# Patient Record
Sex: FEMALE | Race: WHITE | NOT HISPANIC OR LATINO | Employment: FULL TIME | URBAN - METROPOLITAN AREA
[De-identification: names, ages, dates, MRNs, and addresses within clinical notes are randomized per-mention and may not be internally consistent; named-entity substitution may affect disease eponyms.]

---

## 2019-01-31 ENCOUNTER — HOSPITAL ENCOUNTER (OUTPATIENT)
Dept: LAB | Facility: HOSPITAL | Age: 49
Discharge: HOME | End: 2019-01-31

## 2019-01-31 LAB
ALBUMIN SERPL-MCNC: 3.9 G/DL (ref 3.5–5)
ALBUMIN/GLOB SERPL: 1.1 {RATIO} (ref 1.4–2.6)
ALP SERPL-CCNC: 96 U/L (ref 42–98)
ALT SERPL-CCNC: 12 U/L (ref 10–40)
ANION GAP SERPL CALC-SCNC: 16 MMOL/L (ref 8–19)
AST SERPL-CCNC: 12 U/L (ref 15–50)
BASOPHILS # BLD AUTO: 0.06 10*3/UL (ref 0–0.2)
BASOPHILS NFR BLD AUTO: 0.67 % (ref 0–3)
BILIRUB SERPL-MCNC: 0.43 MG/DL (ref 0.2–1.3)
BUN SERPL-MCNC: 13 MG/DL (ref 5–25)
BUN/CREAT SERPL: 16 {RATIO} (ref 6–20)
CALCIUM SERPL-MCNC: 9.4 MG/DL (ref 8.7–10.4)
CHLORIDE SERPL-SCNC: 100 MMOL/L (ref 99–111)
CHOLEST SERPL-MCNC: 192 MG/DL (ref 107–200)
CHOLEST/HDLC SERPL: 3.8 {RATIO} (ref 3–6)
CONV CO2: 26 MMOL/L (ref 22–32)
CONV TOTAL PROTEIN: 7.6 G/DL (ref 6.3–8.2)
CREAT UR-MCNC: 0.79 MG/DL (ref 0.5–0.9)
EOSINOPHIL # BLD AUTO: 0.29 10*3/UL (ref 0–0.7)
EOSINOPHIL # BLD AUTO: 3.43 % (ref 0–7)
ERYTHROCYTE [DISTWIDTH] IN BLOOD BY AUTOMATED COUNT: 16.2 % (ref 11.5–14.5)
GFR SERPLBLD BASED ON 1.73 SQ M-ARVRAT: >60 ML/MIN/{1.73_M2}
GLOBULIN UR ELPH-MCNC: 3.7 G/DL (ref 2–3.5)
GLUCOSE SERPL-MCNC: 355 MG/DL (ref 65–99)
HBA1C MFR BLD: 11.5 G/DL (ref 12–16)
HCT VFR BLD AUTO: 36 % (ref 37–47)
HDLC SERPL-MCNC: 50 MG/DL (ref 40–60)
LDLC SERPL CALC-MCNC: 118 MG/DL (ref 70–100)
LYMPHOCYTES # BLD AUTO: 2.73 10*3/UL (ref 1–5)
MCH RBC QN AUTO: 23.7 PG (ref 27–31)
MCHC RBC AUTO-ENTMCNC: 31.9 G/DL (ref 33–37)
MCV RBC AUTO: 74.3 FL (ref 81–99)
MONOCYTES # BLD AUTO: 0.43 10*3/UL (ref 0.2–1.2)
MONOCYTES NFR BLD AUTO: 5.16 % (ref 3–10)
NEUTROPHILS # BLD AUTO: 4.84 10*3/UL (ref 2–8)
NEUTROPHILS NFR BLD AUTO: 58 % (ref 30–85)
NRBC BLD AUTO-RTO: 0 % (ref 0–0.01)
OSMOLALITY SERPL CALC.SUM OF ELEC: 300 MOSM/KG (ref 273–304)
PLATELET # BLD AUTO: 387 10*3/UL (ref 130–400)
PMV BLD AUTO: 7.5 FL (ref 7.4–10.4)
POTASSIUM SERPL-SCNC: 4.1 MMOL/L (ref 3.5–5.3)
RBC # BLD AUTO: 4.85 10*6/UL (ref 4.2–5.4)
SODIUM SERPL-SCNC: 138 MMOL/L (ref 135–147)
TRIGL SERPL-MCNC: 118 MG/DL (ref 40–150)
TSH SERPL-ACNC: 1.57 M[IU]/L (ref 0.27–4.2)
VARIANT LYMPHS NFR BLD MANUAL: 32.7 % (ref 20–45)
VLDLC SERPL-MCNC: 24 MG/DL (ref 5–37)
WBC # BLD AUTO: 8.35 10*3/UL (ref 4.8–10.8)

## 2019-12-11 ENCOUNTER — HOSPITAL ENCOUNTER (OUTPATIENT)
Dept: OTHER | Facility: HOSPITAL | Age: 49
Discharge: HOME | End: 2019-12-11
Attending: INTERNAL MEDICINE

## 2019-12-11 LAB
AT III ACT/NOR PPP CHRO: 136.4 %{ACTIVITY}
BASOPHILS # BLD AUTO: 0.04 10*3/UL (ref 0–0.2)
BASOPHILS NFR BLD AUTO: 0.5 % (ref 0–3)
CONV ABS IMM GRAN: 0.04 10*3/UL (ref 0–0.2)
CONV IMMATURE GRAN: 0.5 % (ref 0–1.8)
DEPRECATED RDW RBC AUTO: 47 FL (ref 36.4–46.3)
EOSINOPHIL # BLD AUTO: 0.11 10*3/UL (ref 0–0.7)
EOSINOPHIL # BLD AUTO: 1.3 % (ref 0–7)
ERYTHROCYTE [DISTWIDTH] IN BLOOD BY AUTOMATED COUNT: 16.7 % (ref 11.7–14.4)
FERRITIN SERPL-MCNC: 12 NG/ML (ref 10–200)
HCT VFR BLD AUTO: 30.6 % (ref 37–47)
HGB BLD-MCNC: 8.8 G/DL (ref 12–16)
IRON SATN MFR SERPL: 6 % (ref 20–55)
IRON SERPL-MCNC: 29 UG/DL (ref 60–170)
LYMPHOCYTES # BLD AUTO: 1.63 10*3/UL (ref 1–5)
LYMPHOCYTES NFR BLD AUTO: 19.9 % (ref 20–45)
MCH RBC QN AUTO: 22.2 PG (ref 27–31)
MCHC RBC AUTO-ENTMCNC: 28.8 G/DL (ref 33–37)
MCV RBC AUTO: 77.3 FL (ref 81–99)
MONOCYTES # BLD AUTO: 0.45 10*3/UL (ref 0.2–1.2)
MONOCYTES NFR BLD AUTO: 5.5 % (ref 3–10)
NEUTROPHILS # BLD AUTO: 5.91 10*3/UL (ref 2–8)
NEUTROPHILS NFR BLD AUTO: 72.3 % (ref 30–85)
NRBC CBCN: 0 % (ref 0–0.7)
PLATELET # BLD AUTO: 325 10*3/UL (ref 130–400)
PMV BLD AUTO: 9.1 FL (ref 9.4–12.3)
RBC # BLD AUTO: 3.96 10*6/UL (ref 4.2–5.4)
TIBC SERPL-MCNC: 490 UG/DL (ref 245–450)
TRANSFERRIN SERPL-MCNC: 343 MG/DL (ref 250–380)
WBC # BLD AUTO: 8.18 10*3/UL (ref 4.8–10.8)

## 2019-12-13 LAB
PROTEIN C-FUNCTIONAL: 110 % (ref 73–180)
PROTEIN S-FUNCTIONAL: 67 % (ref 63–140)

## 2019-12-14 LAB
C3 SERPL-MCNC: 117 MG/DL (ref 88–201)
C4 SERPL-MCNC: 51 MG/DL (ref 10–40)
CONV ANA IGG BY ELISA: ABNORMAL
RHEUMATOID FACT SERPL-ACNC: <10 IU/ML (ref 0–14)

## 2019-12-17 LAB — F5 GENE MUT ANL BLD/T: NORMAL

## 2019-12-27 ENCOUNTER — HOSPITAL ENCOUNTER (OUTPATIENT)
Dept: OTHER | Facility: HOSPITAL | Age: 49
Discharge: HOME | End: 2019-12-27
Attending: FAMILY MEDICINE

## 2019-12-27 LAB
BASOPHILS # BLD AUTO: 0.06 10*3/UL (ref 0–0.2)
BASOPHILS NFR BLD AUTO: 0.7 % (ref 0–3)
CONV ABS IMM GRAN: 0.01 10*3/UL (ref 0–0.2)
CONV IMMATURE GRAN: 0.1 % (ref 0–1.8)
DEPRECATED RDW RBC AUTO: 54.4 FL (ref 36.4–46.3)
EOSINOPHIL # BLD AUTO: 0.2 10*3/UL (ref 0–0.7)
EOSINOPHIL # BLD AUTO: 2.4 % (ref 0–7)
ERYTHROCYTE [DISTWIDTH] IN BLOOD BY AUTOMATED COUNT: 18.7 % (ref 11.7–14.4)
HCT VFR BLD AUTO: 27.1 % (ref 37–47)
HGB BLD-MCNC: 7.6 G/DL (ref 12–16)
LYMPHOCYTES # BLD AUTO: 2.27 10*3/UL (ref 1–5)
LYMPHOCYTES NFR BLD AUTO: 27.5 % (ref 20–45)
MCH RBC QN AUTO: 22.8 PG (ref 27–31)
MCHC RBC AUTO-ENTMCNC: 28 G/DL (ref 33–37)
MCV RBC AUTO: 81.1 FL (ref 81–99)
MONOCYTES # BLD AUTO: 0.53 10*3/UL (ref 0.2–1.2)
MONOCYTES NFR BLD AUTO: 6.4 % (ref 3–10)
NEUTROPHILS # BLD AUTO: 5.18 10*3/UL (ref 2–8)
NEUTROPHILS NFR BLD AUTO: 62.9 % (ref 30–85)
NRBC CBCN: 0 % (ref 0–0.7)
PLATELET # BLD AUTO: 383 10*3/UL (ref 130–400)
PMV BLD AUTO: 9.3 FL (ref 9.4–12.3)
RBC # BLD AUTO: 3.34 10*6/UL (ref 4.2–5.4)
WBC # BLD AUTO: 8.25 10*3/UL (ref 4.8–10.8)

## 2020-01-09 ENCOUNTER — HOSPITAL ENCOUNTER (OUTPATIENT)
Dept: OTHER | Facility: HOSPITAL | Age: 50
Discharge: HOME | End: 2020-01-09
Attending: OBSTETRICS & GYNECOLOGY

## 2020-01-15 LAB
CONV LAST MENSTURAL PERIOD: NORMAL
SPECIMEN SOURCE: NORMAL
SPECIMEN SOURCE: NORMAL
THIN PREP CVX: NORMAL

## 2020-01-23 ENCOUNTER — HOSPITAL ENCOUNTER (OUTPATIENT)
Dept: LAB | Facility: HOSPITAL | Age: 50
Discharge: HOME | End: 2020-01-23

## 2020-01-23 LAB
ALBUMIN SERPL-MCNC: 4.1 G/DL (ref 3.5–5)
ALBUMIN/GLOB SERPL: 1.3 {RATIO} (ref 1.4–2.6)
ALP SERPL-CCNC: 81 U/L (ref 53–141)
ALT SERPL-CCNC: 11 U/L (ref 10–40)
ANION GAP SERPL CALC-SCNC: 20 MMOL/L (ref 8–19)
AST SERPL-CCNC: 13 U/L (ref 15–50)
BASOPHILS # BLD AUTO: 0.05 10*3/UL (ref 0–0.2)
BASOPHILS NFR BLD AUTO: 0.7 % (ref 0–3)
BILIRUB SERPL-MCNC: 0.31 MG/DL (ref 0.2–1.3)
BUN SERPL-MCNC: 14 MG/DL (ref 5–25)
BUN/CREAT SERPL: 18 {RATIO} (ref 6–20)
BURR CELLS BLD QL SMEAR: SLIGHT
C3 FRG RBC-MCNC: NORMAL
CALCIUM SERPL-MCNC: 9.7 MG/DL (ref 8.7–10.4)
CHLORIDE SERPL-SCNC: 98 MMOL/L (ref 99–111)
CHOLEST SERPL-MCNC: 99 MG/DL (ref 107–200)
CHOLEST/HDLC SERPL: 3.1 {RATIO} (ref 3–6)
CONV ABS IMM GRAN: 0.03 10*3/UL (ref 0–0.2)
CONV ANISOCYTES: SLIGHT
CONV CO2: 22 MMOL/L (ref 22–32)
CONV HYPOCHROMIA IN BLOOD BY LIGHT MICROSCOPY: SLIGHT
CONV IMMATURE GRAN: 0.4 % (ref 0–1.8)
CONV TOTAL PROTEIN: 7.3 G/DL (ref 6.3–8.2)
CREAT UR-MCNC: 0.79 MG/DL (ref 0.5–0.9)
DACRYOCYTES BLD QL SMEAR: SLIGHT
DEPRECATED RDW RBC AUTO: 73 FL (ref 36.4–46.3)
EOSINOPHIL # BLD AUTO: 0.09 10*3/UL (ref 0–0.7)
EOSINOPHIL # BLD AUTO: 1.3 % (ref 0–7)
ERYTHROCYTE [DISTWIDTH] IN BLOOD BY AUTOMATED COUNT: 23.8 % (ref 11.7–14.4)
GFR SERPLBLD BASED ON 1.73 SQ M-ARVRAT: >60 ML/MIN/{1.73_M2}
GLOBULIN UR ELPH-MCNC: 3.2 G/DL (ref 2–3.5)
GLUCOSE SERPL-MCNC: 230 MG/DL (ref 65–99)
HCT VFR BLD AUTO: 35.5 % (ref 37–47)
HDLC SERPL-MCNC: 32 MG/DL (ref 40–60)
HGB BLD-MCNC: 10.8 G/DL (ref 12–16)
LDLC SERPL CALC-MCNC: 47 MG/DL (ref 70–100)
LYMPHOCYTES # BLD AUTO: 1.46 10*3/UL (ref 1–5)
LYMPHOCYTES NFR BLD AUTO: 21.1 % (ref 20–45)
MACROCYTES BLD QL SMEAR: SLIGHT
MCH RBC QN AUTO: 26.3 PG (ref 27–31)
MCHC RBC AUTO-ENTMCNC: 30.4 G/DL (ref 33–37)
MCV RBC AUTO: 86.6 FL (ref 81–99)
MICROCYTES BLD QL: SLIGHT
MONOCYTES # BLD AUTO: 0.46 10*3/UL (ref 0.2–1.2)
MONOCYTES NFR BLD AUTO: 6.6 % (ref 3–10)
NEUTROPHILS # BLD AUTO: 4.83 10*3/UL (ref 2–8)
NEUTROPHILS NFR BLD AUTO: 69.9 % (ref 30–85)
NRBC CBCN: 0 % (ref 0–0.7)
OSMOLALITY SERPL CALC.SUM OF ELEC: 290 MOSM/KG (ref 273–304)
OVALOCYTES BLD QL SMEAR: SLIGHT
PLATELET # BLD AUTO: 367 10*3/UL (ref 130–400)
PMV BLD AUTO: 10.2 FL (ref 9.4–12.3)
POIKILOCYTOSIS BLD QL SMEAR: SLIGHT
POLYCHROMASIA BLD QL SMEAR: NORMAL
POTASSIUM SERPL-SCNC: 4.3 MMOL/L (ref 3.5–5.3)
RBC # BLD AUTO: 4.1 10*6/UL (ref 4.2–5.4)
SODIUM SERPL-SCNC: 136 MMOL/L (ref 135–147)
SPHEROCYTES BLD QL SMEAR: NORMAL
TARGETS BLD QL SMEAR: NORMAL
TRIGL SERPL-MCNC: 99 MG/DL (ref 40–150)
TSH SERPL-ACNC: 0.02 M[IU]/L (ref 0.27–4.2)
VLDLC SERPL-MCNC: 20 MG/DL (ref 5–37)
WBC # BLD AUTO: 6.92 10*3/UL (ref 4.8–10.8)

## 2020-01-24 LAB
EST. AVERAGE GLUCOSE BLD GHB EST-MCNC: 143 MG/DL
HBA1C MFR BLD: 6.6 % (ref 3.5–5.7)

## 2020-02-14 ENCOUNTER — HOSPITAL ENCOUNTER (OUTPATIENT)
Dept: LAB | Facility: HOSPITAL | Age: 50
Discharge: HOME | End: 2020-02-14
Attending: PHYSICIAN ASSISTANT

## 2020-02-14 LAB
CALCIUM SERPL-MCNC: 9.7 MG/DL (ref 8.7–10.4)
PTH-INTACT SERPL-MCNC: 34.1 PG/ML (ref 11.1–79.5)
T4 FREE SERPL-MCNC: 1.5 NG/DL (ref 0.9–1.8)
TSH SERPL-ACNC: 0.09 M[IU]/L (ref 0.27–4.2)

## 2020-02-15 LAB
CONV ANTI MICROSOMAL AB: 10 IU/ML (ref 0–34)
T3FREE SERPL-MCNC: 2.6 PG/ML (ref 2–4.4)

## 2020-02-17 LAB — CONV THYROID STIMULATING IMMUNOGLOBULINS: <0.1 IU/L (ref 0–0.55)

## 2020-03-02 ENCOUNTER — HOSPITAL ENCOUNTER (OUTPATIENT)
Dept: ULTRASOUND IMAGING | Facility: HOSPITAL | Age: 50
Discharge: HOME | End: 2020-03-02
Attending: PHYSICIAN ASSISTANT

## 2020-03-18 ENCOUNTER — HOSPITAL ENCOUNTER (OUTPATIENT)
Dept: NUCLEAR MEDICINE | Facility: HOSPITAL | Age: 50
Discharge: HOME | End: 2020-03-18
Attending: PHYSICIAN ASSISTANT

## 2020-04-20 ENCOUNTER — HOSPITAL ENCOUNTER (OUTPATIENT)
Dept: LAB | Facility: HOSPITAL | Age: 50
Discharge: HOME | End: 2020-04-20
Attending: PHYSICIAN ASSISTANT

## 2020-04-20 LAB
CALCIUM SERPL-MCNC: 9.6 MG/DL (ref 8.7–10.4)
PTH-INTACT SERPL-MCNC: 8.6 PG/ML (ref 11.1–79.5)

## 2020-05-20 ENCOUNTER — HOSPITAL ENCOUNTER (OUTPATIENT)
Dept: LAB | Facility: HOSPITAL | Age: 50
Discharge: HOME | End: 2020-05-20
Attending: OTOLARYNGOLOGY

## 2020-05-20 LAB
CALCIUM SERPL-MCNC: 9.4 MG/DL (ref 8.7–10.4)
PTH-INTACT SERPL-MCNC: 38.3 PG/ML (ref 11.1–79.5)
T4 FREE SERPL-MCNC: 1.3 NG/DL (ref 0.9–1.8)
TSH SERPL-ACNC: 22.45 M[IU]/L (ref 0.27–4.2)

## 2020-06-29 ENCOUNTER — HOSPITAL ENCOUNTER (OUTPATIENT)
Dept: LAB | Facility: HOSPITAL | Age: 50
Discharge: HOME | End: 2020-06-29
Attending: PHYSICIAN ASSISTANT

## 2020-06-29 LAB
T4 FREE SERPL-MCNC: 2 NG/DL (ref 0.9–1.8)
TSH SERPL-ACNC: 0.27 M[IU]/L (ref 0.27–4.2)

## 2020-08-20 ENCOUNTER — HOSPITAL ENCOUNTER (OUTPATIENT)
Dept: LAB | Facility: HOSPITAL | Age: 50
Discharge: HOME | End: 2020-08-20
Attending: PHYSICIAN ASSISTANT

## 2020-08-20 LAB
T4 FREE SERPL-MCNC: 2 NG/DL (ref 0.9–1.8)
TSH SERPL-ACNC: 2.63 M[IU]/L (ref 0.27–4.2)

## 2020-09-24 ENCOUNTER — HOSPITAL ENCOUNTER (OUTPATIENT)
Dept: URGENT CARE | Facility: CLINIC | Age: 50
Discharge: HOME | End: 2020-09-24
Attending: PHYSICIAN ASSISTANT

## 2020-10-30 ENCOUNTER — HOSPITAL ENCOUNTER (OUTPATIENT)
Dept: LAB | Facility: HOSPITAL | Age: 50
Discharge: HOME | End: 2020-10-30
Attending: INTERNAL MEDICINE

## 2020-10-30 LAB
BASOPHILS # BLD AUTO: 0.07 10*3/UL (ref 0–0.2)
BASOPHILS NFR BLD AUTO: 0.9 % (ref 0–3)
CONV ABS IMM GRAN: 0.03 10*3/UL (ref 0–0.2)
CONV IMMATURE GRAN: 0.4 % (ref 0–1.8)
DEPRECATED RDW RBC AUTO: 54.6 FL (ref 36.4–46.3)
EOSINOPHIL # BLD AUTO: 0.27 10*3/UL (ref 0–0.7)
EOSINOPHIL # BLD AUTO: 3.6 % (ref 0–7)
ERYTHROCYTE [DISTWIDTH] IN BLOOD BY AUTOMATED COUNT: 15.7 % (ref 11.7–14.4)
FERRITIN SERPL-MCNC: 40 NG/ML (ref 10–200)
HCT VFR BLD AUTO: 33.6 % (ref 37–47)
HGB BLD-MCNC: 10.6 G/DL (ref 12–16)
IRON SATN MFR SERPL: 15 % (ref 20–55)
IRON SERPL-MCNC: 61 UG/DL (ref 60–170)
LYMPHOCYTES # BLD AUTO: 1.68 10*3/UL (ref 1–5)
LYMPHOCYTES NFR BLD AUTO: 22.5 % (ref 20–45)
MCH RBC QN AUTO: 30 PG (ref 27–31)
MCHC RBC AUTO-ENTMCNC: 31.5 G/DL (ref 33–37)
MCV RBC AUTO: 95.2 FL (ref 81–99)
MONOCYTES # BLD AUTO: 0.41 10*3/UL (ref 0.2–1.2)
MONOCYTES NFR BLD AUTO: 5.5 % (ref 3–10)
NEUTROPHILS # BLD AUTO: 5.01 10*3/UL (ref 2–8)
NEUTROPHILS NFR BLD AUTO: 67.1 % (ref 30–85)
NRBC CBCN: 0 % (ref 0–0.7)
PLATELET # BLD AUTO: 297 10*3/UL (ref 130–400)
PMV BLD AUTO: 10.7 FL (ref 9.4–12.3)
RBC # BLD AUTO: 3.53 10*6/UL (ref 4.2–5.4)
TIBC SERPL-MCNC: 405 UG/DL (ref 245–450)
TRANSFERRIN SERPL-MCNC: 283 MG/DL (ref 250–380)
WBC # BLD AUTO: 7.47 10*3/UL (ref 4.8–10.8)

## 2020-11-30 ENCOUNTER — HOSPITAL ENCOUNTER (OUTPATIENT)
Dept: LAB | Facility: HOSPITAL | Age: 50
Discharge: HOME | End: 2020-11-30
Attending: PHYSICIAN ASSISTANT

## 2020-11-30 LAB
HCT VFR BLD AUTO: 34.9 % (ref 37–47)
HGB BLD-MCNC: 10.9 G/DL (ref 12–16)
T4 FREE SERPL-MCNC: 1.5 NG/DL (ref 0.9–1.8)
TSH SERPL-ACNC: 3.91 M[IU]/L (ref 0.27–4.2)

## 2020-12-01 LAB — T3FREE SERPL-MCNC: 2.2 PG/ML (ref 2–4.4)

## 2020-12-08 ENCOUNTER — HOSPITAL ENCOUNTER (OUTPATIENT)
Dept: LAB | Facility: HOSPITAL | Age: 50
Discharge: HOME | End: 2020-12-08
Attending: PHYSICIAN ASSISTANT

## 2020-12-08 LAB
HCT VFR BLD AUTO: 36.4 % (ref 37–47)
HGB BLD-MCNC: 11.5 G/DL (ref 12–16)

## 2020-12-14 ENCOUNTER — HOSPITAL ENCOUNTER (OUTPATIENT)
Dept: LAB | Facility: HOSPITAL | Age: 50
Discharge: HOME | End: 2020-12-14
Attending: PHYSICIAN ASSISTANT

## 2020-12-14 LAB
HCT VFR BLD AUTO: 39.7 % (ref 37–47)
HGB BLD-MCNC: 12.3 G/DL (ref 12–16)

## 2020-12-17 ENCOUNTER — HOSPITAL ENCOUNTER (OUTPATIENT)
Dept: PREADMISSION TESTING | Facility: HOSPITAL | Age: 50
Discharge: HOME | End: 2020-12-17
Attending: OBSTETRICS & GYNECOLOGY

## 2020-12-18 ENCOUNTER — HOSPITAL ENCOUNTER (OUTPATIENT)
Dept: PREADMISSION TESTING | Facility: HOSPITAL | Age: 50
Discharge: HOME | End: 2020-12-18
Attending: OBSTETRICS & GYNECOLOGY

## 2020-12-18 LAB
HCG UR QL: NEGATIVE
SARS-COV-2 RNA SPEC QL NAA+PROBE: NOT DETECTED

## 2020-12-22 ENCOUNTER — HOSPITAL ENCOUNTER (OUTPATIENT)
Dept: PERIOP | Facility: HOSPITAL | Age: 50
Setting detail: HOSPITAL OUTPATIENT SURGERY
Discharge: HOME | End: 2020-12-23
Attending: OBSTETRICS & GYNECOLOGY

## 2020-12-22 LAB
GLUCOSE BLD-MCNC: 191 MG/DL (ref 65–99)
GLUCOSE BLD-MCNC: 244 MG/DL (ref 65–99)
GLUCOSE BLD-MCNC: 266 MG/DL (ref 65–99)
GLUCOSE BLD-MCNC: 266 MG/DL (ref 65–99)
GLUCOSE BLD-MCNC: 275 MG/DL (ref 65–99)

## 2020-12-23 LAB — GLUCOSE BLD-MCNC: 172 MG/DL (ref 65–99)

## 2021-02-04 ENCOUNTER — HOSPITAL ENCOUNTER (OUTPATIENT)
Dept: VACCINE CLINIC | Facility: HOSPITAL | Age: 51
Discharge: HOME | End: 2021-02-04
Attending: INTERNAL MEDICINE

## 2021-02-24 ENCOUNTER — HOSPITAL ENCOUNTER (OUTPATIENT)
Dept: LAB | Facility: HOSPITAL | Age: 51
Discharge: HOME | End: 2021-02-24

## 2021-02-24 LAB
ALBUMIN SERPL-MCNC: 4.1 G/DL (ref 3.5–5)
ALBUMIN/GLOB SERPL: 1.4 {RATIO} (ref 1.4–2.6)
ALP SERPL-CCNC: 106 U/L (ref 53–141)
ALT SERPL-CCNC: 19 U/L (ref 10–40)
ANION GAP SERPL CALC-SCNC: 12 MMOL/L (ref 8–19)
AST SERPL-CCNC: 21 U/L (ref 15–50)
BASOPHILS # BLD AUTO: 0.05 10*3/UL (ref 0–0.2)
BASOPHILS NFR BLD AUTO: 0.7 % (ref 0–3)
BILIRUB SERPL-MCNC: 0.44 MG/DL (ref 0.2–1.3)
BUN SERPL-MCNC: 18 MG/DL (ref 5–25)
BUN/CREAT SERPL: 19 {RATIO} (ref 6–20)
CALCIUM SERPL-MCNC: 8.8 MG/DL (ref 8.7–10.4)
CHLORIDE SERPL-SCNC: 100 MMOL/L (ref 99–111)
CHOLEST SERPL-MCNC: 213 MG/DL (ref 107–200)
CHOLEST/HDLC SERPL: 2.9 {RATIO} (ref 3–6)
CONV ABS IMM GRAN: 0.03 10*3/UL (ref 0–0.2)
CONV CO2: 26 MMOL/L (ref 22–32)
CONV IMMATURE GRAN: 0.4 % (ref 0–1.8)
CONV TOTAL PROTEIN: 7.1 G/DL (ref 6.3–8.2)
CREAT UR-MCNC: 0.95 MG/DL (ref 0.5–0.9)
DEPRECATED RDW RBC AUTO: 50.8 FL (ref 36.4–46.3)
EOSINOPHIL # BLD AUTO: 0.12 10*3/UL (ref 0–0.7)
EOSINOPHIL # BLD AUTO: 1.7 % (ref 0–7)
ERYTHROCYTE [DISTWIDTH] IN BLOOD BY AUTOMATED COUNT: 14.3 % (ref 11.7–14.4)
EST. AVERAGE GLUCOSE BLD GHB EST-MCNC: 232 MG/DL
GFR SERPLBLD BASED ON 1.73 SQ M-ARVRAT: >60 ML/MIN/{1.73_M2}
GLOBULIN UR ELPH-MCNC: 3 G/DL (ref 2–3.5)
GLUCOSE SERPL-MCNC: 246 MG/DL (ref 65–99)
HBA1C MFR BLD: 9.7 % (ref 3.5–5.7)
HCT VFR BLD AUTO: 43.5 % (ref 37–47)
HDLC SERPL-MCNC: 73 MG/DL (ref 40–60)
HGB BLD-MCNC: 14.2 G/DL (ref 12–16)
LDLC SERPL CALC-MCNC: 116 MG/DL (ref 70–100)
LYMPHOCYTES # BLD AUTO: 1.96 10*3/UL (ref 1–5)
LYMPHOCYTES NFR BLD AUTO: 27.1 % (ref 20–45)
MCH RBC QN AUTO: 31.6 PG (ref 27–31)
MCHC RBC AUTO-ENTMCNC: 32.6 G/DL (ref 33–37)
MCV RBC AUTO: 96.7 FL (ref 81–99)
MONOCYTES # BLD AUTO: 0.58 10*3/UL (ref 0.2–1.2)
MONOCYTES NFR BLD AUTO: 8 % (ref 3–10)
NEUTROPHILS # BLD AUTO: 4.5 10*3/UL (ref 2–8)
NEUTROPHILS NFR BLD AUTO: 62.1 % (ref 30–85)
NRBC CBCN: 0 % (ref 0–0.7)
OSMOLALITY SERPL CALC.SUM OF ELEC: 288 MOSM/KG (ref 273–304)
PLATELET # BLD AUTO: 232 10*3/UL (ref 130–400)
PMV BLD AUTO: 9.7 FL (ref 9.4–12.3)
POTASSIUM SERPL-SCNC: 4.3 MMOL/L (ref 3.5–5.3)
RBC # BLD AUTO: 4.5 10*6/UL (ref 4.2–5.4)
SODIUM SERPL-SCNC: 134 MMOL/L (ref 135–147)
TRIGL SERPL-MCNC: 119 MG/DL (ref 40–150)
TSH SERPL-ACNC: 33.69 M[IU]/L (ref 0.27–4.2)
VLDLC SERPL-MCNC: 24 MG/DL (ref 5–37)
WBC # BLD AUTO: 7.24 10*3/UL (ref 4.8–10.8)

## 2021-03-05 ENCOUNTER — HOSPITAL ENCOUNTER (OUTPATIENT)
Dept: VACCINE CLINIC | Facility: HOSPITAL | Age: 51
Discharge: HOME | End: 2021-03-05
Attending: INTERNAL MEDICINE

## 2021-05-07 ENCOUNTER — HOSPITAL ENCOUNTER (OUTPATIENT)
Dept: DIABETES SERVICES | Facility: HOSPITAL | Age: 51
Discharge: HOME | End: 2021-05-07
Attending: NURSE PRACTITIONER

## 2021-05-07 ENCOUNTER — OFFICE VISIT CONVERTED (OUTPATIENT)
Dept: DIABETES SERVICES | Facility: HOSPITAL | Age: 51
End: 2021-05-07
Attending: NURSE PRACTITIONER

## 2021-05-07 LAB — GLUCOSE BLD-MCNC: 244 MG/DL (ref 65–99)

## 2021-05-28 VITALS
WEIGHT: 237.22 LBS | DIASTOLIC BLOOD PRESSURE: 82 MMHG | SYSTOLIC BLOOD PRESSURE: 154 MMHG | HEART RATE: 98 BPM | OXYGEN SATURATION: 99 % | RESPIRATION RATE: 18 BRPM | TEMPERATURE: 97.7 F | BODY MASS INDEX: 42.03 KG/M2 | HEIGHT: 63 IN

## 2021-05-28 NOTE — PROGRESS NOTES
Patient: WANG HOUSER     Acct: [id number]     Report: #[id number]  UNIT #: [id number]     : 1970    Encounter Date:2021  PRIMARY CARE: Guy Baetty  ***Indu***  --------------------------------------------------------------------------------------------------------------------  Date of Encounter      May 7, 2021            Chief Complaint      DIABETES MELLITUS TYPE II            Allergies      Coded Allergies:             NO KNOWN ALLERGIES (Unverified , 20)            Medications      Last Reconciled on 21 6:58 pm by ROBIN SNYDER      Gabapentin (Gabapentin) 600 Mg Tablet      600 MG PO BID, #60 TAB 0 Refills         Reported         21       rOPINIRole HCl (rOPINIRole HCl) 0.25 Mg Tablet      0.25 MG PO HS for 30 Days, #30 TAB         Reported         20       Apixaban (Eliquis) 5 Mg Tablet      5 MG PO BID         Reported         20       Atorvastatin (Atorvastatin) 10 Mg Tablet      10 MG PO HS         Reported         20       Levothyroxine (Levothyroxine) 0.15 Mg Tablet      0.15 MG PO HS         Reported         20       Insulin Glargine (Lantus SOLOSTAR) 100 Unit/1 Ml Insuln.pen      18 UNITS SUBQ HS, #1 BOX 0 Refills         Reported         19       Insulin Lispro (HumaLOG KWIKPEN 100 UNITS/ML) 100 Units/Ml Insuln.pen      5 UNITS SUBQ TIDPC, #1 BOX 0 Refills         Reported         19            Vital Signs      Height 5 ft 3 in / 160.02 cm      Weight 237 lbs 3.439 oz / 107.6 kg      BSA 2.08 m2      BMI 42.0 kg/m2      Temperature 97.7 F / 36.5 C - Temporal      Pulse 98      Respirations 18      Blood Pressure 154/82 Sitting, Left Arm      Pulse Oximetry 99%, room air      Blood Glucose Value:  244 (Finger Stick)            Eye Exam      When was your last eye exam?:              Where was it done?:        [facility]            Pain Score      Experiencing any pain?:  Yes      Pain Scale Used:  Numerical       Pain Intensity:  7      Pain Comment:        DVT in 2019            Preventative      Hx Influenza Vaccination:  Yes      Date Influenza Vaccine Given:  Oct 1, 2020      Influenza Vaccine Declined:  No      Have You Had 2 or More Falls i:  No      Have You Had a Fall with an In:  No      Hx Pneumococcal Vaccination:  No      Encouraged to follow-up with:  PCP regarding preventative exams.      Chart initiated by:      Yumiko Pugh MA            HPI - Diabetes      This patient is seen in the office today for evaluation for diabetes medication     management and possible insulin pump.  She is a 51-year-old female patient with     a 15-year history of type 2 diabetes.  Her diabetes is complicated by some mild     symptoms of diabetic neuropathy and questionable diabetic retinopathy.  Patient     indicates she was initially treated with metformin alone but had a lot of     gastrointestinal issues so it was stopped approximately 3 months ago.  She was     started on insulin in 2019 and is currently taking 18 units of Lantus each day     as well as Humalog 5 units after meals.  She states the Lantus was increased     from 15 units to 18 units approximately 3 months ago.  She denies ever being     treated with any other medications in the past.            She states she has been trying to watch her diet and struggles to lose weight as    well as control her glucose levels despite her efforts.  She has never had     nutrition counseling specifically for her diabetes.  Unfortunately, the patient     has not been monitoring her glucose levels with any routine pattern.            Most Recent Lab Findings      Most Recent HGA1C      A point-of-care A1c was collected in the office today and was 9.4%.  This     indicates uncontrolled type 2 diabetes.  In comparison her A1c in February this     year was 9.7%.      Diabetes Type:  Type 2      Diabetes Complications:  Neuropathy (some pains at night in the feet),     Retinopathy  "(no retinopathy but does have \"blood behind the eyes\"; going for     injections and laser treatment)      Number of Years?:  15      Hospitalizations 2nd to DM?:  No      ER/911 Secondary to DM:  No      Dietary Habits:  3 Meals daily (sometimes only eats meals; does not count carbs     \"don't know where to even begin with that\")      Exercise:  None      Frequency:  Times per day (maybe once a week; just got a new meter)            PAST,FAMILY,   Past Medical History      Past Medical History:  Thyroid Disease (removed due to thyroid nodules)      Other Medical History      Anemia; DVT right popliteal; bilateral PEs - 2019            Past Surgical History      Past Surgical History:  Cholecystectomy, Hysterectomy (partial), Thyroid Surgery            Past Family History      TYPE 2 DM (Mom and Grandparents; father), Stroke (Dad)            Social History      Marrital Status:        Lives independently:  No      Number of Children:  2      Occupation:  [employer]            Tobacco Use      Smoking status:  Never smoker            Vaping      Currently Vaping:  No            Alcohol Use      Occassional            Substance Use      Substance Use:  Denies Use            Review of Sytems      General:  Appetite Changes (no appetite at all over the last six months or so);     No Fatigue, No Weight Loss; Weight Gain (40lbs. over the last year); No Weakness      Eyes:  No Blurred Vision, No Corrective Lenses; Vision Changes; No Vision Loss      Cardiovascular:  No Chest Pain, No Palpitations, No Peripheral edema      Gastrointestinal:  No Constipation, No Diarrhea, No Nausea/Vomiting      Integumentary:  No Lesions, No Rash, No Wounds      Neurologic:  No Extremity Pain, No Numbness; Tingling (Right Leg from prior     DVT); No Headache, No Dizziness      Psychiatric:  No Anxiety, No Depression, No Stress      Endocrine:  No Hypoglycemia, No Hypo Unawareness, No Nocturnal Hypo, No     Polyuria, No Polyphagia, " No Polydipsia      ENT:  No Hearing loss, No Sinusitis, No Difficulty swallowing      Respiratory:  No Shortness of breath, No Wheezing; Cough      Genitourinary:  No UTI, No Vaginal yeast infections, No Difficulty urinating, No    Incontinence      Musculoskeletal:  No Joint pain, No Muscle pain; Back pain            Exam      Constitutional:  Awake and Alert, Appropriately dressed/groomed; Not Acutely     Distressed      Eyes:  No Scleral Icterus; Intact EOM; No Eyelid swelling      Cardiovascular:  Peripheral Edema (right leg due to past DVT), Normal Chest     Appearance      Musculoskeletal:  Steady Gait, Normal Strength, Normal ROM,       Respiratory:  No Respiratory Distress, No Cyanosis, No Accessory Muscle use      Skin:  No Blisters, No Cuts\Scrapes, No Acanthosis Nigricans, No DM Dermopathy,     No Fungus, No NLD, No Rash, No Vitiligo      Psychiatric:  Appropriate Affect, Intact Judgement, Oriented x 3,       ENMT:  Nose/ears normal, Normal hearing      Extremities:  No Cyanosis, No Edema; Normal temperature; No Deformity            Impression      Type 2 diabetes uncontrolled with mild diabetic neuropathy and questionable     diabetic retinopathy, anemia, chronic DVT of the right popliteal vein, history     of bilateral pulmonary emboli, hypothyroid, obesity class III            Diagnosis      Type 2 diabetes mellitus with hyperglycemia - E11.65            Notes      New Office Procedures      * POINT OF CARE BG, Routine         Dx: Type 2 diabetes mellitus with hyperglycemia - E11.65      * POINT OF CARE HGA1C, Routine         Dx: Type 2 diabetes mellitus with hyperglycemia - E11.65      New Referrals      * DM Self Management Nutrition, Routine         KEO PAREDES with Diabetes Self Managment Ed         Dx: Type 2 diabetes mellitus with hyperglycemia - E11.65            Plan      At this time the patient was instructed to begin monitoring her glucose levels     2-3 times each day and record them  for review at her follow-up appointment.  We     will schedule the patient to see the dietitian for nutrition counseling for     carbohydrate counting, portion control and weight loss.  The patient will be     scheduled for follow-up appointment in this office in 1 month where we will     begin to modify her insulin doses to include carbohydrate coverage and glucose     correction.  In regards to the patient's interest in an insulin pump, we will     delay that request as the patient needs to become accustomed to carbohydrate     counting and adjusting insulin accordingly before going on an insulin pump.            Pain Plan      Pain Zero Today            Referrals      Dietician            Electronically signed by ROBIN SNYDER  05/09/2021 18:58       Disclaimer: Converted document may not contain table formatting or lab diagrams. Please see [employer] [facility] for the authenticated document.

## 2021-06-04 ENCOUNTER — OFFICE VISIT CONVERTED (OUTPATIENT)
Dept: DIABETES SERVICES | Facility: HOSPITAL | Age: 51
End: 2021-06-04
Attending: NURSE PRACTITIONER

## 2021-06-04 ENCOUNTER — HOSPITAL ENCOUNTER (OUTPATIENT)
Dept: DIABETES SERVICES | Facility: HOSPITAL | Age: 51
Discharge: HOME | End: 2021-06-04
Attending: NURSE PRACTITIONER

## 2021-06-04 LAB — GLUCOSE BLD-MCNC: 121 MG/DL (ref 65–99)

## 2021-06-06 VITALS
TEMPERATURE: 97.9 F | BODY MASS INDEX: 41.72 KG/M2 | SYSTOLIC BLOOD PRESSURE: 138 MMHG | DIASTOLIC BLOOD PRESSURE: 72 MMHG | HEART RATE: 84 BPM | HEIGHT: 63 IN | WEIGHT: 235.45 LBS | RESPIRATION RATE: 18 BRPM

## 2021-06-06 NOTE — PROGRESS NOTES
Patient: WANG HOUSER     Acct: [id number]     Report: #[id number]  UNIT #: [id number]     : 1970    Encounter Date:2021  PRIMARY CARE: Guy Beatty  ***Indu***  --------------------------------------------------------------------------------------------------------------------  Date of Encounter      2021            Chief Complaint      DIABETES MELLITUS TYPE II            Allergies      Coded Allergies:             NO KNOWN ALLERGIES (Unverified , 20)            Medications      Last Reconciled on 21 11:55 by ROBIN SNYDER      Lisinopril* (Lisinopril*) 10 Mg Tablet      10 MG PO QDAY, #30 TAB 0 Refills         Reported         21       Insulin Degludec (Tresiba Flextouch U-100) 100 Unit/1 Ml Insuln.pen      20 UNITS SUBQ QDAY for 30 Days, #1 BOX 3 Refills         Prov: ROBIN SNYDER         21       Gabapentin (Gabapentin) 600 Mg Tablet      600 MG PO BID for 30 Days, #60 TAB 1 Refill         Prov: ROBIN SNYDER         21       rOPINIRole HCl (rOPINIRole HCl) 0.25 Mg Tablet      0.25 MG PO HS for 30 Days, #30 TAB         Reported         20       Apixaban (Eliquis) 5 Mg Tablet      5 MG PO BID         Reported         20       Atorvastatin (Atorvastatin) 10 Mg Tablet      10 MG PO HS         Reported         20       Levothyroxine (Levothyroxine) 0.15 Mg Tablet      0.15 MG PO HS         Reported         20       Insulin Lispro (HumaLOG KWIKPEN 100 UNITS/ML) 100 Units/Ml Insuln.pen      5 UNITS SUBQ TIDPC, #1 BOX 0 Refills         Reported         19            Vital Signs      Height 5 ft 3.00 in / 160.02 cm      Weight 235 lbs 7.220 oz / 106.8 kg      BSA 2.07 m2      BMI 41.7 kg/m2      Temperature 97.9 F / 36.61 C - Temporal      Pulse 84      Respirations 18      Blood Pressure 138/72 Sitting, Left Arm      Blood Glucose Value:  121 (Finger Stick)            Eye Exam      When was your last eye exam?:             "  Where was it done?:        [facility]            Pain Score      Experiencing any pain?:  No            Preventative      Hx Influenza Vaccination:  Yes      Date Influenza Vaccine Given:  Oct 1, 2020      Influenza Vaccine Declined:  No      Have You Had 2 or More Falls i:  No      Have You Had a Fall with an In:  No      Hx Pneumococcal Vaccination:  No      Encouraged to follow-up with:  PCP regarding preventative exams.      Chart initiated by:      Yumiko Pugh MA            HPI - Diabetes      This patient is seen in the office today for follow-up evaluation for diabetes     medication management.  She is a 51-year-old female patient with a history of     type 2 diabetes with diabetic neuropathy and diabetic retinopathy.  She is     currently managed using Tresiba insulin 20 units every day and Humalog 5 units 3    times a day with meals which was added at her last appointment.  She was     previously using Lantus insulin but insurance will no longer cover and she was     transitioned to the Tresiba insulin.  She brings glucose logs to the appointment    today that show that most glucose levels are staying in in the 150 or higher     range with several glucose levels in the 200s.  She had one episode of glucose     of 99 and states that she felt really strange and a bit shaky at the time.  It     was explained to the patient this is a relative hypoglycemia and we will bring     her glucose levels down slowly so that she tolerates lower levels better over     time.            Most Recent Lab Findings      Most Recent HGA1C      Most recent A1c was collected on 5/7/2021 was 9.4% indicating uncontrolled type     2 diabetes.      Diabetes Type:  Type 2      Diabetes Complications:  Neuropathy (some pains at night in the feet),     Retinopathy (no retinopathy but does have \"blood behind the eyes\"; going for     injections and laser treatment; she states her vision has improved some and she     will be " "undergoing laser treatment next Thursday)      Number of Years?:  15      Hospitalizations 2nd to DM?:  No      ER/911 Secondary to DM:  No      Dietary Habits:  3 Meals daily (sometimes only eats meals; does not count carbs     \"don't know where to even begin with that\")      Exercise:  None      Frequency:  Times per day (2-3 times each day)      Blood Glucose Range:         with most glucose levels in the 160-250 range            PAST,FAMILY,   Past Medical History      Past Medical History:  Thyroid Disease (removed due to thyroid nodules)      Other Medical History      Anemia; DVT right popliteal; bilateral PEs - 2019            Past Surgical History      Past Surgical History:  Cholecystectomy, Hysterectomy (partial), Thyroid Surgery            Past Family History      TYPE 2 DM (Mom and Grandparents; father), Stroke (Dad)            Social History      Marrital Status:        Lives independently:  No      Number of Children:  2      Occupation:  [employer]            Tobacco Use      Smoking status:  Never smoker            Vaping      Currently Vaping:  No            Alcohol Use      Occassional            Substance Use      Substance Use:  Denies Use            Review of Sytems      General:  No Appetite Changes, No Fatigue, No Weight Loss, No Weight Gain, No     Weakness      Eyes:  No Blurred Vision, No Corrective Lenses, No Vision Changes, No Vision     Loss      Cardiovascular:  No Chest Pain, No Palpitations, No Peripheral edema      Gastrointestinal:  No Constipation, No Diarrhea, No Nausea/Vomiting      Integumentary:  No Lesions, No Rash, No Wounds      Neurologic:  No Extremity Pain, No Numbness, No Tingling, No Headache, No     Dizziness      Psychiatric:  No Anxiety, No Depression; Stress      Endocrine:  No Hypoglycemia, No Hypo Unawareness, No Nocturnal Hypo, No Edwin    yuria, No Polyphagia, No Polydipsia      ENT:  No Hearing loss, No Sinusitis, No Difficulty swallowing    "   Respiratory:  No Shortness of breath, No Wheezing; Cough      Genitourinary:  No UTI, No Vaginal yeast infections, No Difficulty urinating, No    Incontinence      Musculoskeletal:  No Joint pain, No Muscle pain, No Back pain            Exam      Constitutional:  Awake and Alert, Appropriately dressed/groomed; Not Acutely Di    stressed      Eyes:  No Scleral Icterus; Intact EOM; No Eyelid swelling      Cardiovascular:  No Peripheral Edema; Normal Chest Appearance      Musculoskeletal:  Steady Gait, Normal Strength, Normal ROM,       Respiratory:  No Respiratory Distress, No Cyanosis, No Accessory Muscle use      Skin:  No Blisters, No Cuts\Scrapes, No Acanthosis Nigricans, No DM Dermopathy,     No Fungus, No NLD, No Rash, No Vitiligo      Psychiatric:  Appropriate Affect, Intact Judgement, Oriented x 3,       ENMT:  Nose/ears normal, Normal hearing      Extremities:  No Cyanosis, No Edema; Normal temperature; No Deformity            Impression      Type 2 diabetes uncontrolled with diabetic neuropathy and diabetic retinopathy,     anemia, hypothyroid, history of right popliteal DVT and bilateral pulmonary     emboli, obesity class III with BMI of 41.7            Diagnosis      Type 2 diabetes mellitus with hyperglycemia - E11.65            Notes      New Office Procedures      * POINT OF CARE BG, Routine         Dx: Type 2 diabetes mellitus with hyperglycemia - E11.65            Plan      The patient was instructed to increase her Tresiba to 25 units every day.  She     will monitor her fasting glucose levels for 1 week and if they were remain above    140 mg/dL she will increase by 3 units and then repeat the cycle every week     until she is able to achieve fasting glucose levels around 130-140 without     hypoglycemia.  At the patient's last appointment she had been scheduled to see     the dietitian for nutrition counseling but she missed the appointment and has     been rescheduled.  She will be scheduled  for follow-up appointment in this     office in 1 month.  At that time we will begin making adjustments to her     mealtime insulin based on carbohydrate intake.  In the meantime she is     encouraged to monitor her glucose levels 2-3 times each day and record them for     additional review at her follow-up appointment.            Pain Plan      Pain Zero Today            Topics Patient Counseled on      Topics Patient Counseled on:  Meds, Monitoring            Electronically signed by ROBIN SNYDER  06/04/2021 11:55       Disclaimer: Converted document may not contain table formatting or lab diagrams. Please see [employer] [facility] for the authenticated document.

## 2021-06-10 RX ORDER — LISINOPRIL 10 MG/1
10 TABLET ORAL DAILY
Qty: 30 TABLET | Refills: 11 | Status: SHIPPED | OUTPATIENT
Start: 2021-06-10 | End: 2021-07-12 | Stop reason: SDUPTHER

## 2021-06-11 ENCOUNTER — EDUCATION (OUTPATIENT)
Dept: DIABETES SERVICES | Facility: HOSPITAL | Age: 51
End: 2021-06-11

## 2021-06-11 VITALS — HEIGHT: 63 IN | BODY MASS INDEX: 42.29 KG/M2 | WEIGHT: 238.7 LBS

## 2021-06-11 DIAGNOSIS — IMO0002 DIABETES MELLITUS TYPE 2, UNCONTROLLED, WITH COMPLICATIONS: Primary | ICD-10-CM

## 2021-06-11 PROCEDURE — 97802 MEDICAL NUTRITION INDIV IN: CPT | Performed by: DIETITIAN, REGISTERED

## 2021-06-11 NOTE — PROGRESS NOTES
"Carmelina Saldana presents for nutrition consult r/t T2DM.     No past medical history on file.    Social History     Tobacco Use   • Smoking status: Not on file   Substance Use Topics   • Alcohol use: Not on file       Current Outpatient Medications on File Prior to Visit   Medication Sig   • lisinopril (PRINIVIL,ZESTRIL) 10 MG tablet Take 1 tablet by mouth Daily.     No current facility-administered medications on file prior to visit.     Per pt, she reports also taking Tresiba (25 units/day) and Humalog (5 units w/ meals).        06/11/21  0801   Weight: 108 kg (238 lb 11.2 oz)      160 cm (63\")    Body mass index is 42.28 kg/m².       Lab Results   Component Value Date    HGBA1C 9.7 (H) 02/24/2021       Lab Results   Component Value Date    CHLPL 213 (H) 02/24/2021    TRIG 119 02/24/2021    HDL 73 (H) 02/24/2021     (H) 02/24/2021       Potassium   Date Value Ref Range Status   02/24/2021 4.3 3.5 - 5.3 mmol/L Final   02/01/2020 4.1 3.5 - 5.1 mmol/L Final     Creatinine   Date Value Ref Range Status   02/24/2021 0.95 (H) 0.50 - 0.90 mg/dL Final   02/01/2020 0.7 0.7 - 1.5 mg/dL Final     ALT (SGPT)   Date Value Ref Range Status   02/24/2021 19 10 - 40 U/L Final   02/01/2020 17 0 - 35 U/L Final     Comment:     If ALT testing ordered prior to 2359 on 11/16/19, please use reference range: Male 0-50, Female 0-35.  [facility] switched to a new ALT assay on 11/16/19 which yields results 10 U/L lower than the old assay.     AST (SGOT)   Date Value Ref Range Status   02/24/2021 21 15 - 50 U/L Final   02/01/2020 20 15 - 46 U/L Final        Diet History/ Habits: Pt reports consuming 2 meals/day.  Breakfast- rare.  Lunch- chicken salad.  Dinner- grilled chicken, peas, Carmichael sprouts.  Snacks include greek yogurt w/ blueberries, cottage cheese w/ peaches.  Beverages include sugar-free root beer or Dr. Pepper, water.    Physical Activity: Pt states activity level is low outside of " work    Nutrition counseling provided on carbohydrate counting, portion control, measuring and reading labels. Discussed eating out and gave suggestions on controlling carbohydrate intake and making healthier food choices.     Meal Plan:   Total Carbohydrates per meal: 2-3 carb servings/meal, at least 3 meals/day  Lean protein with meals.  Limit added fats.  Snacks: 1 carbohydrate serving (</= 22 g) + 1 protein serving.     Daily exercise encouraged (as recommended by healthcare provider). Discussed the befits of exercise in lowering blood glucose, blood pressure, cholesterol, stress and controlling body weight.     Advised to continue daily blood glucose monitoring to assist with understanding of factors affecting blood glucose and assist with management of diabetes.  Pt reports checking BG frequently during the day.  Fasting 110s, before lunch and dinner 150s, 1 1/2 hours after lunch and dinner low 200s.  Discussed and provided with target BG ranges.     Literature provided: Diabetes Nutrition [employer], Choose [employer] Booklet    Phone number provided and encouraged to call with questions or concerns.     Time spent w/ pt: 30 minutes    Yun Jules RD  06/11/2021

## 2021-06-19 RX ORDER — LEVOTHYROXINE SODIUM 0.2 MG/1
200 TABLET ORAL DAILY
Qty: 30 TABLET | Refills: 5 | Status: SHIPPED | OUTPATIENT
Start: 2021-06-19 | End: 2021-07-12 | Stop reason: SDUPTHER

## 2021-06-24 DIAGNOSIS — E11.65 UNCONTROLLED TYPE 2 DIABETES MELLITUS WITH HYPERGLYCEMIA (HCC): Primary | ICD-10-CM

## 2021-07-11 PROBLEM — E66.813 CLASS 3 SEVERE OBESITY WITHOUT SERIOUS COMORBIDITY WITH BODY MASS INDEX (BMI) OF 40.0 TO 44.9 IN ADULT: Status: ACTIVE | Noted: 2021-07-11

## 2021-07-11 PROBLEM — Z79.01 CHRONIC ANTICOAGULATION: Status: ACTIVE | Noted: 2021-07-11

## 2021-07-11 PROBLEM — Z79.4 TYPE 2 DIABETES MELLITUS WITH DIABETIC NEUROPATHY, WITH LONG-TERM CURRENT USE OF INSULIN (HCC): Status: ACTIVE | Noted: 2021-07-11

## 2021-07-11 PROBLEM — E78.49 OTHER HYPERLIPIDEMIA: Status: ACTIVE | Noted: 2021-07-11

## 2021-07-11 PROBLEM — E89.0 POSTOPERATIVE HYPOTHYROIDISM: Status: ACTIVE | Noted: 2021-07-11

## 2021-07-11 PROBLEM — I10 ESSENTIAL HYPERTENSION: Status: ACTIVE | Noted: 2021-07-11

## 2021-07-11 PROBLEM — IMO0002 UNCONTROLLED TYPE 2 DIABETES MELLITUS: Status: ACTIVE | Noted: 2021-07-11

## 2021-07-11 PROBLEM — E66.01 CLASS 3 SEVERE OBESITY WITHOUT SERIOUS COMORBIDITY WITH BODY MASS INDEX (BMI) OF 40.0 TO 44.9 IN ADULT: Status: ACTIVE | Noted: 2021-07-11

## 2021-07-11 PROBLEM — E11.40 TYPE 2 DIABETES MELLITUS WITH DIABETIC NEUROPATHY, WITH LONG-TERM CURRENT USE OF INSULIN (HCC): Status: ACTIVE | Noted: 2021-07-11

## 2021-07-11 PROBLEM — Z86.711 HISTORY OF PULMONARY EMBOLUS (PE): Status: ACTIVE | Noted: 2021-07-11

## 2021-07-12 ENCOUNTER — OFFICE VISIT (OUTPATIENT)
Dept: INTERNAL MEDICINE | Facility: CLINIC | Age: 51
End: 2021-07-12

## 2021-07-12 VITALS
SYSTOLIC BLOOD PRESSURE: 157 MMHG | HEART RATE: 78 BPM | HEIGHT: 64 IN | OXYGEN SATURATION: 98 % | TEMPERATURE: 97.7 F | DIASTOLIC BLOOD PRESSURE: 87 MMHG | BODY MASS INDEX: 40.12 KG/M2 | WEIGHT: 235 LBS

## 2021-07-12 DIAGNOSIS — Z86.711 HISTORY OF PULMONARY EMBOLUS (PE): ICD-10-CM

## 2021-07-12 DIAGNOSIS — I10 ESSENTIAL HYPERTENSION: ICD-10-CM

## 2021-07-12 DIAGNOSIS — Z12.11 COLON CANCER SCREENING: ICD-10-CM

## 2021-07-12 DIAGNOSIS — E89.0 POSTOPERATIVE HYPOTHYROIDISM: ICD-10-CM

## 2021-07-12 DIAGNOSIS — Z12.31 VISIT FOR SCREENING MAMMOGRAM: ICD-10-CM

## 2021-07-12 DIAGNOSIS — E11.40 TYPE 2 DIABETES MELLITUS WITH DIABETIC NEUROPATHY, WITH LONG-TERM CURRENT USE OF INSULIN (HCC): ICD-10-CM

## 2021-07-12 DIAGNOSIS — E66.01 CLASS 3 SEVERE OBESITY DUE TO EXCESS CALORIES WITHOUT SERIOUS COMORBIDITY WITH BODY MASS INDEX (BMI) OF 40.0 TO 44.9 IN ADULT (HCC): ICD-10-CM

## 2021-07-12 DIAGNOSIS — E78.49 OTHER HYPERLIPIDEMIA: ICD-10-CM

## 2021-07-12 DIAGNOSIS — H43.11 VITREOUS HEMORRHAGE OF RIGHT EYE (HCC): ICD-10-CM

## 2021-07-12 DIAGNOSIS — Z79.4 TYPE 2 DIABETES MELLITUS WITH DIABETIC NEUROPATHY, WITH LONG-TERM CURRENT USE OF INSULIN (HCC): ICD-10-CM

## 2021-07-12 DIAGNOSIS — E11.65 UNCONTROLLED TYPE 2 DIABETES MELLITUS WITH HYPERGLYCEMIA (HCC): ICD-10-CM

## 2021-07-12 DIAGNOSIS — R05.3 CHRONIC COUGH: ICD-10-CM

## 2021-07-12 DIAGNOSIS — Z79.01 CHRONIC ANTICOAGULATION: ICD-10-CM

## 2021-07-12 DIAGNOSIS — Z76.89 ESTABLISHING CARE WITH NEW DOCTOR, ENCOUNTER FOR: Primary | ICD-10-CM

## 2021-07-12 LAB
ALBUMIN SERPL-MCNC: 4 G/DL (ref 3.5–5.2)
ALBUMIN UR-MCNC: <1.2 MG/DL
ALBUMIN/GLOB SERPL: 1.4 G/DL
ALP SERPL-CCNC: 88 U/L (ref 39–117)
ALT SERPL W P-5'-P-CCNC: 20 U/L (ref 1–33)
AMPHET+METHAMPHET UR QL: NEGATIVE
AMPHETAMINE INTERNAL CONTROL: NORMAL
AMPHETAMINES UR QL: NEGATIVE
ANION GAP SERPL CALCULATED.3IONS-SCNC: 8.3 MMOL/L (ref 5–15)
AST SERPL-CCNC: 19 U/L (ref 1–32)
BARBITURATE INTERNAL CONTROL: NORMAL
BARBITURATES UR QL SCN: NEGATIVE
BASOPHILS # BLD AUTO: 0.06 10*3/MM3 (ref 0–0.2)
BASOPHILS NFR BLD AUTO: 0.8 % (ref 0–1.5)
BENZODIAZ UR QL SCN: NEGATIVE
BENZODIAZEPINE INTERNAL CONTROL: NORMAL
BILIRUB SERPL-MCNC: 0.4 MG/DL (ref 0–1.2)
BUN SERPL-MCNC: 13 MG/DL (ref 6–20)
BUN/CREAT SERPL: 13.7 (ref 7–25)
BUPRENORPHINE INTERNAL CONTROL: NORMAL
BUPRENORPHINE SERPL-MCNC: NEGATIVE NG/ML
CALCIUM SPEC-SCNC: 8.9 MG/DL (ref 8.6–10.5)
CANNABINOIDS SERPL QL: NEGATIVE
CHLORIDE SERPL-SCNC: 102 MMOL/L (ref 98–107)
CHOLEST SERPL-MCNC: 164 MG/DL (ref 0–200)
CO2 SERPL-SCNC: 26.7 MMOL/L (ref 22–29)
COCAINE INTERNAL CONTROL: NORMAL
COCAINE UR QL: NEGATIVE
CREAT SERPL-MCNC: 0.95 MG/DL (ref 0.57–1)
DEPRECATED RDW RBC AUTO: 45.1 FL (ref 37–54)
EOSINOPHIL # BLD AUTO: 0.07 10*3/MM3 (ref 0–0.4)
EOSINOPHIL NFR BLD AUTO: 1 % (ref 0.3–6.2)
ERYTHROCYTE [DISTWIDTH] IN BLOOD BY AUTOMATED COUNT: 12.6 % (ref 12.3–15.4)
EXPIRATION DATE: NORMAL
GFR SERPL CREATININE-BSD FRML MDRD: 62 ML/MIN/1.73
GLOBULIN UR ELPH-MCNC: 2.9 GM/DL
GLUCOSE SERPL-MCNC: 210 MG/DL (ref 65–99)
HBA1C MFR BLD: 7.53 % (ref 4.8–5.6)
HCT VFR BLD AUTO: 42.7 % (ref 34–46.6)
HDLC SERPL-MCNC: 49 MG/DL (ref 40–60)
HGB BLD-MCNC: 14.2 G/DL (ref 12–15.9)
IMM GRANULOCYTES # BLD AUTO: 0.03 10*3/MM3 (ref 0–0.05)
IMM GRANULOCYTES NFR BLD AUTO: 0.4 % (ref 0–0.5)
LDLC SERPL CALC-MCNC: 98 MG/DL (ref 0–100)
LDLC/HDLC SERPL: 1.98 {RATIO}
LYMPHOCYTES # BLD AUTO: 1.5 10*3/MM3 (ref 0.7–3.1)
LYMPHOCYTES NFR BLD AUTO: 21.2 % (ref 19.6–45.3)
Lab: NORMAL
MCH RBC QN AUTO: 32.1 PG (ref 26.6–33)
MCHC RBC AUTO-ENTMCNC: 33.3 G/DL (ref 31.5–35.7)
MCV RBC AUTO: 96.4 FL (ref 79–97)
MDMA (ECSTASY) INTERNAL CONTROL: NORMAL
MDMA UR QL SCN: NEGATIVE
METHADONE INTERNAL CONTROL: NORMAL
METHADONE UR QL SCN: NEGATIVE
METHAMPHETAMINE INTERNAL CONTROL: NORMAL
MONOCYTES # BLD AUTO: 0.35 10*3/MM3 (ref 0.1–0.9)
MONOCYTES NFR BLD AUTO: 5 % (ref 5–12)
NEUTROPHILS NFR BLD AUTO: 5.05 10*3/MM3 (ref 1.7–7)
NEUTROPHILS NFR BLD AUTO: 71.6 % (ref 42.7–76)
NRBC BLD AUTO-RTO: 0 /100 WBC (ref 0–0.2)
OPIATES INTERNAL CONTROL: NORMAL
OPIATES UR QL: NEGATIVE
OXYCODONE INTERNAL CONTROL: NORMAL
OXYCODONE UR QL SCN: NEGATIVE
PCP UR QL SCN: NEGATIVE
PHENCYCLIDINE INTERNAL CONTROL: NORMAL
PLATELET # BLD AUTO: 280 10*3/MM3 (ref 140–450)
PMV BLD AUTO: 10.6 FL (ref 6–12)
POTASSIUM SERPL-SCNC: 4.6 MMOL/L (ref 3.5–5.2)
PROT SERPL-MCNC: 6.9 G/DL (ref 6–8.5)
RBC # BLD AUTO: 4.43 10*6/MM3 (ref 3.77–5.28)
SODIUM SERPL-SCNC: 137 MMOL/L (ref 136–145)
THC INTERNAL CONTROL: NORMAL
TRIGL SERPL-MCNC: 90 MG/DL (ref 0–150)
TSH SERPL DL<=0.05 MIU/L-ACNC: 0.14 UIU/ML (ref 0.27–4.2)
VLDLC SERPL-MCNC: 17 MG/DL (ref 5–40)
WBC # BLD AUTO: 7.06 10*3/MM3 (ref 3.4–10.8)

## 2021-07-12 PROCEDURE — 99204 OFFICE O/P NEW MOD 45 MIN: CPT | Performed by: STUDENT IN AN ORGANIZED HEALTH CARE EDUCATION/TRAINING PROGRAM

## 2021-07-12 PROCEDURE — 85025 COMPLETE CBC W/AUTO DIFF WBC: CPT | Performed by: STUDENT IN AN ORGANIZED HEALTH CARE EDUCATION/TRAINING PROGRAM

## 2021-07-12 PROCEDURE — 82043 UR ALBUMIN QUANTITATIVE: CPT | Performed by: STUDENT IN AN ORGANIZED HEALTH CARE EDUCATION/TRAINING PROGRAM

## 2021-07-12 PROCEDURE — 80053 COMPREHEN METABOLIC PANEL: CPT | Performed by: STUDENT IN AN ORGANIZED HEALTH CARE EDUCATION/TRAINING PROGRAM

## 2021-07-12 PROCEDURE — 80061 LIPID PANEL: CPT | Performed by: STUDENT IN AN ORGANIZED HEALTH CARE EDUCATION/TRAINING PROGRAM

## 2021-07-12 PROCEDURE — 84443 ASSAY THYROID STIM HORMONE: CPT | Performed by: STUDENT IN AN ORGANIZED HEALTH CARE EDUCATION/TRAINING PROGRAM

## 2021-07-12 PROCEDURE — 80305 DRUG TEST PRSMV DIR OPT OBS: CPT | Performed by: STUDENT IN AN ORGANIZED HEALTH CARE EDUCATION/TRAINING PROGRAM

## 2021-07-12 PROCEDURE — 83036 HEMOGLOBIN GLYCOSYLATED A1C: CPT | Performed by: STUDENT IN AN ORGANIZED HEALTH CARE EDUCATION/TRAINING PROGRAM

## 2021-07-12 RX ORDER — APIXABAN 5 MG/1
5 TABLET, FILM COATED ORAL 2 TIMES DAILY
Qty: 60 TABLET | Refills: 6 | Status: SHIPPED | OUTPATIENT
Start: 2021-07-12 | End: 2022-09-06 | Stop reason: SDUPTHER

## 2021-07-12 RX ORDER — GABAPENTIN 600 MG/1
600 TABLET ORAL DAILY
Qty: 30 TABLET | Refills: 2 | Status: SHIPPED | OUTPATIENT
Start: 2021-07-12 | End: 2022-10-13

## 2021-07-12 RX ORDER — INSULIN DEGLUDEC INJECTION 100 U/ML
INJECTION, SOLUTION SUBCUTANEOUS
COMMUNITY
Start: 2021-06-01 | End: 2021-07-12 | Stop reason: SDUPTHER

## 2021-07-12 RX ORDER — LEVOTHYROXINE SODIUM 0.2 MG/1
200 TABLET ORAL DAILY
Qty: 30 TABLET | Refills: 5 | Status: SHIPPED | OUTPATIENT
Start: 2021-07-12 | End: 2021-07-13

## 2021-07-12 RX ORDER — LISINOPRIL 10 MG/1
10 TABLET ORAL DAILY
Qty: 30 TABLET | Refills: 11 | Status: SHIPPED | OUTPATIENT
Start: 2021-07-12 | End: 2022-09-06 | Stop reason: SDUPTHER

## 2021-07-12 RX ORDER — APIXABAN 5 MG/1
5 TABLET, FILM COATED ORAL 2 TIMES DAILY
COMMUNITY
Start: 2021-04-22 | End: 2021-07-12 | Stop reason: SDUPTHER

## 2021-07-12 RX ORDER — DULOXETIN HYDROCHLORIDE 60 MG/1
60 CAPSULE, DELAYED RELEASE ORAL DAILY
Qty: 30 CAPSULE | Refills: 3 | Status: SHIPPED | OUTPATIENT
Start: 2021-07-12 | End: 2021-12-01 | Stop reason: SDUPTHER

## 2021-07-12 RX ORDER — ATORVASTATIN CALCIUM 10 MG/1
10 TABLET, FILM COATED ORAL DAILY
COMMUNITY
End: 2021-07-12 | Stop reason: SDUPTHER

## 2021-07-12 RX ORDER — ROPINIROLE 0.25 MG/1
0.25 TABLET, FILM COATED ORAL NIGHTLY
Qty: 30 TABLET | Refills: 11 | Status: SHIPPED | OUTPATIENT
Start: 2021-07-12 | End: 2022-09-06 | Stop reason: SDUPTHER

## 2021-07-12 RX ORDER — INSULIN LISPRO 100 [IU]/ML
5 INJECTION, SOLUTION INTRAVENOUS; SUBCUTANEOUS
Qty: 3 ML | Refills: 11 | Status: SHIPPED | OUTPATIENT
Start: 2021-07-12 | End: 2022-06-08

## 2021-07-12 RX ORDER — INSULIN DEGLUDEC INJECTION 100 U/ML
28 INJECTION, SOLUTION SUBCUTANEOUS DAILY
Qty: 10 ML | Refills: 11 | Status: SHIPPED | OUTPATIENT
Start: 2021-07-12 | End: 2022-09-06 | Stop reason: SDUPTHER

## 2021-07-12 RX ORDER — ATORVASTATIN CALCIUM 10 MG/1
10 TABLET, FILM COATED ORAL DAILY
Qty: 90 TABLET | Refills: 6 | Status: SHIPPED | OUTPATIENT
Start: 2021-07-12 | End: 2022-09-06 | Stop reason: SDUPTHER

## 2021-07-12 RX ORDER — GABAPENTIN 600 MG/1
TABLET ORAL
COMMUNITY
Start: 2021-05-28 | End: 2021-07-12 | Stop reason: SDUPTHER

## 2021-07-12 RX ORDER — INSULIN DEGLUDEC INJECTION 100 U/ML
25 INJECTION, SOLUTION SUBCUTANEOUS DAILY
Qty: 10 ML | Refills: 11 | Status: SHIPPED | OUTPATIENT
Start: 2021-07-12 | End: 2021-07-12

## 2021-07-12 RX ORDER — PANTOPRAZOLE SODIUM 40 MG/1
40 TABLET, DELAYED RELEASE ORAL DAILY
Qty: 90 TABLET | Refills: 2 | Status: SHIPPED | OUTPATIENT
Start: 2021-07-12 | End: 2022-02-10 | Stop reason: SDUPTHER

## 2021-07-12 RX ORDER — ROPINIROLE 0.25 MG/1
TABLET, FILM COATED ORAL
COMMUNITY
Start: 2021-05-04 | End: 2021-07-12 | Stop reason: SDUPTHER

## 2021-07-12 NOTE — PROGRESS NOTES
"Chief Complaint  Establish Care (NEW PATIENT ) and Diabetes    Subjective          Carmelina Saldana presents to [facility]  History of Present Illness    Previous PCP: Dr Sadler (RETIRED)    Specialist(s): Scarlet Landeros (Diabetes), Dr. Wilson in [place] (Eyes)    Immunizations: s/p Moderna COVID x 2.  Has 2020 flu shot.    Colon cancer screenin colonoscopy by Dr. Prather showing polyps (not removed due to chronic anticoagulation)    Mammogram: Never    Pap Smear: Last pap 2021 with Dr. Mckeon ([facility]).  Has had hysterectomy    T2DM:    Taking 28U tresiba a day, and 5U humalog TID with meals.  No correction factor or sliding scale.  Had GI problems with metformin, and now off this medicine.  Taking gabapentin 600 mg once nightly for neuropathic foot pain, and still having pain making it difficult to sleep.  AM blood sugar measurements lately around 130.  Eye exam  at [facility].  Getting eye procedure in near future for vitreous hemorrhage.    Restless Leg:  Well controlled on ropinirole    HTN:    Systolic 130s over 70s at home.  Checks 1-2 times a week.    History of unprovoked DVTs and PE:    On chronic eliquis    Menorrhagia:    S/p hysterectomy and b/l salpingectomy    Hypothyroid:    Hx of thyroid nodules, s/p thyroidectomy.  On synthroid.    Chronic cough:    For about a year.  Has been on lisinopril for several years.  Cough worse when she lies down.      Objective   Vital Signs:   /87 (BP Location: Left arm, Patient Position: Sitting)   Pulse 78   Temp 97.7 °F (36.5 °C) (Temporal)   Ht 162.6 cm (64\")   Wt 107 kg (235 lb)   SpO2 98%   BMI 40.34 kg/m²     Physical Exam  Constitutional:       General: She is not in acute distress.     Appearance: Normal appearance. She is obese. She is not toxic-appearing.   HENT:      Head: Normocephalic and atraumatic.      Right Ear: External ear normal.      Left Ear: " External ear normal.      Nose: Nose normal.   Eyes:      Extraocular Movements: Extraocular movements intact.      Conjunctiva/sclera: Conjunctivae normal.      Pupils: Pupils are equal, round, and reactive to light.        Comments: Vitreous hemorrhage noted right eye   Cardiovascular:      Rate and Rhythm: Normal rate and regular rhythm.      Pulses: Normal pulses.      Heart sounds: Normal heart sounds. No murmur heard.   No friction rub. No gallop.    Pulmonary:      Effort: Pulmonary effort is normal.      Breath sounds: Normal breath sounds. No wheezing.   Abdominal:      General: Abdomen is flat.      Palpations: Abdomen is soft.   Musculoskeletal:         General: No swelling.   Skin:     General: Skin is warm and dry.   Neurological:      General: No focal deficit present.      Mental Status: She is alert. Mental status is at baseline.      Deep Tendon Reflexes: Reflexes normal.   Psychiatric:         Mood and Affect: Mood normal.         Behavior: Behavior normal.         Thought Content: Thought content normal.         Judgment: Judgment normal.            Result Review :   The following data was reviewed by: Guy Beatty MD on 07/12/2021:  Common labs    Common Labsle 12/8/20 12/14/20 2/24/21 2/24/21      [id number]   Glucose   246 (A)    BUN   18    Creatinine   0.95 (A)    Sodium   134 (A)    Potassium   4.3    Chloride   100    Calcium   8.8    Albumin   4.1    Total Bilirubin   0.44    Alkaline Phosphatase   106    AST (SGOT)   21    ALT (SGPT)   19    WBC   7.24    Hemoglobin 11.5 (A) 12.3 14.2    Hematocrit 36.4 (A) 39.7 43.5    Platelets   232    Total Cholesterol   213 (A)    Triglycerides   119    HDL Cholesterol   73 (A)    LDL Cholesterol    116 (A)    Hemoglobin A1C    9.7 (A)   (A) Abnormal value       Comments are available for some flowsheets but are not being displayed.              Procedures      Assessment and Plan    Diagnoses and all orders for this visit:    1. Establishing care  with new doctor, encounter for (Primary)  -     CBC & Differential  -     Comprehensive Metabolic Panel  -     POC Urine Drug Screen [facility]    2. Essential hypertension  -     CBC & Differential  -     Comprehensive Metabolic Panel  -     POC Urine Drug Screen [facility]    3. Class 3 severe obesity due to excess calories without serious comorbidity with body mass index (BMI) of 40.0 to 44.9 in adult (CMS/[facility])  -     POC Urine Drug Screen [facility]    4. Postoperative hypothyroidism  -     TSH  -     POC Urine Drug Screen [facility]    5. Type 2 diabetes mellitus with diabetic neuropathy, with long-term current use of insulin (CMS/[facility])  Assessment & Plan:  -will add cymbalta to gabapentin, as neuropathy inadequately controlled  -checking A1c and microalbumin    Orders:  -     MicroAlbumin, Urine, Random - Urine, Clean Catch  -     CBC & Differential  -     Comprehensive Metabolic Panel  -     Hemoglobin A1c  -     Cancel: Urine Drug Screen - Urine, Clean Catch  -     POC Urine Drug Screen [facility]  -     gabapentin (NEURONTIN) 600 MG tablet; Take 1 tablet by mouth Daily.  Dispense: 30 tablet; Refill: 2    6. Chronic anticoagulation  -     POC Urine Drug Screen [facility]    7. History of pulmonary embolus (PE)  Assessment & Plan:  -unprovoked, on eliquis    Orders:  -     POC Urine Drug Screen [facility]    8. Other hyperlipidemia  -     Lipid Panel  -     POC Urine Drug Screen [facility]    9. Uncontrolled type 2 diabetes mellitus with hyperglycemia (CMS/[facility])  Assessment & Plan:  -A1c 9.7%  -follows with Scarlet Landeros  -will check A1c and microalbumin    Orders:  -     POC Urine Drug Screen [facility]    10. Colon cancer screening  -     Ambulatory Referral For Screening Colonoscopy  -     POC Urine Drug Screen [facility]    11. Visit for screening mammogram  -     Mammo Screening Bilateral With CAD; Future  -     POC Urine Drug Screen [facility]    12.  Chronic cough  Assessment & Plan:  -will empirically treat with PPI  -if no improvement, will substitute ARB for lisinopril    Orders:  -     POC Urine Drug Screen [facility]    13. Vitreous hemorrhage of right eye (CMS/HCC)    Other orders  -     pantoprazole (PROTONIX) 40 MG EC tablet; Take 1 tablet by mouth Daily.  Dispense: 90 tablet; Refill: 2  -     DULoxetine (CYMBALTA) 60 MG capsule; Take 1 capsule by mouth Daily.  Dispense: 30 capsule; Refill: 3  -     atorvastatin (LIPITOR) 10 MG tablet; Take 1 tablet by mouth Daily.  Dispense: 90 tablet; Refill: 6  -     Eliquis 5 MG tablet tablet; Take 1 tablet by mouth 2 (Two) Times a Day.  Dispense: 60 tablet; Refill: 6  -     Insulin Lispro, 1 Unit Dial, (HumaLOG KwikPen) 100 UNIT/ML solution pen-injector; Inject 5 Units under the skin into the appropriate area as directed 3 (Three) Times a Day With Meals.  Dispense: 3 mL; Refill: 11  -     levothyroxine (SYNTHROID, LEVOTHROID) 200 MCG tablet; Take 1 tablet by mouth Daily.  Dispense: 30 tablet; Refill: 5  -     lisinopril (PRINIVIL,ZESTRIL) 10 MG tablet; Take 1 tablet by mouth Daily.  Dispense: 30 tablet; Refill: 11  -     rOPINIRole (REQUIP) 0.25 MG tablet; Take 1 tablet by mouth Every Night.  Dispense: 30 tablet; Refill: 11  -     Tresiba FlexTouch 100 UNIT/ML solution pen-injector injection; Inject 25 Units under the skin into the appropriate area as directed Daily. 28 UNITS DAILY  Dispense: 10 mL; Refill: 11      Follow Up   Return in about 3 months (around 10/12/2021) for diabetes.  Patient was given instructions and counseling regarding her condition or for health maintenance advice. Please see specific information pulled into the AVS if appropriate.

## 2021-07-13 ENCOUNTER — TELEPHONE (OUTPATIENT)
Dept: INTERNAL MEDICINE | Facility: CLINIC | Age: 51
End: 2021-07-13

## 2021-07-13 RX ORDER — LEVOTHYROXINE SODIUM 175 UG/1
175 TABLET ORAL DAILY
Qty: 30 TABLET | Refills: 5 | Status: SHIPPED | OUTPATIENT
Start: 2021-07-13 | End: 2022-02-20 | Stop reason: SDUPTHER

## 2021-07-13 NOTE — PROGRESS NOTES
Your TSH suggests that your thyroid medicine dose is a little high.  We will start 175 micrograms of synthroid.  Prescription sent.    Your lipids are unremarkable.    Your A1c is 7.53%.  This is a big improvement over 9.7%.  Keep up the good work!    Your CBC and CMP were okay (elevated blood sugar noted).  Your urine labs did not show any protein in the urine.

## 2021-07-13 NOTE — ASSESSMENT & PLAN NOTE
-will add cymbalta to gabapentin, as neuropathy inadequately controlled  -checking A1c and microalbumin

## 2021-07-14 ENCOUNTER — TELEPHONE (OUTPATIENT)
Dept: INTERNAL MEDICINE | Facility: CLINIC | Age: 51
End: 2021-07-14

## 2021-08-12 ENCOUNTER — TELEPHONE (OUTPATIENT)
Dept: INTERNAL MEDICINE | Facility: CLINIC | Age: 51
End: 2021-08-12

## 2021-08-12 NOTE — TELEPHONE ENCOUNTER
ATTEMPTED TO CONTACT PT PER PROVIDER'S INSTRUCTIONS     NO ANSWER     LEFT VOICEMAIL WITH INSTRUCTIONS TO RETURN CALL TO OFFICE AT [phone]    OK FOR HUB TO ADVISE/READ     PT IS OVERDUE FOR MAMMOGRAM     PLEASE CALL [facility] ([place])    Phone: [phone]    Hours of Operations: 6:30 a.m. - 5:00 p.m. (Mon-Fri)    ADDRESS  [address] [place] [address]

## 2021-09-30 ENCOUNTER — LAB (OUTPATIENT)
Dept: LAB | Facility: HOSPITAL | Age: 51
End: 2021-09-30

## 2021-09-30 ENCOUNTER — TRANSCRIBE ORDERS (OUTPATIENT)
Dept: LAB | Facility: HOSPITAL | Age: 51
End: 2021-09-30

## 2021-09-30 DIAGNOSIS — Z11.52 ENCOUNTER FOR SCREENING FOR COVID-19: ICD-10-CM

## 2021-09-30 DIAGNOSIS — Z11.52 ENCOUNTER FOR SCREENING FOR COVID-19: Primary | ICD-10-CM

## 2021-09-30 PROCEDURE — C9803 HOPD COVID-19 SPEC COLLECT: HCPCS

## 2021-09-30 PROCEDURE — U0004 COV-19 TEST NON-CDC HGH THRU: HCPCS

## 2021-10-01 LAB — SARS-COV-2 RNA NOSE QL NAA+PROBE: NOT DETECTED

## 2021-10-14 ENCOUNTER — TELEPHONE (OUTPATIENT)
Dept: INTERNAL MEDICINE | Facility: CLINIC | Age: 51
End: 2021-10-14

## 2021-10-14 NOTE — TELEPHONE ENCOUNTER
ATTEMPTED TO CONTACT PT PER PROVIDER'S INSTRUCTIONS     NO ANSWER     LEFT VOICEMAIL WITH INSTRUCTIONS TO RETURN CALL TO OFFICE AT [phone]    OK FOR HUB TO READ/ADVISE  PT HAS OVERDUE LAB RESULTS  Mammo Screening Bilateral With CAD (07/12/2021 14:44)    PT CAN GO TO OUTPATIENT LAB TO COMPLETE    [facility]ST[facility] SERVICES [place]  PHONE [phone]    OPT 2 DIAGNOSTICS/IMAGING/[place] (CT/MRI/XRAY ORDERS)    DIAG[facility]STICS/IMAGING/[place] MENU  [address] [address]    HOURS 730AM - 5PM    OPT 1 APPT SCHEDULING

## 2021-10-18 NOTE — TELEPHONE ENCOUNTER
ATTEMPTED TO CONTACT PT PER PROVIDER'S INSTRUCTIONS      NO ANSWER      LEFT VOICEMAIL WITH INSTRUCTIONS TO RETURN CALL TO OFFICE AT [phone]     OK FOR HUB TO READ/ADVISE  PT HAS OVERDUE LAB RESULTS  Mammo Screening Bilateral With CAD (07/12/2021 14:44)     PT CAN GO TO OUTPATIENT LAB TO COMPLETE     [facility]ST[facility] [place]  PHONE [phone]     OPT 2 DIAGNOSTICS/IMAGING/[place] (CT/MRI/XRAY ORDERS)     DI[facility]STICS/IMAGING/[place] MENU  [address] [address]     HOURS 730AM - 5PM     OPT 1 APPT SCHEDULING

## 2021-10-19 NOTE — TELEPHONE ENCOUNTER
ATTEMPTED TO CONTACT PT PER PROVIDER'S INSTRUCTIONS      NO ANSWER      LEFT VOICEMAIL WITH INSTRUCTIONS TO RETURN CALL TO OFFICE AT [phone]     OK FOR HUB TO READ/ADVISE  PT HAS OVERDUE LAB RESULTS  Mammo Screening Bilateral With CAD (07/12/2021 14:44)     PT CAN GO TO OUTPATIENT LAB TO COMPLETE     [facility]AGNOSTICS/LAB [facility] [place]  PHONE [phone]     OPT 2 DIAGNOSTICS/IMAGING/[place] (CT/MRI/XRAY ORDERS)     DI[facility]NOSTICS/IMAGING/[place] MENU  [address] [address]     HOURS 730AM - 5PM     OPT 1 APPT SCHEDULING

## 2021-12-02 RX ORDER — DULOXETIN HYDROCHLORIDE 60 MG/1
60 CAPSULE, DELAYED RELEASE ORAL DAILY
Qty: 90 CAPSULE | Refills: 3 | Status: SHIPPED | OUTPATIENT
Start: 2021-12-02 | End: 2022-12-30 | Stop reason: SDUPTHER

## 2021-12-28 DIAGNOSIS — E11.40 TYPE 2 DIABETES MELLITUS WITH DIABETIC NEUROPATHY, WITH LONG-TERM CURRENT USE OF INSULIN: ICD-10-CM

## 2021-12-28 DIAGNOSIS — Z79.4 TYPE 2 DIABETES MELLITUS WITH DIABETIC NEUROPATHY, WITH LONG-TERM CURRENT USE OF INSULIN: ICD-10-CM

## 2021-12-29 RX ORDER — GABAPENTIN 600 MG/1
600 TABLET ORAL DAILY
Qty: 30 TABLET | Refills: 2 | OUTPATIENT
Start: 2021-12-29

## 2021-12-29 RX ORDER — GABAPENTIN 600 MG/1
TABLET ORAL
Qty: 60 TABLET | Refills: 0 | OUTPATIENT
Start: 2021-12-29

## 2022-01-24 RX ORDER — GABAPENTIN 600 MG/1
TABLET ORAL
Qty: 60 TABLET | Refills: 0 | OUTPATIENT
Start: 2022-01-24

## 2022-02-10 DIAGNOSIS — Z79.4 TYPE 2 DIABETES MELLITUS WITH DIABETIC NEUROPATHY, WITH LONG-TERM CURRENT USE OF INSULIN: Primary | ICD-10-CM

## 2022-02-10 DIAGNOSIS — E11.40 TYPE 2 DIABETES MELLITUS WITH DIABETIC NEUROPATHY, WITH LONG-TERM CURRENT USE OF INSULIN: Primary | ICD-10-CM

## 2022-02-10 RX ORDER — PANTOPRAZOLE SODIUM 40 MG/1
40 TABLET, DELAYED RELEASE ORAL DAILY
Qty: 90 TABLET | Refills: 2 | Status: SHIPPED | OUTPATIENT
Start: 2022-02-10 | End: 2022-12-30 | Stop reason: SDUPTHER

## 2022-02-10 RX ORDER — GABAPENTIN 600 MG/1
TABLET ORAL
Qty: 60 TABLET | Refills: 0 | OUTPATIENT
Start: 2022-02-10

## 2022-02-20 DIAGNOSIS — E03.8 OTHER SPECIFIED HYPOTHYROIDISM: Primary | ICD-10-CM

## 2022-02-21 RX ORDER — LEVOTHYROXINE SODIUM 175 UG/1
175 TABLET ORAL DAILY
Qty: 30 TABLET | Refills: 5 | Status: SHIPPED | OUTPATIENT
Start: 2022-02-21 | End: 2022-10-14 | Stop reason: SDUPTHER

## 2022-02-21 NOTE — TELEPHONE ENCOUNTER
Thyroid Hormones Protocol Failed 02/20/2022 04:57 PM   Protocol Details  Normal TSH in past 12 months    No active pregnancy on record    No positive pregnancy test in past year    Recent or future visit with authorizing provider

## 2022-06-08 ENCOUNTER — TELEPHONE (OUTPATIENT)
Dept: INTERNAL MEDICINE | Facility: CLINIC | Age: 52
End: 2022-06-08

## 2022-06-08 DIAGNOSIS — Z79.4 TYPE 2 DIABETES MELLITUS WITH DIABETIC NEUROPATHY, WITH LONG-TERM CURRENT USE OF INSULIN: Primary | ICD-10-CM

## 2022-06-08 DIAGNOSIS — E11.40 TYPE 2 DIABETES MELLITUS WITH DIABETIC NEUROPATHY, WITH LONG-TERM CURRENT USE OF INSULIN: Primary | ICD-10-CM

## 2022-06-08 RX ORDER — INSULIN ASPART 100 [IU]/ML
5 INJECTION, SOLUTION INTRAVENOUS; SUBCUTANEOUS
Qty: 15 ML | Refills: 3 | Status: SHIPPED | OUTPATIENT
Start: 2022-06-08 | End: 2022-10-13 | Stop reason: SDUPTHER

## 2022-06-08 NOTE — TELEPHONE ENCOUNTER
INDEXED VIA ONBASE    MED NOT FORMULARY    Insulin Lispro, 1 Unit Dial, (HumaLOG KwikPen) 100 UNIT/ML solution pen-injector (07/12/2021)    ALT LISTED NOVOLOG OR FIASP    PROVIDER PLEASE ADVISE

## 2022-07-21 NOTE — TELEPHONE ENCOUNTER
Insulin Protocol Failed 07/20/2022 09:35 PM   Protocol Details  Lipid profile in past 12 months    Recent or future visit with prescriber (6M/30D)    A1c in past 6 months    Creatinine < 1.3 in past 12 months    No active pregnancy on file    No positive pregnancy test in past 12 months

## 2022-07-22 RX ORDER — INSULIN DEGLUDEC INJECTION 100 U/ML
28 INJECTION, SOLUTION SUBCUTANEOUS DAILY
Qty: 10 ML | Refills: 11 | OUTPATIENT
Start: 2022-07-22

## 2022-07-22 NOTE — TELEPHONE ENCOUNTER
ATTEMPTED TO CONTACT PT PER PROVIDER'S INSTRUCTIONS     NO ANSWER     LEFT VOICEMAIL WITH INSTRUCTIONS TO RETURN CALL TO OFFICE AT [phone]    OK FOR HUB TO ADVISE/READ   Guy Beatty MD Just now (12:37 PM)        Miss Saldana will need an appointment before I will continue her refills.  It's been over a year.

## 2022-09-07 RX ORDER — INSULIN DEGLUDEC INJECTION 100 U/ML
28 INJECTION, SOLUTION SUBCUTANEOUS DAILY
Qty: 15 ML | Refills: 0 | Status: SHIPPED | OUTPATIENT
Start: 2022-09-07 | End: 2022-10-13 | Stop reason: SDUPTHER

## 2022-09-07 RX ORDER — ROPINIROLE 0.25 MG/1
0.25 TABLET, FILM COATED ORAL NIGHTLY
Qty: 30 TABLET | Refills: 0 | Status: SHIPPED | OUTPATIENT
Start: 2022-09-07 | End: 2022-10-14 | Stop reason: SDUPTHER

## 2022-09-07 RX ORDER — ATORVASTATIN CALCIUM 10 MG/1
10 TABLET, FILM COATED ORAL DAILY
Qty: 90 TABLET | Refills: 0 | Status: SHIPPED | OUTPATIENT
Start: 2022-09-07 | End: 2022-12-30 | Stop reason: SDUPTHER

## 2022-09-07 RX ORDER — LISINOPRIL 10 MG/1
10 TABLET ORAL DAILY
Qty: 30 TABLET | Refills: 0 | Status: SHIPPED | OUTPATIENT
Start: 2022-09-07 | End: 2022-10-13 | Stop reason: SDUPTHER

## 2022-09-07 RX ORDER — APIXABAN 5 MG/1
5 TABLET, FILM COATED ORAL 2 TIMES DAILY
Qty: 60 TABLET | Refills: 0 | Status: SHIPPED | OUTPATIENT
Start: 2022-09-07 | End: 2022-12-07 | Stop reason: SDUPTHER

## 2022-09-07 NOTE — TELEPHONE ENCOUNTER
It's been over a year since I last saw her.  I'm sending a one month supply of the prescriptions.  Could you contact her to schedule an annual physical exam?

## 2022-09-16 ENCOUNTER — TELEPHONE (OUTPATIENT)
Dept: INTERNAL MEDICINE | Facility: CLINIC | Age: 52
End: 2022-09-16

## 2022-09-16 NOTE — TELEPHONE ENCOUNTER
ATTEMPTED TO CONTACT PATIENT. LEFT MESSAGE TO CALL THE OFFICE BACK.      HUB OK TO READ/ADVISE:    DR. VILLALTA IS SENDING IN 1 MONTH WORTH OF THE PATIENT'S PRESCRIPTIONS. PATIENT NEEDS TO BE SCHEDULED FOR A PHYSICAL SINCE THEY HAVE NOT BEEN SEEN IN OVER A YEAR. PLEASE SCHEDULE PATIENT.

## 2022-10-03 DIAGNOSIS — E03.8 OTHER SPECIFIED HYPOTHYROIDISM: ICD-10-CM

## 2022-10-05 RX ORDER — ROPINIROLE 0.25 MG/1
0.25 TABLET, FILM COATED ORAL NIGHTLY
Qty: 30 TABLET | Refills: 2 | OUTPATIENT
Start: 2022-10-05

## 2022-10-05 RX ORDER — LEVOTHYROXINE SODIUM 175 UG/1
175 TABLET ORAL DAILY
Qty: 30 TABLET | Refills: 5 | OUTPATIENT
Start: 2022-10-05

## 2022-10-05 NOTE — TELEPHONE ENCOUNTER
Attempted to contact patient. Left message to call the office back.    HUB OK TO READ/ADVISE:    PATIENT NEEDS APPOINTMENT SCHEDULED BEFORE THEY CAN BE GIVEN REFILLS.     HUB PLEASE SCHEDULED PATIENT FOR A FOLLOW-UP OFFICE VISIT

## 2022-10-13 ENCOUNTER — OFFICE VISIT (OUTPATIENT)
Dept: INTERNAL MEDICINE | Facility: CLINIC | Age: 52
End: 2022-10-13

## 2022-10-13 VITALS
BODY MASS INDEX: 42.99 KG/M2 | DIASTOLIC BLOOD PRESSURE: 91 MMHG | SYSTOLIC BLOOD PRESSURE: 133 MMHG | OXYGEN SATURATION: 98 % | WEIGHT: 251.8 LBS | HEIGHT: 64 IN | HEART RATE: 93 BPM | TEMPERATURE: 98.4 F

## 2022-10-13 DIAGNOSIS — Z79.4 TYPE 2 DIABETES MELLITUS WITH DIABETIC NEUROPATHY, WITH LONG-TERM CURRENT USE OF INSULIN: ICD-10-CM

## 2022-10-13 DIAGNOSIS — E66.01 CLASS 3 SEVERE OBESITY DUE TO EXCESS CALORIES WITHOUT SERIOUS COMORBIDITY WITH BODY MASS INDEX (BMI) OF 40.0 TO 44.9 IN ADULT: ICD-10-CM

## 2022-10-13 DIAGNOSIS — E89.0 POSTOPERATIVE HYPOTHYROIDISM: ICD-10-CM

## 2022-10-13 DIAGNOSIS — E66.01 MORBID (SEVERE) OBESITY DUE TO EXCESS CALORIES: ICD-10-CM

## 2022-10-13 DIAGNOSIS — E11.40 TYPE 2 DIABETES MELLITUS WITH DIABETIC NEUROPATHY, WITH LONG-TERM CURRENT USE OF INSULIN: ICD-10-CM

## 2022-10-13 DIAGNOSIS — Z00.00 ANNUAL PHYSICAL EXAM: ICD-10-CM

## 2022-10-13 DIAGNOSIS — Z11.59 NEED FOR HEPATITIS C SCREENING TEST: ICD-10-CM

## 2022-10-13 DIAGNOSIS — Z79.899 MEDICATION MANAGEMENT: ICD-10-CM

## 2022-10-13 DIAGNOSIS — I10 ESSENTIAL HYPERTENSION: Primary | ICD-10-CM

## 2022-10-13 DIAGNOSIS — Z12.31 ENCOUNTER FOR SCREENING MAMMOGRAM FOR MALIGNANT NEOPLASM OF BREAST: ICD-10-CM

## 2022-10-13 DIAGNOSIS — Z23 ENCOUNTER FOR IMMUNIZATION: ICD-10-CM

## 2022-10-13 DIAGNOSIS — F32.A ANXIETY AND DEPRESSION: ICD-10-CM

## 2022-10-13 DIAGNOSIS — H43.12 VITREOUS HEMORRHAGE, LEFT EYE: ICD-10-CM

## 2022-10-13 DIAGNOSIS — E78.49 OTHER HYPERLIPIDEMIA: ICD-10-CM

## 2022-10-13 DIAGNOSIS — F41.9 ANXIETY AND DEPRESSION: ICD-10-CM

## 2022-10-13 LAB

## 2022-10-13 PROCEDURE — 80305 DRUG TEST PRSMV DIR OPT OBS: CPT | Performed by: STUDENT IN AN ORGANIZED HEALTH CARE EDUCATION/TRAINING PROGRAM

## 2022-10-13 PROCEDURE — 80061 LIPID PANEL: CPT | Performed by: STUDENT IN AN ORGANIZED HEALTH CARE EDUCATION/TRAINING PROGRAM

## 2022-10-13 PROCEDURE — 83036 HEMOGLOBIN GLYCOSYLATED A1C: CPT | Performed by: STUDENT IN AN ORGANIZED HEALTH CARE EDUCATION/TRAINING PROGRAM

## 2022-10-13 PROCEDURE — 90472 IMMUNIZATION ADMIN EACH ADD: CPT | Performed by: STUDENT IN AN ORGANIZED HEALTH CARE EDUCATION/TRAINING PROGRAM

## 2022-10-13 PROCEDURE — 90677 PCV20 VACCINE IM: CPT | Performed by: STUDENT IN AN ORGANIZED HEALTH CARE EDUCATION/TRAINING PROGRAM

## 2022-10-13 PROCEDURE — 90471 IMMUNIZATION ADMIN: CPT | Performed by: STUDENT IN AN ORGANIZED HEALTH CARE EDUCATION/TRAINING PROGRAM

## 2022-10-13 PROCEDURE — 0124A COVID-19 (PFIZER) BIVALENT BOOSTER 12+YRS: CPT | Performed by: STUDENT IN AN ORGANIZED HEALTH CARE EDUCATION/TRAINING PROGRAM

## 2022-10-13 PROCEDURE — 99214 OFFICE O/P EST MOD 30 MIN: CPT | Performed by: STUDENT IN AN ORGANIZED HEALTH CARE EDUCATION/TRAINING PROGRAM

## 2022-10-13 PROCEDURE — 90715 TDAP VACCINE 7 YRS/> IM: CPT | Performed by: STUDENT IN AN ORGANIZED HEALTH CARE EDUCATION/TRAINING PROGRAM

## 2022-10-13 PROCEDURE — 86803 HEPATITIS C AB TEST: CPT | Performed by: STUDENT IN AN ORGANIZED HEALTH CARE EDUCATION/TRAINING PROGRAM

## 2022-10-13 PROCEDURE — 90686 IIV4 VACC NO PRSV 0.5 ML IM: CPT | Performed by: STUDENT IN AN ORGANIZED HEALTH CARE EDUCATION/TRAINING PROGRAM

## 2022-10-13 PROCEDURE — 82570 ASSAY OF URINE CREATININE: CPT | Performed by: STUDENT IN AN ORGANIZED HEALTH CARE EDUCATION/TRAINING PROGRAM

## 2022-10-13 PROCEDURE — 91312 COVID-19 (PFIZER) BIVALENT BOOSTER 12+YRS: CPT | Performed by: STUDENT IN AN ORGANIZED HEALTH CARE EDUCATION/TRAINING PROGRAM

## 2022-10-13 PROCEDURE — 80050 GENERAL HEALTH PANEL: CPT | Performed by: STUDENT IN AN ORGANIZED HEALTH CARE EDUCATION/TRAINING PROGRAM

## 2022-10-13 PROCEDURE — 82043 UR ALBUMIN QUANTITATIVE: CPT | Performed by: STUDENT IN AN ORGANIZED HEALTH CARE EDUCATION/TRAINING PROGRAM

## 2022-10-13 RX ORDER — INSULIN ASPART 100 [IU]/ML
5 INJECTION, SOLUTION INTRAVENOUS; SUBCUTANEOUS
Qty: 15 ML | Refills: 3 | Status: SHIPPED | OUTPATIENT
Start: 2022-10-13 | End: 2022-10-20 | Stop reason: SDUPTHER

## 2022-10-13 RX ORDER — GABAPENTIN 600 MG/1
600 TABLET ORAL 3 TIMES DAILY PRN
Qty: 90 TABLET | Refills: 0 | Status: CANCELLED | OUTPATIENT
Start: 2022-10-13

## 2022-10-13 RX ORDER — INSULIN DEGLUDEC INJECTION 100 U/ML
34 INJECTION, SOLUTION SUBCUTANEOUS DAILY
Qty: 15 ML | Refills: 3 | Status: SHIPPED | OUTPATIENT
Start: 2022-10-13 | End: 2022-11-16

## 2022-10-13 RX ORDER — GABAPENTIN 300 MG/1
300 CAPSULE ORAL 3 TIMES DAILY
Qty: 90 CAPSULE | Refills: 2 | Status: SHIPPED | OUTPATIENT
Start: 2022-10-13 | End: 2023-03-30 | Stop reason: SDUPTHER

## 2022-10-13 RX ORDER — LISINOPRIL 20 MG/1
20 TABLET ORAL DAILY
Qty: 90 TABLET | Refills: 3 | Status: SHIPPED | OUTPATIENT
Start: 2022-10-13 | End: 2022-11-23

## 2022-10-13 NOTE — PROGRESS NOTES
Chief Complaint  Hypertension, Diabetes (Discuss changing insulin to sliding scale), Med Refill, Sleep Apnea (Wanting sleep study test), and Anxiety    Subjective          Carmelina Saldana presents to [facility]  History of Present Illness    Reports that since last visit, vision has been a big problem.  Had surgery on her left eye two weeks ago, yesterday.    Reports that she has been using a sliding scale that she got from her 's hospitalization.  Sliding scale is as follows, and given before meals in addition to 5 units of short acting insulin:    B.s.150-199, 3U  B.s. 200-249, 5U  B.s 250-299, 8U  B.s 300-349, 10U  B.s 350-400, 12 U  B.s 400 or more, call ER     I have reviewed her glucometer today, and found the following:    Last 7 days, average glucose is 160  1.3 checks per day during last 7 days    During last 30 days, 2.4 checks/day  With High 309, Low 64, Average 178    Due to her visual limitations now, her  reads her glucometer for her, and helps her with her insulin.    She reports she has been compliant with her 34 units tresiba.    She reports that she has quite substantial tingling and cramping of her bilateral legs.  Trial of gabapentin was quite helpful prior to her being lost to follow up, but reports that she sometimes would take her 600 mg daily gabapentin as often as three times a day.    She reports that she and her  (who is not present at clinic today) have concerns about sleep apnea, and she is requesting a sleep referral.  Per her report, her eye doctor was also concerned about sleep apnea due to problems that occurred during her eye surgery.    STOP BANG    1. Do you snore loudly?  yes    2.   Do you often feel tired, fatigued, or sleepy during the daytime?  yes    3.   Has anyone observed you stop breathing in your sleep?  yes    4.   Do you have (or are you being treated for) high blood pressure?  yes    5.   BMI  Final Anesthesia Post-op Assessment    Patient: Tom Leo  Procedure(s) Performed: XI ROBOTIC ASSISTED LAPAROSCOPIC LEFT NEPHROURETERECTOMY  Anesthesia type: General    Visit Vitals  /71 (BP Location: LUE - Left upper extremity, Patient Position: Sitting)   Pulse 96   Temp 37 °C (98.6 °F) (Oral)   Resp 16   Ht 5' 10\" (1.778 m)   Wt 87.7 kg (193 lb 5.5 oz)   SpO2 93%   BMI 27.74 kg/m²          pt resting comfortably no anesthetic complications noted    No complications documented.    >35?  yes    6.   Age >50?  yes    7.   Neck Circumference >40 cm/shirt collar 16neck  inch (if male) or > 40 cm ?  yes    8.   Gender = Male?  no    Score: 7 (high risk is a score of 5 or more)      She also reports she is struggling with depression, anxiety, as well as panic attacks.    PHQ-9 Depression Screening  Little interest or pleasure in doing things? 2-->more than half the days   Feeling down, depressed, or hopeless? 3-->nearly every day   Trouble falling or staying asleep, or sleeping too much? 3-->nearly every day   Feeling tired or having little energy? 3-->nearly every day   Poor appetite or overeating? 0-->not at all   Feeling bad about yourself - or that you are a failure or have let yourself or your family down? 3-->nearly every day   Trouble concentrating on things, such as reading the newspaper or watching television? 3-->nearly every day   Moving or speaking so slowly that other people could have noticed? Or the opposite - being so fidgety or restless that you have been moving around a lot more than usual? 2-->more than half the days   Thoughts that you would be better off dead, or of hurting yourself in some way? 0-->not at all   PHQ-9 Total Score 19   If you checked off any problems, how difficult have these problems made it for you to do your work, take care of things at home, or get along with other people? somewhat difficult           Current Outpatient Medications   Medication Instructions   • atorvastatin (LIPITOR) 10 mg, Oral, Daily   • DULoxetine (CYMBALTA) 60 mg, Oral, Daily   • Eliquis 5 mg, Oral, 2 Times Daily   • gabapentin (NEURONTIN) 300 mg, Oral, 3 Times Daily   • levothyroxine (SYNTHROID, LEVOTHROID) 175 mcg, Oral, Daily   • lisinopril (PRINIVIL,ZESTRIL) 20 mg, Oral, Daily   • NovoLOG FlexPen 5 Units, Subcutaneous, 3 Times Daily Before Meals   • pantoprazole (PROTONIX) 40 mg, Oral, Daily   • rOPINIRole (REQUIP) 0.25 mg, Oral, Nightly   • sertraline (ZOLOFT) 50 mg, Oral, Daily   •  "Tresiba FlexTouch 34 Units, Subcutaneous, Daily       The following portions of the patient's history were reviewed and updated as appropriate: allergies, current medications, past family history, past medical history, past social history, past surgical history, and problem list.    Objective   Vital Signs:   /91 (BP Location: Right arm, Patient Position: Sitting)   Pulse 93   Temp 98.4 °F (36.9 °C) (Temporal)   Ht 162.6 cm (64\")   Wt 114 kg (251 lb 12.8 oz)   SpO2 98%   BMI 43.22 kg/m²     Wt Readings from Last 3 Encounters:   10/13/22 114 kg (251 lb 12.8 oz)   07/12/21 107 kg (235 lb)   06/11/21 108 kg (238 lb 11.2 oz)     BP Readings from Last 3 Encounters:   10/13/22 133/91   07/12/21 157/87   06/04/21 138/72     Physical Exam  Vitals reviewed.   Constitutional:       General: She is not in acute distress.     Appearance: Normal appearance. She is not ill-appearing, toxic-appearing or diaphoretic.   HENT:      Head: Normocephalic and atraumatic.   Eyes:      Comments: Conjunctival injection, left eye.  Right eye conjunctiva normal in appearance   Cardiovascular:      Rate and Rhythm: Normal rate and regular rhythm.      Pulses: Normal pulses.      Heart sounds: Normal heart sounds. No murmur heard.    No friction rub. No gallop.   Pulmonary:      Effort: Pulmonary effort is normal. No respiratory distress.      Breath sounds: Normal breath sounds. No stridor. No wheezing, rhonchi or rales.   Chest:      Chest wall: No tenderness.   Abdominal:      General: Abdomen is flat.      Palpations: Abdomen is soft. There is no mass.      Tenderness: There is no abdominal tenderness.   Musculoskeletal:      Right lower leg: No edema.      Left lower leg: No edema.        Feet:    Skin:     General: Skin is warm and dry.   Neurological:      General: No focal deficit present.      Mental Status: She is alert. Mental status is at baseline.   Psychiatric:         Behavior: Behavior normal.         Thought " Content: Thought content normal.         Judgment: Judgment normal.          Result Review :   The following data was reviewed by: Guy Beatty MD on 10/13/2022:           Lab Results   Component Value Date    COVID19 Not Detected 09/30/2021    INR 0.92 (L) 11/23/2020       Procedures        Assessment and Plan    Diagnoses and all orders for this visit:    1. Essential hypertension (Primary)  -     Comprehensive Metabolic Panel  -     lisinopril (PRINIVIL,ZESTRIL) 20 MG tablet; Take 1 tablet by mouth Daily.  Dispense: 90 tablet; Refill: 3    2. Postoperative hypothyroidism  -     TSH    3. Type 2 diabetes mellitus with diabetic neuropathy, with long-term current use of insulin (HCC)  -     Comprehensive Metabolic Panel  -     Hemoglobin A1c  -     Microalbumin / Creatinine Urine Ratio - Urine, Clean Catch  -     gabapentin (NEURONTIN) 300 MG capsule; Take 1 capsule by mouth 3 (Three) Times a Day.  Dispense: 90 capsule; Refill: 2  -     Tresiba FlexTouch 100 UNIT/ML solution pen-injector injection; Inject 34 Units under the skin into the appropriate area as directed Daily for 30 days.  Dispense: 10.2 mL; Refill: 3  -     insulin aspart (NovoLOG FlexPen) 100 UNIT/ML solution pen-injector sc pen; Inject 5 Units under the skin into the appropriate area as directed 3 (Three) Times a Day Before Meals.  Dispense: 15 mL; Refill: 3    4. Class 3 severe obesity due to excess calories without serious comorbidity with body mass index (BMI) of 40.0 to 44.9 in adult (HCC)  -     Ambulatory Referral to Sleep Medicine    5. Need for hepatitis C screening test  -     Hepatitis C Antibody    6. Other hyperlipidemia  -     Lipid Panel    7. Encounter for screening mammogram for malignant neoplasm of breast  -     Mammo Screening Digital Tomosynthesis Bilateral With CAD; Future    8. Medication management  -     POC Urine Drug Screen [facility] Bio-Cup    9. Annual physical exam  -     CBC & Differential    10. Severe obesity due to  excess calories (HCC)    11. Vitreous hemorrhage, left eye (HCC)    12. Encounter for immunization  -     FluLaval/Fluzone >6 mos (0388-3131)  -     Pneumococcal Conjugate Vaccine 20-Valent (PCV20)  -     COVID-19 Bivalent Booster (Pfizer) 12+yrs  -     Tdap Vaccine Greater Than or Equal To 8yo IM    13. Anxiety and depression  -     sertraline (Zoloft) 50 MG tablet; Take 1 tablet by mouth Daily.  Dispense: 30 tablet; Refill: 1      T2DM:  -I am agreeable to continuing her sliding scale regimen in addition to scheduled 5U short acting with meals and 34U of tresiba  -I counseled her today on the rule of 15 for treatment of hypoglycemic episodes (if glucose < 70, give 15 grams of carbs and re-check blood sugar in 15 minutes - repeat if blood sugar not at goal)    Anxiety and depression:  -counseled today on black box warning of SSRI for suicide risk, discussed the available evidence, and counseled that benefits outweigh the risks  -will trial zoloft in addition to cymbalta  -will RTC in one month for re-evaluation on zoloft    Health Maintenance:  -nutritional counseling on the components of a heart healthy diet  -exercise counseling, recommending at least 2.5 hours of moderate exercise weekly  -Discussed risks/benefits to vaccination, reviewed components of the vaccine, discussed VIS, discussed informed consent, informed consent obtained. Patient/Parent was allowed to accept or refuse vaccine. Questions answered to satisfactory state of patient/parent. We reviewed typical age appropriate and seasonally appropriate vaccinations. Reviewed immunization history and updated state vaccination form as needed. Patient/Parent was counseled on the above vaccines.      Medications Discontinued During This Encounter   Medication Reason   • erythromycin (ROMYCIN) 5 MG/GM ophthalmic ointment *Therapy completed   • gabapentin (NEURONTIN) 600 MG tablet *Re-Entry   • lisinopril (PRINIVIL,ZESTRIL) 10 MG tablet Reorder   • gabapentin  (NEURONTIN) 600 MG tablet    • neomycin-polymyxin-dexamethamethasone (POLYDEX) 3.5-38897-9.1 ointment ophthalmic ointment    • prednisoLONE acetate (PRED FORTE) 1 % ophthalmic suspension    • neomycin-polymyxin-dexamethamethasone (POLYDEX) 3.5-31749-3.1 ointment ophthalmic ointment    • insulin aspart (NovoLOG FlexPen) 100 UNIT/ML solution pen-injector sc pen Reorder   • Tresiba FlexTouch 100 UNIT/ML solution pen-injector injection Reorder          Follow Up   Return in about 1 month (around 11/13/2022) for depression.  Patient was given instructions and counseling regarding her condition or for health maintenance advice. Please see specific information pulled into the AVS if appropriate.       Guy Beatty MD  10/13/22  18:16 EDT

## 2022-10-13 NOTE — PATIENT INSTRUCTIONS
Cooking With Less Salt  Cooking with less salt is one way to reduce the amount of sodium you get from food. Sodium is one of the elements that make up salt. It is found naturally in foods and is also added to certain foods. Depending on your condition and overall health, your health care provider or dietitian may recommend that you reduce your sodium intake. Most people should have less than 2,300 milligrams (mg) of sodium each day. If you have high blood pressure (hypertension), you may need to limit your sodium to 1,500 mg each day. Follow the tips below to help reduce your sodium intake.  What are tips for eating less sodium?  Reading food labels       Check the food label before buying or using packaged ingredients. Always check the label for the serving size and sodium content.  Look for products with no more than 140 mg of sodium in one serving.  Check the % Daily Value column to see what percent of the daily recommended amount of sodium is provided in one serving of the product. Foods with 5% or less in this column are considered low in sodium. Foods with 20% or higher are considered high in sodium.  Do not choose foods with salt as one of the first three ingredients on the ingredients list. If salt is one of the first three ingredients, it usually means the item is high in sodium.     Shopping  Buy sodium-free or low-sodium products. Look for the following words on food labels:  Low-sodium.  Sodium-free.  Reduced-sodium.  No salt added.  Unsalted.  Always check the sodium content even if foods are labeled as low-sodium or no salt added.  Buy fresh foods.  Cooking  Use herbs, seasonings without salt, and spices as substitutes for salt.  Use sodium-free baking soda when baking.  [place], braise, or roast foods to add flavor with less salt.  Avoid adding salt to pasta, rice, or hot cereals.  Drain and rinse canned vegetables, beans, and meat before use.  Avoid adding salt when cooking sweets and desserts.  Cook  "with low-sodium ingredients.  What foods are high in sodium?  Vegetables  Regular canned vegetables (not low-sodium or reduced-sodium). Sauerkraut, pickled vegetables, and relishes. Olives. French fries. Onion rings. Regular canned tomato sauce and paste. Regular tomato and vegetable juice. Frozen vegetables in sauces.  Grains  Instant hot cereals. Bread stuffing, pancake, and biscuit mixes. Croutons. Seasoned rice or pasta mixes. Noodle soup cups. Boxed or frozen macaroni and cheese. Regular salted crackers. Self-rising flour. Rolls. Bagels. Flour tortillas and wraps.  Meats and other proteins  Meat or fish that is salted, canned, smoked, cured, spiced, or pickled. This includes nobles, ham, sausages, hot dogs, corned beef, chipped beef, meat loaves, salt pork, jerky, pickled herring, anchovies, regular canned tuna, and sardines. Salted nuts.  Dairy  Processed cheese and cheese spreads. Cheese curds. Blue cheese. Feta cheese. String cheese. Regular cottage cheese. Buttermilk. Canned milk.  The items listed above may not be a complete list of foods high in sodium. Actual amounts of sodium may be different depending on processing. Contact a dietitian for more information.  What foods are low in sodium?  Fruits  Fresh, frozen, or canned fruit with no sauce added. Fruit juice.  Vegetables  Fresh or frozen vegetables with no sauce added. \"No salt added\" canned vegetables. \"No salt added\" tomato sauce and paste. Low-sodium or reduced-sodium tomato and vegetable juice.  Grains  Noodles, pasta, quinoa, rice. Shredded or puffed wheat or puffed rice. Regular or quick oats (not instant). Low-sodium crackers. Low-sodium bread. Whole-grain bread and whole-grain pasta. Unsalted popcorn.  Meats and other proteins  Fresh or frozen whole meats, poultry (not injected with sodium), and fish with no sauce added. Unsalted nuts. Dried peas, beans, and lentils without added salt. Unsalted canned beans. Eggs. Unsalted nut butters. " Low-sodium canned tuna or chicken.  Dairy  Milk. Soy milk. Yogurt. Low-sodium cheeses, such as Swiss, [place], mozzarella, and ricotta. Sherbet or ice cream (keep to ½ cup per serving). Cream cheese.  Fats and oils  Unsalted butter or margarine.  Other foods  Homemade pudding. Sodium-free baking soda and baking powder. Herbs and spices. Low-sodium seasoning mixes.  Beverages  Coffee and tea. Carbonated beverages.  The items listed above may not be a complete list of foods low in sodium. Actual amounts of sodium may be different depending on processing. Contact a dietitian for more information.  What are some salt alternatives when cooking?  The following are herbs, seasonings, and spices that can be used instead of salt to flavor your food. Herbs should be fresh or dried. Do not choose packaged mixes. Next to the name of the herb, spice, or seasoning are some examples of foods you can pair it with.  Herbs  Bay leaves - Soups, meat and vegetable dishes, and spaghetti sauce.  Basil - Italian dishes, soups, pasta, and fish dishes.  Cilantro - Meat, poultry, and vegetable dishes.  Chili powder - Marinades and Mexican dishes.  Chives - Salad dressings and potato dishes.  Cumin - Mexican dishes, couscous, and meat dishes.  Dill - Fish dishes, sauces, and salads.  Fennel - Meat and vegetable dishes, breads, and cookies.  Garlic (do not use garlic salt) - Italian dishes, meat dishes, salad dressings, and sauces.  Marjoram - Soups, potato dishes, and meat dishes.  Oregano - Pizza and spaghetti sauce.  Parsley - Salads, soups, pasta, and meat dishes.  Glenis - Italian dishes, salad dressings, soups, and red meats.  Saffron - Fish dishes, pasta, and some poultry dishes.  Kb - Stuffings and sauces.  Tarragon - Fish and poultry dishes.  Thyme - Stuffing, meat, and fish dishes.  Seasonings  Lemon juice - Fish dishes, poultry dishes, vegetables, and salads.  Vinegar - Salad dressings, vegetables, and fish  "dishes.  Spices  Cinnamon - Sweet dishes, such as cakes, cookies, and puddings.  Cloves - Gingerbread, puddings, and marinades for meats.  Howell - Vegetable dishes, fish and poultry dishes, and stir-dasilva dishes.  Deisi - Vegetable dishes, fish dishes, and stir-dasilva dishes.  Nutmeg - Pasta, vegetables, poultry, fish dishes, and custard.  Summary  Cooking with less salt is one way to reduce the amount of sodium that you get from food.  Buy sodium-free or low-sodium products.  Check the food label before using or buying packaged ingredients.  Use herbs, seasonings without salt, and spices as substitutes for salt in foods.  This information is not intended to replace advice given to you by your health care provider. Make sure you discuss any questions you have with your health care provider.  Document Revised: 12/09/2020 Document Reviewed: 12/09/2020  [employer] Patient Education © 2021 [employer].      https://www.nhlbi.nih.gov/files/docs/public/heart/dash_brief.pdf\">   DASH Eating Plan  DASH stands for Dietary Approaches to Stop Hypertension. The DASH eating plan is a healthy eating plan that has been shown to:  Reduce high blood pressure (hypertension).  Reduce your risk for type 2 diabetes, heart disease, and stroke.  Help with weight loss.  What are tips for following this plan?  Reading food labels  Check food labels for the amount of salt (sodium) per serving. Choose foods with less than 5 percent of the Daily Value of sodium. Generally, foods with less than 300 milligrams (mg) of sodium per serving fit into this eating plan.  To find whole grains, look for the word \"whole\" as the first word in the ingredient list.  Shopping  Buy products labeled as \"low-sodium\" or \"no salt added.\"  Buy fresh foods. Avoid canned foods and pre-made or frozen meals.  Cooking  Avoid adding salt when cooking. Use salt-free seasonings or herbs instead of table salt or sea salt. Check with your health care provider or pharmacist before " using salt substitutes.  Do not dasilva foods. Cook foods using healthy methods such as baking, boiling, grilling, roasting, and broiling instead.  Cook with heart-healthy oils, such as olive, canola, avocado, soybean, or sunflower oil.  Meal planning       Eat a balanced diet that includes:  4 or more servings of fruits and 4 or more servings of vegetables each day. Try to fill one-half of your plate with fruits and vegetables.  6-8 servings of whole grains each day.  Less than 6 oz (170 g) of lean meat, poultry, or fish each day. A 3-oz (85-g) serving of meat is about the same size as a deck of cards. One egg equals 1 oz (28 g).  2-3 servings of low-fat dairy each day. One serving is 1 cup (237 mL).  1 serving of nuts, seeds, or beans 5 times each week.  2-3 servings of heart-healthy fats. Healthy fats called omega-3 fatty acids are found in foods such as walnuts, flaxseeds, fortified milks, and eggs. These fats are also found in cold-water fish, such as sardines, salmon, and mackerel.  Limit how much you eat of:  Canned or prepackaged foods.  Food that is high in trans fat, such as some fried foods.  Food that is high in saturated fat, such as fatty meat.  Desserts and other sweets, sugary drinks, and other foods with added sugar.  Full-fat dairy products.  Do not salt foods before eating.  Do not eat more than 4 egg yolks a week.  Try to eat at least 2 vegetarian meals a week.  Eat more home-cooked food and less restaurant, buffet, and fast food.     Lifestyle  When eating at a restaurant, ask that your food be prepared with less salt or no salt, if possible.  If you drink alcohol:  Limit how much you use to:  0-1 drink a day for women who are not pregnant.  0-2 drinks a day for men.  Be aware of how much alcohol is in your drink. In the [place] one drink equals one 12 oz bottle of beer (355 mL), one 5 oz glass of wine (148 mL), or one 1½ oz glass of hard liquor (44 mL).  General information  Avoid eating more than  2,300 mg of salt a day. If you have hypertension, you may need to reduce your sodium intake to 1,500 mg a day.  Work with your health care provider to maintain a healthy body weight or to lose weight. Ask what an ideal weight is for you.  Get at least 30 minutes of exercise that causes your heart to beat faster (aerobic exercise) most days of the week. Activities may include walking, swimming, or biking.  Work with your health care provider or dietitian to adjust your eating plan to your individual calorie needs.  What foods should I eat?  Fruits  All fresh, dried, or frozen fruit. Canned fruit in natural juice (without added sugar).  Vegetables  Fresh or frozen vegetables (raw, steamed, roasted, or grilled). Low-sodium or reduced-sodium tomato and vegetable juice. Low-sodium or reduced-sodium tomato sauce and tomato paste. Low-sodium or reduced-sodium canned vegetables.  Grains  Whole-grain or whole-wheat bread. Whole-grain or whole-wheat pasta. Brown rice. Oatmeal. Quinoa. Bulgur. Whole-grain and low-sodium cereals. Radha bread. Low-fat, low-sodium crackers. Whole-wheat flour tortillas.  Meats and other proteins  Skinless chicken or turkey. Ground chicken or turkey. Pork with fat trimmed off. Fish and seafood. Egg whites. Dried beans, peas, or lentils. Unsalted nuts, nut butters, and seeds. Unsalted canned beans. Lean cuts of beef with fat trimmed off. Low-sodium, lean precooked or cured meat, such as sausages or meat loaves.  Dairy  Low-fat (1%) or fat-free (skim) milk. Reduced-fat, low-fat, or fat-free cheeses. Nonfat, low-sodium ricotta or cottage cheese. Low-fat or nonfat yogurt. Low-fat, low-sodium cheese.  Fats and oils  Soft margarine without trans fats. Vegetable oil. Reduced-fat, low-fat, or light mayonnaise and salad dressings (reduced-sodium). Canola, safflower, olive, avocado, soybean, and sunflower oils. Avocado.  Seasonings and condiments  Herbs. Spices. Seasoning mixes without salt.  Other  foods  Unsalted popcorn and pretzels. Fat-free sweets.  The items listed above may not be a complete list of foods and beverages you can eat. Contact a dietitian for more information.  What foods should I avoid?  Fruits  Canned fruit in a light or heavy syrup. Fried fruit. Fruit in cream or butter sauce.  Vegetables  Creamed or fried vegetables. Vegetables in a cheese sauce. Regular canned vegetables (not low-sodium or reduced-sodium). Regular canned tomato sauce and paste (not low-sodium or reduced-sodium). Regular tomato and vegetable juice (not low-sodium or reduced-sodium). Pickles. Olives.  Grains  Baked goods made with fat, such as croissants, muffins, or some breads. Dry pasta or rice meal packs.  Meats and other proteins  Fatty cuts of meat. Ribs. Fried meat. [place]aird. Bologna, salami, and other precooked or cured meats, such as sausages or meat loaves. Fat from the back of a pig (fatback). Bratwurst. Salted nuts and seeds. Canned beans with added salt. Canned or smoked fish. Whole eggs or egg yolks. Chicken or turkey with skin.  Dairy  Whole or 2% milk, cream, and half-and-half. Whole or full-fat cream cheese. Whole-fat or sweetened yogurt. Full-fat cheese. Nondairy creamers. Whipped toppings. Processed cheese and cheese spreads.  Fats and oils  Butter. Stick margarine. Lard. Shortening. Ghee. Baird fat. Tropical oils, such as coconut, palm kernel, or palm oil.  Seasonings and condiments  Onion salt, garlic salt, seasoned salt, table salt, and sea salt. Worcestershire sauce. Tartar sauce. Barbecue sauce. Teriyaki sauce. Soy sauce, including reduced-sodium. Steak sauce. Canned and packaged gravies. Fish sauce. Oyster sauce. Cocktail sauce. Store-bought horseradish. Ketchup. Mustard. Meat flavorings and tenderizers. Bouillon cubes. Hot sauces. Pre-made or packaged marinades. Pre-made or packaged taco seasonings. Relishes. Regular salad dressings.  Other foods  Salted popcorn and pretzels.  The items listed above  may not be a complete list of foods and beverages you should avoid. Contact a dietitian for more information.  Where to find more information  National Heart, Lung, and Blood [place]: www.nhlbi.nih.gov  American Heart Association: www.heart.org  Academy of Nutrition and Dietetics: www.eatright.org  National Kidney Foundation: www.kidney.org  Summary  The DASH eating plan is a healthy eating plan that has been shown to reduce high blood pressure (hypertension). It may also reduce your risk for type 2 diabetes, heart disease, and stroke.  When on the DASH eating plan, aim to eat more fresh fruits and vegetables, whole grains, lean proteins, low-fat dairy, and heart-healthy fats.  With the DASH eating plan, you should limit salt (sodium) intake to 2,300 mg a day. If you have hypertension, you may need to reduce your sodium intake to 1,500 mg a day.  Work with your health care provider or dietitian to adjust your eating plan to your individual calorie needs.  This information is not intended to replace advice given to you by your health care provider. Make sure you discuss any questions you have with your health care provider.  Document Revised: 11/20/2020 Document Reviewed: 11/20/2020  [employer] Patient Education © 2021 [employer].       Heart-Healthy Eating Plan  Heart-healthy meal planning includes:  Eating less unhealthy fats.  Eating more healthy fats.  Making other changes in your diet.  Talk with your doctor or a diet specialist (dietitian) to create an eating plan that is right for you.  What is my plan?  Your doctor may recommend an eating plan that includes:  Total fat: ______% or less of total calories a day.  Saturated fat: ______% or less of total calories a day.  Cholesterol: less than _________mg a day.  What are tips for following this plan?  Cooking  Avoid frying your food. Try to bake, boil, grill, or broil it instead. You can also reduce fat by:  Removing the skin from poultry.  Removing all  visible fats from meats.  Steaming vegetables in water or broth.  Meal planning       At meals, divide your plate into four equal parts:  Fill one-half of your plate with vegetables and green salads.  Fill one-fourth of your plate with whole grains.  Fill one-fourth of your plate with lean protein foods.  Eat 4-5 servings of vegetables per day. A serving of vegetables is:  1 cup of raw or cooked vegetables.  2 cups of raw leafy greens.  Eat 4-5 servings of fruit per day. A serving of fruit is:  1 medium whole fruit.  ¼ cup of dried fruit.  ½ cup of fresh, frozen, or canned fruit.  ½ cup of 100% fruit juice.  Eat more foods that have soluble fiber. These are apples, broccoli, carrots, beans, peas, and barley. Try to get 20-30 g of fiber per day.  Eat 4-5 servings of nuts, legumes, and seeds per week:  1 serving of dried beans or legumes equals ½ cup after being cooked.  1 serving of nuts is ¼ cup.  1 serving of seeds equals 1 tablespoon.     General information  Eat more home-cooked food. Eat less restaurant, buffet, and fast food.  Limit or avoid alcohol.  Limit foods that are high in starch and sugar.  Avoid fried foods.  Lose weight if you are overweight.  Keep track of how much salt (sodium) you eat. This is important if you have high blood pressure. Ask your doctor to tell you more about this.  Try to add vegetarian meals each week.  Fats  Choose healthy fats. These include olive oil and canola oil, flaxseeds, walnuts, almonds, and seeds.  Eat more omega-3 fats. These include salmon, mackerel, sardines, tuna, flaxseed oil, and ground flaxseeds. Try to eat fish at least 2 times each week.  Check food labels. Avoid foods with trans fats or high amounts of saturated fat.  Limit saturated fats.  These are often found in animal products, such as meats, butter, and cream.  These are also found in plant foods, such as palm oil, palm kernel oil, and coconut oil.  Avoid foods with partially hydrogenated oils in them.  These have trans fats. Examples are stick margarine, some tub margarines, cookies, crackers, and other baked goods.  What foods can I eat?  Fruits  All fresh, canned (in natural juice), or frozen fruits.  Vegetables  Fresh or frozen vegetables (raw, steamed, roasted, or grilled). Green salads.  Grains  Most grains. Choose whole wheat and whole grains most of the time. Rice and pasta, including brown rice and pastas made with whole wheat.  Meats and other proteins  Lean, well-trimmed beef, veal, pork, and lamb. Chicken and turkey without skin. All fish and shellfish. Wild duck, rabbit, pheasant, and venison. Egg whites or low-cholesterol egg substitutes. Dried beans, peas, lentils, and tofu. Seeds and most nuts.  Dairy  Low-fat or nonfat cheeses, including ricotta and mozzarella. Skim or 1% milk that is liquid, powdered, or evaporated. Buttermilk that is made with low-fat milk. Nonfat or low-fat yogurt.  Fats and oils  Non-hydrogenated (trans-free) margarines. Vegetable oils, including soybean, sesame, sunflower, olive, peanut, safflower, corn, canola, and cottonseed. Salad dressings or mayonnaise made with a vegetable oil.  Beverages  Mineral water. Coffee and tea. Diet carbonated beverages.  Sweets and desserts  Sherbet, gelatin, and fruit ice. Small amounts of dark chocolate.  Limit all sweets and desserts.  Seasonings and condiments  All seasonings and condiments.  The items listed above may not be a complete list of foods and drinks you can eat. Contact a dietitian for more options.  What foods should I avoid?  Fruits  Canned fruit in heavy syrup. Fruit in cream or butter sauce. Fried fruit. Limit coconut.  Vegetables  Vegetables cooked in cheese, cream, or butter sauce. Fried vegetables.  Grains  Breads that are made with saturated or trans fats, oils, or whole milk. Croissants. Sweet rolls. Donuts. High-fat crackers, such as cheese crackers.  Meats and other proteins  Fatty meats, such as hot dogs, ribs,  sausage, nobles, rib-eye roast or steak. High-fat deli meats, such as salami and bologna. Caviar. Domestic duck and goose. Organ meats, such as liver.  Dairy  Cream, sour cream, cream cheese, and creamed cottage cheese. Whole-milk cheeses. Whole or 2% milk that is liquid, evaporated, or condensed. Whole buttermilk. Cream sauce or high-fat cheese sauce. Yogurt that is made from whole milk.  Fats and oils  Meat fat, or shortening. Cocoa butter, hydrogenated oils, palm oil, coconut oil, palm kernel oil. Solid fats and shortenings, including nobles fat, salt pork, lard, and butter. Nondairy cream substitutes. Salad dressings with cheese or sour cream.  Beverages  Regular sodas and juice drinks with added sugar.  Sweets and desserts  Frosting. Pudding. Cookies. Cakes. Pies. Milk chocolate or white chocolate. Buttered syrups. Full-fat ice cream or ice cream drinks.  The items listed above may not be a complete list of foods and drinks to avoid. Contact a dietitian for more information.  Summary  Heart-healthy meal planning includes eating less unhealthy fats, eating more healthy fats, and making other changes in your diet.  Eat a balanced diet. This includes fruits and vegetables, low-fat or nonfat dairy, lean protein, nuts and legumes, whole grains, and heart-healthy oils and fats.  This information is not intended to replace advice given to you by your health care provider. Make sure you discuss any questions you have with your health care provider.  Document Revised: 02/21/2019 Document Reviewed: 01/25/2019  [employer]vier Patient Education © 2021 [employer] Inc.       Mediterranean Diet  A Mediterranean diet refers to food and lifestyle choices that are based on the traditions of countries located on the [place]. This way of eating has been shown to help prevent certain conditions and improve outcomes for people who have chronic diseases, like kidney disease and heart disease.  What are tips for following this  plan?  Lifestyle  Cook and eat meals together with your family, when possible.  Drink enough fluid to keep your urine clear or pale yellow.  Be physically active every day. This includes:  Aerobic exercise like running or swimming.  Leisure activities like gardening, walking, or housework.  Get 7-8 hours of sleep each night.  If recommended by your health care provider, drink red wine in moderation. This means 1 glass a day for nonpregnant women and 2 glasses a day for men. A glass of wine equals 5 oz (150 mL).  Reading food labels       Check the serving size of packaged foods. For foods such as rice and pasta, the serving size refers to the amount of cooked product, not dry.  Check the total fat in packaged foods. Avoid foods that have saturated fat or trans fats.  Check the ingredients list for added sugars, such as corn syrup.     Shopping  At the grocery store, buy most of your food from the areas near the walls of the store. This includes:  Fresh fruits and vegetables (produce).  Grains, beans, nuts, and seeds. Some of these may be available in unpackaged forms or large amounts (in bulk).  Fresh seafood.  Poultry and eggs.  Low-fat dairy products.  Buy whole ingredients instead of prepackaged foods.  Buy fresh fruits and vegetables in-season from local farmers markets.  Buy frozen fruits and vegetables in resealable bags.  If you do not have access to quality fresh seafood, buy precooked frozen shrimp or canned fish, such as tuna, salmon, or sardines.  Buy small amounts of raw or cooked vegetables, salads, or olives from the deli or salad bar at your store.  Stock your pantry so you always have certain foods on hand, such as olive oil, canned tuna, canned tomatoes, rice, pasta, and beans.  Cooking  Cook foods with extra-virgin olive oil instead of using butter or other vegetable oils.  Have meat as a side dish, and have vegetables or grains as your main dish. This means having meat in small portions or adding  small amounts of meat to foods like pasta or stew.  Use beans or vegetables instead of meat in common dishes like chili or lasagna.  [place] with different cooking methods. Try roasting or broiling vegetables instead of steaming or sautéeing them.  Add frozen vegetables to soups, stews, pasta, or rice.  Add nuts or seeds for added healthy fat at each meal. You can add these to yogurt, salads, or vegetable dishes.  Marinate fish or vegetables using olive oil, lemon juice, garlic, and fresh herbs.  Meal planning       Plan to eat 1 vegetarian meal one day each week. Try to work up to 2 vegetarian meals, if possible.  Eat seafood 2 or more times a week.  Have healthy snacks readily available, such as:  Vegetable sticks with hummus.  Greek yogurt.  Fruit and nut trail mix.  Eat balanced meals throughout the week. This includes:  Fruit: 2-3 servings a day  Vegetables: 4-5 servings a day  Low-fat dairy: 2 servings a day  Fish, poultry, or lean meat: 1 serving a day  Beans and legumes: 2 or more servings a week  Nuts and seeds: 1-2 servings a day  Whole grains: 6-8 servings a day  Extra-virgin olive oil: 3-4 servings a day  Limit red meat and sweets to only a few servings a month     What are my food choices?  Mediterranean diet  Recommended  Grains: Whole-grain pasta. Brown rice. Bulgar wheat. Polenta. Couscous. Whole-wheat bread. Oatmeal. Quinoa.  Vegetables: Artichokes. Beets. Broccoli. Cabbage. Carrots. Eggplant. Green beans. Chard. Kale. Spinach. Onions. Leeks. Peas. Squash. Tomatoes. Peppers. Radishes.  Fruits: Apples. Apricots. Avocado. Berries. Bananas. Cherries. Dates. Figs. Grapes. [place]. Melon. Oranges. Peaches. Plums. Pomegranate.  Meats and other protein foods: Beans. Almonds. Sunflower seeds. Pine nuts. Peanuts. Cod. [place]. Scallops. Shrimp. Tuna. Tilapia. Clams. Oysters. Eggs.  Dairy: Low-fat milk. Cheese. Greek yogurt.  Beverages: Water. Red wine. Herbal tea.  Fats and oils: Extra virgin olive oil.  Avocado oil. Grape seed oil.  Sweets and desserts: Greek yogurt with honey. Baked apples. Poached pears. Trail mix.  Seasoning and other foods: Basil. Cilantro. Coriander. Cumin. Mint. Parsley. Kb. Rosemary. Tarragon. Garlic. Oregano. Thyme. Pepper. Balsalmic vinegar. Tahini. Hummus. Tomato sauce. Olives. Mushrooms.  Limit these  Grains: Prepackaged pasta or rice dishes. Prepackaged cereal with added sugar.  Vegetables: Deep fried potatoes (french fries).  Fruits: Fruit canned in syrup.  Meats and other protein foods: Beef. Pork. Lamb. Poultry with skin. Hot dogs. [place].  Dairy: Ice cream. Sour cream. Whole milk.  Beverages: Juice. Sugar-sweetened soft drinks. Beer. Liquor and spirits.  Fats and oils: Butter. Canola oil. Vegetable oil. Beef fat (tallow). Lard.  Sweets and desserts: Cookies. Cakes. Pies. Candy.  Seasoning and other foods: Mayonnaise. Premade sauces and marinades.  The items listed may not be a complete list. Talk with your dietitian about what dietary choices are right for you.  Summary  The Mediterranean diet includes both food and lifestyle choices.  Eat a variety of fresh fruits and vegetables, beans, nuts, seeds, and whole grains.  Limit the amount of red meat and sweets that you eat.  Talk with your health care provider about whether it is safe for you to drink red wine in moderation. This means 1 glass a day for nonpregnant women and 2 glasses a day for men. A glass of wine equals 5 oz (150 mL).  This information is not intended to replace advice given to you by your health care provider. Make sure you discuss any questions you have with your health care provider.  Document Revised: 08/17/2017 Document Reviewed: 08/10/2017  Elsevier Patient Education © 2020 Elsevier Inc.         Exercising to Stay Healthy  To become healthy and stay healthy, it is recommended that you do moderate-intensity and vigorous-intensity exercise. You can tell that you are exercising at a moderate intensity if your  heart starts beating faster and you start breathing faster but can still hold a conversation. You can tell that you are exercising at a vigorous intensity if you are breathing much harder and faster and cannot hold a conversation while exercising.  Exercising regularly is important. It has many health benefits, such as:  Improving overall fitness, flexibility, and endurance.  Increasing bone density.  Helping with weight control.  Decreasing body fat.  Increasing muscle strength.  Reducing stress and tension.  Improving overall health.  How often should I exercise?  Choose an activity that you enjoy, and set realistic goals. Your health care provider can help you make an activity plan that works for you.  Exercise regularly as told by your health care provider. This may include:  Doing strength training two times a week, such as:  Lifting weights.  Using resistance bands.  Push-ups.  Sit-ups.  Yoga.  Doing a certain intensity of exercise for a given amount of time. Choose from these options:  A total of 150 minutes of moderate-intensity exercise every week.  A total of 75 minutes of vigorous-intensity exercise every week.  A mix of moderate-intensity and vigorous-intensity exercise every week.  Children, pregnant women, people who have not exercised regularly, people who are overweight, and older adults may need to talk with a health care provider about what activities are safe to do. If you have a medical condition, be sure to talk with your health care provider before you start a new exercise program.  What are some exercise ideas?    Moderate-intensity exercise ideas include:  Walking 1 mile (1.6 km) in about 15 minutes.  Biking.  Hiking.  Golfing.  Dancing.  Water aerobics.  Vigorous-intensity exercise ideas include:  Walking 4.5 miles (7.2 km) or more in about 1 hour.  Jogging or running 5 miles (8 km) in about 1 hour.  Biking 10 miles (16.1 km) or more in about 1 hour.  Lap swimming.  Roller-skating or in-line  skating.  Cross-country skiing.  Vigorous competitive sports, such as football, basketball, and soccer.  Jumping rope.  Aerobic dancing.  What are some everyday activities that can help me to get exercise?  Yard work, such as:  Pushing a .  Raking and bagging leaves.  Washing your car.  Pushing a stroller.  Shoveling snow.  Gardening.  Washing windows or floors.  How can I be more active in my day-to-day activities?  Use stairs instead of an elevator.  Take a walk during your lunch break.  If you drive, park your car farther away from your work or school.  If you take public transportation, get off one stop early and walk the rest of the way.  Stand up or walk around during all of your indoor phone calls.  Get up, stretch, and walk around every 30 minutes throughout the day.  Enjoy exercise with a friend. Support to continue exercising will help you keep a regular routine of activity.  What guidelines can I follow while exercising?  Before you start a new exercise program, talk with your health care provider.  Do not exercise so much that you hurt yourself, feel dizzy, or get very short of breath.  Wear comfortable clothes and wear shoes with good support.  Drink plenty of water while you exercise to prevent dehydration or heat stroke.  Work out until your breathing and your heartbeat get faster.  Where to find more information  [place] Department of Health and Human Services: www.hhs.gov  Centers for Disease Control and Prevention (CDC): www.cdc.gov  Summary  Exercising regularly is important. It will improve your overall fitness, flexibility, and endurance.  Regular exercise also will improve your overall health. It can help you control your weight, reduce stress, and improve your bone density.  Do not exercise so much that you hurt yourself, feel dizzy, or get very short of breath.  Before you start a new exercise program, talk with your health care provider.  This information is not intended to replace  advice given to you by your health care provider. Make sure you discuss any questions you have with your health care provider.  Document Revised: 11/30/2018 Document Reviewed: 11/08/2018  Elsevier Patient Education © 2021 [employer]           Mindfulness-Based Stress Reduction  Mindfulness-based stress reduction (MBSR) is a program that helps people learn to practice mindfulness. Mindfulness is the practice of intentionally paying attention to the present moment. It can be learned and practiced through techniques such as education, breathing exercises, meditation, and yoga. MBSR includes several mindfulness techniques in one program.  MBSR works best when you understand the treatment, are willing to try new things, and can commit to spending time practicing what you learn. MBSR training may include learning about:  How your emotions, thoughts, and reactions affect your body.  New ways to respond to things that cause negative thoughts to start (triggers).  How to notice your thoughts and let go of them.  Practicing awareness of everyday things that you normally do without thinking.  The techniques and goals of different types of meditation.  What are the benefits of MBSR?  MBSR can have many benefits, which include helping you to:  Develop self-awareness. This refers to knowing and understanding yourself.  Learn skills and attitudes that help you to participate in your own health care.  Learn new ways to care for yourself.  Be more accepting about how things are, and let things go.  Be less judgmental and approach things with an open mind.  Be patient with yourself and trust yourself more.  MBSR has also been shown to:  Reduce negative emotions, such as depression and anxiety.  Improve memory and focus.  Change how you sense and approach pain.  Boost your body's ability to fight infections.  Help you connect better with other people.  Improve your sense of well-being.  Follow these instructions at home:       Find  a local in-person or online MBSR program.  Set aside some time regularly for mindfulness practice.  Find a mindfulness practice that works best for you. This may include one or more of the following:  Meditation. Meditation involves focusing your mind on a certain thought or activity.  Breathing awareness exercises. These help you to stay present by focusing on your breath.  Body scan. For this practice, you lie down and pay attention to each part of your body from head to toe. You can identify tension and soreness and intentionally relax parts of your body.  Yoga. Yoga involves stretching and breathing, and it can improve your ability to move and be flexible. It can also provide an experience of testing your body's limits, which can help you release stress.  Mindful eating. This way of eating involves focusing on the taste, texture, color, and smell of each bite of food. Because this slows down eating and helps you feel full sooner, it can be an important part of a weight-loss plan.  Find a podcast or recording that provides guidance for breathing awareness, body scan, or meditation exercises. You can listen to these any time when you have a free moment to rest without distractions.  Follow your treatment plan as told by your health care provider. This may include taking regular medicines and making changes to your diet or lifestyle as recommended.  How to practice mindfulness  To do a basic awareness exercise:  Find a comfortable place to sit.  Pay attention to the present moment. Observe your thoughts, feelings, and surroundings just as they are.  Avoid placing judgment on yourself, your feelings, or your surroundings. Make note of any judgment that comes up, and let it go.  Your mind may wander, and that is okay. Make note of when your thoughts drift, and return your attention to the present moment.  To do basic mindfulness meditation:  Find a comfortable place to sit. This may include a stable chair or a firm  floor cushion.  Sit upright with your back straight. Let your arms fall next to your side with your hands resting on your legs.  If sitting in a chair, rest your feet flat on the floor.  If sitting on a cushion, cross your legs in front of you.  Keep your head in a neutral position with your chin dropped slightly. Relax your jaw and rest the tip of your tongue on the roof of your mouth. Drop your gaze to the floor. You can close your eyes if you like.  Breathe normally and pay attention to your breath. Feel the air moving in and out of your nose. Feel your belly expanding and relaxing with each breath.  Your mind may wander, and that is okay. Make note of when your thoughts drift, and return your attention to your breath.  Avoid placing judgment on yourself, your feelings, or your surroundings. Make note of any judgment or feelings that come up, let them go, and bring your attention back to your breath.  When you are ready, lift your gaze or open your eyes. Pay attention to how your body feels after the meditation.  Where to find more information  You can find more information about MBSR from:  Your health care provider.  Community-based meditation centers or programs.  Programs offered near you.  Summary  Mindfulness-based stress reduction (MBSR) is a program that teaches you how to intentionally pay attention to the present moment. It is used with other treatments to help you cope better with daily stress, emotions, and pain.  MBSR focuses on developing self-awareness, which allows you to respond to life stress without judgment or negative emotions.  MBSR programs may involve learning different mindfulness practices, such as breathing exercises, meditation, yoga, body scan, or mindful eating. Find a mindfulness practice that works best for you, and set aside time for it on a regular basis.  This information is not intended to replace advice given to you by your health care provider. Make sure you discuss any  questions you have with your health care provider.  Document Revised: 02/22/2021 Document Reviewed: 04/26/2018  Elsevier Patient Education © 2021 Elsevier Inc.

## 2022-10-14 DIAGNOSIS — E03.8 OTHER SPECIFIED HYPOTHYROIDISM: ICD-10-CM

## 2022-10-14 DIAGNOSIS — E11.40 TYPE 2 DIABETES MELLITUS WITH DIABETIC NEUROPATHY, WITH LONG-TERM CURRENT USE OF INSULIN: Primary | ICD-10-CM

## 2022-10-14 DIAGNOSIS — Z79.4 TYPE 2 DIABETES MELLITUS WITH DIABETIC NEUROPATHY, WITH LONG-TERM CURRENT USE OF INSULIN: Primary | ICD-10-CM

## 2022-10-14 LAB
ALBUMIN SERPL-MCNC: 4 G/DL (ref 3.5–5.2)
ALBUMIN UR-MCNC: 1.6 MG/DL
ALBUMIN/GLOB SERPL: 1.3 G/DL
ALP SERPL-CCNC: 108 U/L (ref 39–117)
ALT SERPL W P-5'-P-CCNC: 21 U/L (ref 1–33)
ANION GAP SERPL CALCULATED.3IONS-SCNC: 9.4 MMOL/L (ref 5–15)
AST SERPL-CCNC: 13 U/L (ref 1–32)
BASOPHILS # BLD AUTO: 0.04 10*3/MM3 (ref 0–0.2)
BASOPHILS NFR BLD AUTO: 0.6 % (ref 0–1.5)
BILIRUB SERPL-MCNC: 0.5 MG/DL (ref 0–1.2)
BUN SERPL-MCNC: 13 MG/DL (ref 6–20)
BUN/CREAT SERPL: 12.6 (ref 7–25)
CALCIUM SPEC-SCNC: 9.5 MG/DL (ref 8.6–10.5)
CHLORIDE SERPL-SCNC: 103 MMOL/L (ref 98–107)
CHOLEST SERPL-MCNC: 136 MG/DL (ref 0–200)
CO2 SERPL-SCNC: 28.6 MMOL/L (ref 22–29)
CREAT SERPL-MCNC: 1.03 MG/DL (ref 0.57–1)
CREAT UR-MCNC: 95.7 MG/DL
DEPRECATED RDW RBC AUTO: 43.8 FL (ref 37–54)
EGFRCR SERPLBLD CKD-EPI 2021: 65.2 ML/MIN/1.73
EOSINOPHIL # BLD AUTO: 0.13 10*3/MM3 (ref 0–0.4)
EOSINOPHIL NFR BLD AUTO: 1.9 % (ref 0.3–6.2)
ERYTHROCYTE [DISTWIDTH] IN BLOOD BY AUTOMATED COUNT: 13 % (ref 12.3–15.4)
GLOBULIN UR ELPH-MCNC: 3 GM/DL
GLUCOSE SERPL-MCNC: 97 MG/DL (ref 65–99)
HCT VFR BLD AUTO: 43.1 % (ref 34–46.6)
HCV AB SER DONR QL: NORMAL
HDLC SERPL-MCNC: 53 MG/DL (ref 40–60)
HGB BLD-MCNC: 14.8 G/DL (ref 12–15.9)
IMM GRANULOCYTES # BLD AUTO: 0.02 10*3/MM3 (ref 0–0.05)
IMM GRANULOCYTES NFR BLD AUTO: 0.3 % (ref 0–0.5)
LDLC SERPL CALC-MCNC: 68 MG/DL (ref 0–100)
LDLC/HDLC SERPL: 1.27 {RATIO}
LYMPHOCYTES # BLD AUTO: 2.07 10*3/MM3 (ref 0.7–3.1)
LYMPHOCYTES NFR BLD AUTO: 29.6 % (ref 19.6–45.3)
MCH RBC QN AUTO: 32.1 PG (ref 26.6–33)
MCHC RBC AUTO-ENTMCNC: 34.3 G/DL (ref 31.5–35.7)
MCV RBC AUTO: 93.5 FL (ref 79–97)
MICROALBUMIN/CREAT UR: 16.7 MG/G
MONOCYTES # BLD AUTO: 0.39 10*3/MM3 (ref 0.1–0.9)
MONOCYTES NFR BLD AUTO: 5.6 % (ref 5–12)
NEUTROPHILS NFR BLD AUTO: 4.34 10*3/MM3 (ref 1.7–7)
NEUTROPHILS NFR BLD AUTO: 62 % (ref 42.7–76)
NRBC BLD AUTO-RTO: 0 /100 WBC (ref 0–0.2)
PLATELET # BLD AUTO: 266 10*3/MM3 (ref 140–450)
PMV BLD AUTO: 9.8 FL (ref 6–12)
POTASSIUM SERPL-SCNC: 4 MMOL/L (ref 3.5–5.2)
PROT SERPL-MCNC: 7 G/DL (ref 6–8.5)
RBC # BLD AUTO: 4.61 10*6/MM3 (ref 3.77–5.28)
SODIUM SERPL-SCNC: 141 MMOL/L (ref 136–145)
TRIGL SERPL-MCNC: 78 MG/DL (ref 0–150)
TSH SERPL DL<=0.05 MIU/L-ACNC: 0.78 UIU/ML (ref 0.27–4.2)
VLDLC SERPL-MCNC: 15 MG/DL (ref 5–40)
WBC NRBC COR # BLD: 6.99 10*3/MM3 (ref 3.4–10.8)

## 2022-10-14 RX ORDER — ROPINIROLE 0.25 MG/1
0.25 TABLET, FILM COATED ORAL NIGHTLY
Qty: 30 TABLET | Refills: 0 | Status: SHIPPED | OUTPATIENT
Start: 2022-10-14 | End: 2022-11-11 | Stop reason: SDUPTHER

## 2022-10-14 RX ORDER — METFORMIN HYDROCHLORIDE 500 MG/1
500 TABLET, EXTENDED RELEASE ORAL
Qty: 90 TABLET | Refills: 3 | Status: SHIPPED | OUTPATIENT
Start: 2022-10-14

## 2022-10-14 RX ORDER — LEVOTHYROXINE SODIUM 175 UG/1
175 TABLET ORAL DAILY
Qty: 30 TABLET | Refills: 5 | Status: SHIPPED | OUTPATIENT
Start: 2022-10-14 | End: 2023-04-06

## 2022-10-14 NOTE — TELEPHONE ENCOUNTER
Caller: Carmelina Saldana    Relationship: Self    Best call back number: [phone]  Requested Prescriptions:   Requested Prescriptions     Pending Prescriptions Disp Refills   • levothyroxine (SYNTHROID, LEVOTHROID) 175 MCG tablet 30 tablet 5     Sig: Take 1 tablet by mouth Daily.   • rOPINIRole (REQUIP) 0.25 MG tablet 30 tablet 0     Sig: Take 1 tablet by mouth Every Night.        Pharmacy where request should be sent: [facility]     Additional details provided by patient: PATIENT IS CURRENTLY OUT OF BOTH MEDICATIONS.     Does the patient have less than a 3 day supply:  [x] Yes  [] No    Juan A Martini Rep   10/14/22 16:40 EDT

## 2022-10-18 ENCOUNTER — APPOINTMENT (OUTPATIENT)
Dept: MAMMOGRAPHY | Facility: HOSPITAL | Age: 52
End: 2022-10-18

## 2022-10-19 ENCOUNTER — TELEPHONE (OUTPATIENT)
Dept: INTERNAL MEDICINE | Facility: CLINIC | Age: 52
End: 2022-10-19

## 2022-10-19 NOTE — TELEPHONE ENCOUNTER
Caller: Carmelina Saldana    Relationship: Self    Best call back number: [phone]    What is the best time to reach you:ANYTIME    Who are you requesting to speak with (clinical staff, provider,  specific staff member): CLINICAL    What was the call regarding: PATIENT CALLED AND STATED THAT THEY WERE SEEN IN THE OFFICE AND THEIR NOVOLOG WAS SUPPOSED TO BE CHANGED TO A SLIDING SCALE. PATIENT WOULD LIKE TO BE NOTIFIED WHEN THE CHANGES ARE MADE SO SHE CAN REFILL THE PRESCRIPTION.    Do you require a callback:YES

## 2022-10-20 DIAGNOSIS — Z79.4 TYPE 2 DIABETES MELLITUS WITH DIABETIC NEUROPATHY, WITH LONG-TERM CURRENT USE OF INSULIN: ICD-10-CM

## 2022-10-20 DIAGNOSIS — E11.40 TYPE 2 DIABETES MELLITUS WITH DIABETIC NEUROPATHY, WITH LONG-TERM CURRENT USE OF INSULIN: ICD-10-CM

## 2022-10-20 RX ORDER — INSULIN ASPART 100 [IU]/ML
INJECTION, SOLUTION INTRAVENOUS; SUBCUTANEOUS
Qty: 15 ML | Refills: 3 | Status: SHIPPED | OUTPATIENT
Start: 2022-10-20

## 2022-11-09 PROBLEM — H43.12 VITREOUS HEMORRHAGE, LEFT EYE (HCC): Status: ACTIVE | Noted: 2022-11-09

## 2022-11-11 RX ORDER — ROPINIROLE 0.25 MG/1
0.25 TABLET, FILM COATED ORAL NIGHTLY
Qty: 90 TABLET | Refills: 1 | Status: SHIPPED | OUTPATIENT
Start: 2022-11-11

## 2022-11-11 NOTE — TELEPHONE ENCOUNTER
LAST OV: 10/13/2022  NEXT OV: 11/16/2022  LAST UDS; 10/13/2022  CONTRACT: 10/13/2022  MEDICATION: REQUIP

## 2022-11-23 ENCOUNTER — OFFICE VISIT (OUTPATIENT)
Dept: INTERNAL MEDICINE | Facility: CLINIC | Age: 52
End: 2022-11-23

## 2022-11-23 VITALS
SYSTOLIC BLOOD PRESSURE: 184 MMHG | OXYGEN SATURATION: 96 % | TEMPERATURE: 97.8 F | DIASTOLIC BLOOD PRESSURE: 110 MMHG | BODY MASS INDEX: 44.32 KG/M2 | WEIGHT: 259.6 LBS | HEART RATE: 101 BPM | HEIGHT: 64 IN

## 2022-11-23 DIAGNOSIS — E03.8 OTHER SPECIFIED HYPOTHYROIDISM: ICD-10-CM

## 2022-11-23 DIAGNOSIS — E11.40 TYPE 2 DIABETES MELLITUS WITH DIABETIC NEUROPATHY, WITH LONG-TERM CURRENT USE OF INSULIN: ICD-10-CM

## 2022-11-23 DIAGNOSIS — I10 ESSENTIAL HYPERTENSION: ICD-10-CM

## 2022-11-23 DIAGNOSIS — I16.0 HYPERTENSIVE URGENCY: ICD-10-CM

## 2022-11-23 DIAGNOSIS — H54.8 LEGALLY BLIND: ICD-10-CM

## 2022-11-23 DIAGNOSIS — Z79.4 TYPE 2 DIABETES MELLITUS WITH DIABETIC NEUROPATHY, WITH LONG-TERM CURRENT USE OF INSULIN: ICD-10-CM

## 2022-11-23 DIAGNOSIS — F32.A ANXIETY AND DEPRESSION: Primary | ICD-10-CM

## 2022-11-23 DIAGNOSIS — F41.9 ANXIETY AND DEPRESSION: Primary | ICD-10-CM

## 2022-11-23 PROCEDURE — 99214 OFFICE O/P EST MOD 30 MIN: CPT | Performed by: STUDENT IN AN ORGANIZED HEALTH CARE EDUCATION/TRAINING PROGRAM

## 2022-11-23 RX ORDER — AMLODIPINE BESYLATE AND BENAZEPRIL HYDROCHLORIDE 10; 20 MG/1; MG/1
1 CAPSULE ORAL DAILY
Qty: 90 CAPSULE | Refills: 2 | Status: SHIPPED | OUTPATIENT
Start: 2022-11-23 | End: 2023-04-05

## 2022-11-23 RX ORDER — SERTRALINE HYDROCHLORIDE 25 MG/1
TABLET, FILM COATED ORAL
Qty: 90 TABLET | Refills: 2 | Status: SHIPPED | OUTPATIENT
Start: 2022-11-23

## 2022-11-23 RX ORDER — CHLORTHALIDONE 25 MG/1
25 TABLET ORAL DAILY
Qty: 90 TABLET | Refills: 2 | Status: SHIPPED | OUTPATIENT
Start: 2022-11-23

## 2022-11-23 NOTE — PROGRESS NOTES
Chief Complaint  Anxiety, Depression (Zoloft not helping much), and Hypertension    Subjective          Carmelina Saldana presents to [facility]  History of Present Illness    Legally Blind:    Diagnosed by ophtho in the interim.  Working on long-term disability.    Anxiety and Depression:    Reports mood is still poor.  Denies SI.    HTN:    Taking medicines.  Denies chest pain.    Current Outpatient Medications   Medication Instructions   • amLODIPine-benazepril (Lotrel) 10-20 MG per capsule 1 capsule, Oral, Daily   • atorvastatin (LIPITOR) 10 mg, Oral, Daily   • chlorthalidone (HYGROTON) 25 mg, Oral, Daily   • DULoxetine (CYMBALTA) 60 mg, Oral, Daily   • Eliquis 5 mg, Oral, 2 Times Daily   • gabapentin (NEURONTIN) 300 mg, Oral, 3 Times Daily   • insulin aspart (NovoLOG FlexPen) 100 UNIT/ML solution pen-injector sc pen Inject 5 units under the skin three times a day prior to meals, with additional sliding scale coverage: b.s. 150-199, 3 additional units, b.s. 200-249, 5U, b.s. 250-299, 8U, b.s. 300-349, 10U, b.s. 350-400, 12U (max of 60 units daily)   • insulin degludec (TRESIBA FLEXTOUCH) 34 Units, Subcutaneous, Daily   • ketorolac (ACULAR) 0.5 % ophthalmic solution instill 1 drop by ophthalmic route  every day in left eye   • levothyroxine (SYNTHROID, LEVOTHROID) 175 mcg, Oral, Daily   • metFORMIN ER (GLUCOPHAGE-XR) 500 mg, Oral, Daily With Breakfast   • pantoprazole (PROTONIX) 40 mg, Oral, Daily   • rOPINIRole (REQUIP) 0.25 mg, Oral, Nightly   • sertraline (Zoloft) 25 MG tablet Take 75 mg by mouth daily (one 25 mg and one 50 mg).   • sertraline (Zoloft) 50 MG tablet Take 75 mg by mouth daily (one 25 mg and one 50 mg).       The following portions of the patient's history were reviewed and updated as appropriate: allergies, current medications, past family history, past medical history, past social history, past surgical history, and problem list.    Objective  "  Vital Signs:   BP (!) 184/110 (BP Location: Left arm, Patient Position: Sitting, Cuff Size: Adult)   Pulse 101   Temp 97.8 °F (36.6 °C) (Temporal)   Ht 162.6 cm (64\")   Wt 118 kg (259 lb 9.6 oz)   SpO2 96%   BMI 44.56 kg/m²     Wt Readings from Last 3 Encounters:   11/23/22 118 kg (259 lb 9.6 oz)   10/13/22 114 kg (251 lb 12.8 oz)   07/12/21 107 kg (235 lb)     BP Readings from Last 3 Encounters:   11/23/22 (!) 184/110   10/13/22 133/91   07/12/21 157/87     Automatic: 150/85  Manual: 184/110    Physical Exam  Vitals and nursing note reviewed.   Constitutional:       Appearance: Normal appearance. She is well-developed.   HENT:      Head: Normocephalic and atraumatic.      Right Ear: External ear normal.      Left Ear: External ear normal.   Eyes:      Conjunctiva/sclera: Conjunctivae normal.   Cardiovascular:      Rate and Rhythm: Normal rate and regular rhythm.      Pulses: Normal pulses.      Heart sounds: Normal heart sounds. No murmur heard.    No friction rub. No gallop.   Pulmonary:      Effort: Pulmonary effort is normal.      Breath sounds: Normal breath sounds. No wheezing or rhonchi.   Abdominal:      General: Abdomen is flat. There is no distension.      Palpations: Abdomen is soft. There is no mass.      Tenderness: There is no abdominal tenderness.   Musculoskeletal:      Right lower leg: No edema.      Left lower leg: No edema.   Skin:     General: Skin is warm and dry.   Neurological:      General: No focal deficit present.      Mental Status: She is alert. Mental status is at baseline.   Psychiatric:         Mood and Affect: Affect normal.         Behavior: Behavior normal.         Thought Content: Thought content normal. Thought content does not include suicidal ideation.         Judgment: Judgment normal.          Result Review :   The following data was reviewed by: Guy Beatty MD on 11/23/2022:  Common labs    Common Labs 10/13/22 10/13/22 10/13/22 10/13/22 10/13/22    1606 1606 " 1606 1606 1606   Glucose    97    BUN    13    Creatinine    1.03 (A)    Sodium    141    Potassium    4.0    Chloride    103    Calcium    9.5    Albumin    4.00    Total Bilirubin    0.5    Alkaline Phosphatase    108    AST (SGOT)    13    ALT (SGPT)    21    WBC   6.99     Hemoglobin   14.8     Hematocrit   43.1     Platelets   266     Total Cholesterol     136   Triglycerides     78   HDL Cholesterol     53   LDL Cholesterol      68   Hemoglobin A1C 7.80 (A)       Microalbumin, Urine  1.6      (A) Abnormal value                   Lab Results   Component Value Date    COVID19 Not Detected 09/30/2021    INR 0.92 (L) 11/23/2020       Procedures        Assessment and Plan    Diagnoses and all orders for this visit:    1. Anxiety and depression (Primary)  -     sertraline (Zoloft) 25 MG tablet; Take 75 mg by mouth daily (one 25 mg and one 50 mg).  Dispense: 90 tablet; Refill: 2  -     sertraline (Zoloft) 50 MG tablet; Take 75 mg by mouth daily (one 25 mg and one 50 mg).  Dispense: 90 tablet; Refill: 2    2. Type 2 diabetes mellitus with diabetic neuropathy, with long-term current use of insulin ([facility])    3. Essential hypertension  -     amLODIPine-benazepril (Lotrel) 10-20 MG per capsule; Take 1 capsule by mouth Daily.  Dispense: 90 capsule; Refill: 2  -     chlorthalidone (HYGROTON) 25 MG tablet; Take 1 tablet by mouth Daily.  Dispense: 90 tablet; Refill: 2    4. Other specified hypothyroidism    5. Legally blind    6. Hypertensive urgency      Anxiety and Depression:  -will increase zoloft from 50 mg to 75 mg    HTN:  -hypertensive urgency noted, asymptomatic  -will switch from lisinopril to lotrel plus chlorthalidone      Medications Discontinued During This Encounter   Medication Reason   • lisinopril (PRINIVIL,ZESTRIL) 20 MG tablet    • sertraline (Zoloft) 50 MG tablet Reorder          Follow Up   Return in about 3 months (around 2/23/2023) for depression.  Patient was given instructions and counseling regarding  her condition or for health maintenance advice. Please see specific information pulled into the AVS if appropriate.       Guy Beatty MD  11/23/22  13:28 EST

## 2022-12-08 RX ORDER — APIXABAN 5 MG/1
5 TABLET, FILM COATED ORAL 2 TIMES DAILY
Qty: 60 TABLET | Refills: 2 | Status: SHIPPED | OUTPATIENT
Start: 2022-12-08

## 2022-12-13 ENCOUNTER — TELEPHONE (OUTPATIENT)
Dept: INTERNAL MEDICINE | Facility: CLINIC | Age: 52
End: 2022-12-13

## 2022-12-15 ENCOUNTER — TELEPHONE (OUTPATIENT)
Dept: INTERNAL MEDICINE | Facility: CLINIC | Age: 52
End: 2022-12-15

## 2022-12-15 NOTE — TELEPHONE ENCOUNTER
Caller: Carmelina Saldana    Relationship: Self    Best call back number: [phone]    What medication are you requesting: SLEEP AID    What are your current symptoms: INSOMINIA    How long have you been experiencing symptoms: 4 DAYS    Have you had these symptoms before:    [] Yes  [x] No    Have you been treated for these symptoms before:   [] Yes  [x] No    If a prescription is needed, what is your preferred pharmacy and phone number: [facility] [place]     Additional notes: PATIENT STATES MD VILLALTA INCREASED HER DOSAGE OF sertraline (Zoloft) 50 MG tablet, PATIENT FEELS LIKE THAT MAY BE WHAT IS CAUSING THE INSOMNIA HOWEVER FEELS LIKE THE MEDICATION IS WORKING. PATIENT STATES SHE HAS TRIED MELATONIN HOWEVER IT DOESN'T SEEM TO BE HELPING.     PATIENT SCHEDULED:  1/3/2023 FROM THE REFERRAL SENT TO [facility]

## 2022-12-19 NOTE — TELEPHONE ENCOUNTER
Attempted to contact patient. Left message to call the office back.     HUB OK TO READ/ADVISE:  Per Dr. Beatty: I'm happy to see her in clinic to examine her and discuss    HUB PLEASE SCHEDULE PATIENT WITH PROVIDER'S NEXT AVAILABLE OFFICE VISIT

## 2022-12-30 RX ORDER — DULOXETIN HYDROCHLORIDE 60 MG/1
60 CAPSULE, DELAYED RELEASE ORAL DAILY
Qty: 90 CAPSULE | Refills: 3 | Status: SHIPPED | OUTPATIENT
Start: 2022-12-30

## 2022-12-30 RX ORDER — PANTOPRAZOLE SODIUM 40 MG/1
40 TABLET, DELAYED RELEASE ORAL DAILY
Qty: 90 TABLET | Refills: 2 | Status: SHIPPED | OUTPATIENT
Start: 2022-12-30

## 2022-12-30 RX ORDER — INSULIN DEGLUDEC INJECTION 100 U/ML
34 INJECTION, SOLUTION SUBCUTANEOUS DAILY
Qty: 15 ML | Refills: 0 | Status: SHIPPED | OUTPATIENT
Start: 2022-12-30

## 2022-12-30 RX ORDER — ATORVASTATIN CALCIUM 10 MG/1
10 TABLET, FILM COATED ORAL DAILY
Qty: 90 TABLET | Refills: 0 | Status: SHIPPED | OUTPATIENT
Start: 2022-12-30 | End: 2023-03-30 | Stop reason: SDUPTHER

## 2022-12-30 NOTE — TELEPHONE ENCOUNTER
Hard stop for patient being on Zoloft and Cymbalta, please advise if okay for patient to be on both.

## 2023-03-30 DIAGNOSIS — Z79.4 TYPE 2 DIABETES MELLITUS WITH DIABETIC NEUROPATHY, WITH LONG-TERM CURRENT USE OF INSULIN: ICD-10-CM

## 2023-03-30 DIAGNOSIS — E11.40 TYPE 2 DIABETES MELLITUS WITH DIABETIC NEUROPATHY, WITH LONG-TERM CURRENT USE OF INSULIN: ICD-10-CM

## 2023-03-30 DIAGNOSIS — E78.49 OTHER HYPERLIPIDEMIA: Primary | ICD-10-CM

## 2023-03-31 ENCOUNTER — PRIOR AUTHORIZATION (OUTPATIENT)
Dept: INTERNAL MEDICINE | Facility: CLINIC | Age: 53
End: 2023-03-31
Payer: COMMERCIAL

## 2023-03-31 RX ORDER — ATORVASTATIN CALCIUM 10 MG/1
10 TABLET, FILM COATED ORAL DAILY
Qty: 90 TABLET | Refills: 0 | Status: SHIPPED | OUTPATIENT
Start: 2023-03-31

## 2023-03-31 RX ORDER — GABAPENTIN 300 MG/1
300 CAPSULE ORAL 3 TIMES DAILY
Qty: 90 CAPSULE | Refills: 2 | Status: SHIPPED | OUTPATIENT
Start: 2023-03-31

## 2023-03-31 NOTE — TELEPHONE ENCOUNTER
Last Office Visit: 11/23/2022  Next Office Visit: 04/05/2023  Last Date Prescribed: 10/13/2022  Last Urine Drug Screen: 10/13/2022  Date of Signed Controlled Contract:  10/13/2022

## 2023-04-05 ENCOUNTER — OFFICE VISIT (OUTPATIENT)
Dept: INTERNAL MEDICINE | Facility: CLINIC | Age: 53
End: 2023-04-05
Payer: COMMERCIAL

## 2023-04-05 ENCOUNTER — TELEPHONE (OUTPATIENT)
Dept: INTERNAL MEDICINE | Facility: CLINIC | Age: 53
End: 2023-04-05

## 2023-04-05 VITALS
BODY MASS INDEX: 45.07 KG/M2 | WEIGHT: 264 LBS | DIASTOLIC BLOOD PRESSURE: 88 MMHG | HEIGHT: 64 IN | TEMPERATURE: 98.4 F | HEART RATE: 104 BPM | SYSTOLIC BLOOD PRESSURE: 158 MMHG | OXYGEN SATURATION: 96 %

## 2023-04-05 DIAGNOSIS — E66.01 CLASS 3 SEVERE OBESITY DUE TO EXCESS CALORIES WITH SERIOUS COMORBIDITY AND BODY MASS INDEX (BMI) OF 45.0 TO 49.9 IN ADULT: ICD-10-CM

## 2023-04-05 DIAGNOSIS — E03.8 OTHER SPECIFIED HYPOTHYROIDISM: ICD-10-CM

## 2023-04-05 DIAGNOSIS — H54.8 LEGALLY BLIND: ICD-10-CM

## 2023-04-05 DIAGNOSIS — F41.9 ANXIETY AND DEPRESSION: ICD-10-CM

## 2023-04-05 DIAGNOSIS — F32.A ANXIETY AND DEPRESSION: ICD-10-CM

## 2023-04-05 DIAGNOSIS — E11.40 TYPE 2 DIABETES MELLITUS WITH DIABETIC NEUROPATHY, WITH LONG-TERM CURRENT USE OF INSULIN: ICD-10-CM

## 2023-04-05 DIAGNOSIS — I10 ESSENTIAL HYPERTENSION: Primary | ICD-10-CM

## 2023-04-05 DIAGNOSIS — Z79.4 TYPE 2 DIABETES MELLITUS WITH DIABETIC NEUROPATHY, WITH LONG-TERM CURRENT USE OF INSULIN: ICD-10-CM

## 2023-04-05 DIAGNOSIS — E78.49 OTHER HYPERLIPIDEMIA: ICD-10-CM

## 2023-04-05 LAB
ALBUMIN SERPL-MCNC: 4.3 G/DL (ref 3.5–5.2)
ALBUMIN/GLOB SERPL: 1.3 G/DL
ALP SERPL-CCNC: 111 U/L (ref 39–117)
ALT SERPL W P-5'-P-CCNC: 15 U/L (ref 1–33)
ANION GAP SERPL CALCULATED.3IONS-SCNC: 12.9 MMOL/L (ref 5–15)
AST SERPL-CCNC: 15 U/L (ref 1–32)
BILIRUB SERPL-MCNC: 0.6 MG/DL (ref 0–1.2)
BUN SERPL-MCNC: 18 MG/DL (ref 6–20)
BUN/CREAT SERPL: 14.9 (ref 7–25)
CALCIUM SPEC-SCNC: 9.8 MG/DL (ref 8.6–10.5)
CHLORIDE SERPL-SCNC: 100 MMOL/L (ref 98–107)
CHOLEST SERPL-MCNC: 171 MG/DL (ref 0–200)
CO2 SERPL-SCNC: 23.1 MMOL/L (ref 22–29)
CREAT SERPL-MCNC: 1.21 MG/DL (ref 0.57–1)
EGFRCR SERPLBLD CKD-EPI 2021: 53.4 ML/MIN/1.73
GLOBULIN UR ELPH-MCNC: 3.2 GM/DL
GLUCOSE SERPL-MCNC: 327 MG/DL (ref 65–99)
HBA1C MFR BLD: 8 % (ref 4.8–5.6)
HDLC SERPL-MCNC: 68 MG/DL (ref 40–60)
LDLC SERPL CALC-MCNC: 81 MG/DL (ref 0–100)
LDLC/HDLC SERPL: 1.13 {RATIO}
POTASSIUM SERPL-SCNC: 4.7 MMOL/L (ref 3.5–5.2)
PROT SERPL-MCNC: 7.5 G/DL (ref 6–8.5)
SODIUM SERPL-SCNC: 136 MMOL/L (ref 136–145)
TRIGL SERPL-MCNC: 130 MG/DL (ref 0–150)
TSH SERPL DL<=0.05 MIU/L-ACNC: 40.6 UIU/ML (ref 0.27–4.2)
VLDLC SERPL-MCNC: 22 MG/DL (ref 5–40)

## 2023-04-05 PROCEDURE — 80061 LIPID PANEL: CPT | Performed by: STUDENT IN AN ORGANIZED HEALTH CARE EDUCATION/TRAINING PROGRAM

## 2023-04-05 PROCEDURE — 80053 COMPREHEN METABOLIC PANEL: CPT | Performed by: STUDENT IN AN ORGANIZED HEALTH CARE EDUCATION/TRAINING PROGRAM

## 2023-04-05 PROCEDURE — 84443 ASSAY THYROID STIM HORMONE: CPT | Performed by: STUDENT IN AN ORGANIZED HEALTH CARE EDUCATION/TRAINING PROGRAM

## 2023-04-05 PROCEDURE — 83036 HEMOGLOBIN GLYCOSYLATED A1C: CPT | Performed by: STUDENT IN AN ORGANIZED HEALTH CARE EDUCATION/TRAINING PROGRAM

## 2023-04-05 RX ORDER — AMLODIPINE BESYLATE AND BENAZEPRIL HYDROCHLORIDE 10; 40 MG/1; MG/1
1 CAPSULE ORAL DAILY
Qty: 30 CAPSULE | Refills: 11 | Status: SHIPPED | OUTPATIENT
Start: 2023-04-05 | End: 2024-04-04

## 2023-04-05 NOTE — TELEPHONE ENCOUNTER
Medicine sent.  I would expect the pharmacy to contact us if a prior auth, or alternative will be needed.

## 2023-04-05 NOTE — TELEPHONE ENCOUNTER
Contacted pt, pt verified     Patient informed me  sent Saxenda into the pharmacy for her and that they informed her that her insurance would not cover but they would cover Ozempic.   The patient stated she would like Ozempic sent into the pharmacy.  Please advise.   There is the same encounter sent to your inbasket for the spouse as well

## 2023-04-05 NOTE — PROGRESS NOTES
Chief Complaint  Follow-up (Medication ) and Weight Loss (Pt wants to see about getting on saxenda )    Subjective          Carmelina Saldana presents to [facility]  History of Present Illness    Here with .    Weight gain noted since last visit.  Cleaning up diet.  Will soon start daily walks, but currently no regular exercise.    Here primarily with intention of starting weight loss medicines.    Reports is compliant with her medicines.    Blood sugars in morning, fasting, running in 120s.  Denies hypoglycemic measurements.      Current Outpatient Medications   Medication Instructions   • amLODIPine-benazepril (Lotrel) 10-40 MG per capsule 1 capsule, Oral, Daily   • atorvastatin (LIPITOR) 10 mg, Oral, Daily   • chlorthalidone (HYGROTON) 25 mg, Oral, Daily   • DULoxetine (CYMBALTA) 60 mg, Oral, Daily   • Eliquis 5 mg, Oral, 2 Times Daily   • gabapentin (NEURONTIN) 300 mg, Oral, 3 Times Daily   • insulin aspart (NovoLOG FlexPen) 100 UNIT/ML solution pen-injector sc pen Inject 5 units subq 3 times a day prior to meals, with additional sliding scale coverage: b.s. 150-199, 3 additional units, b.s. 200-249, 5U, b.s. 250-299, 8U, b.s. 300-349, 10U, b.s. 350-400, 12U (max of 60 units daily)   • ketorolac (ACULAR) 0.5 % ophthalmic solution instill 1 drop by ophthalmic route  every day in left eye   • levothyroxine (SYNTHROID, LEVOTHROID) 175 mcg, Oral, Daily   • Liraglutide (SAXENDA) 18 MG/3ML injection pen Inject 0.6mg under skin daily for week one, THEN 1.2mg daily for week two, THEN 1.8mg daily for week three, then 2.4mg daily for week four.   • metFORMIN ER (GLUCOPHAGE-XR) 500 mg, Oral, Daily With Breakfast   • pantoprazole (PROTONIX) 40 mg, Oral, Daily   • rOPINIRole (REQUIP) 0.25 mg, Oral, Nightly   • sertraline (Zoloft) 25 MG tablet Take 75 mg by mouth daily (one 25 mg and one 50 mg).   • sertraline (Zoloft) 50 MG tablet Take 75 mg by mouth daily (one 25 mg  "and one 50 mg).   • Tresiba FlexTouch 34 Units, Subcutaneous, Daily       The following portions of the patient's history were reviewed and updated as appropriate: allergies, current medications, past family history, past medical history, past social history, past surgical history, and problem list.    Objective   Vital Signs:   /88 (BP Location: Left arm, Patient Position: Sitting, Cuff Size: Large Adult)   Pulse 104   Temp 98.4 °F (36.9 °C) (Temporal)   Ht 162.6 cm (64\")   Wt 120 kg (264 lb)   SpO2 96%   BMI 45.32 kg/m²     Wt Readings from Last 3 Encounters:   04/05/23 120 kg (264 lb)   11/23/22 118 kg (259 lb 9.6 oz)   10/13/22 114 kg (251 lb 12.8 oz)     BP Readings from Last 3 Encounters:   04/05/23 158/88   11/23/22 (!) 184/110   10/13/22 133/91     Physical Exam  Vitals reviewed.   Constitutional:       General: She is not in acute distress.     Appearance: Normal appearance. She is not ill-appearing, toxic-appearing or diaphoretic.   HENT:      Head: Normocephalic and atraumatic.      Right Ear: External ear normal.      Left Ear: External ear normal.   Eyes:      Conjunctiva/sclera: Conjunctivae normal.   Cardiovascular:      Rate and Rhythm: Normal rate and regular rhythm.      Pulses: Normal pulses.      Heart sounds: Normal heart sounds. No murmur heard.    No friction rub. No gallop.   Pulmonary:      Effort: Pulmonary effort is normal. No respiratory distress.      Breath sounds: Normal breath sounds. No stridor. No wheezing, rhonchi or rales.   Chest:      Chest wall: No tenderness.   Abdominal:      General: Abdomen is flat.      Palpations: Abdomen is soft. There is no mass.      Tenderness: There is no abdominal tenderness.   Musculoskeletal:      Right lower leg: No edema.      Left lower leg: No edema.   Skin:     General: Skin is warm and dry.   Neurological:      General: No focal deficit present.      Mental Status: She is alert. Mental status is at baseline.   Psychiatric:        "  Behavior: Behavior normal.         Thought Content: Thought content normal.         Judgment: Judgment normal.          Result Review :   The following data was reviewed by: Guy Beatty MD on 04/05/2023:  Common labs    Common Labs 10/13/22 10/13/22 10/13/22 10/13/22 10/13/22    1606 1606 1606 1606 1606   Glucose    97    BUN    13    Creatinine    1.03 (A)    Sodium    141    Potassium    4.0    Chloride    103    Calcium    9.5    Albumin    4.00    Total Bilirubin    0.5    Alkaline Phosphatase    108    AST (SGOT)    13    ALT (SGPT)    21    WBC   6.99     Hemoglobin   14.8     Hematocrit   43.1     Platelets   266     Total Cholesterol     136   Triglycerides     78   HDL Cholesterol     53   LDL Cholesterol      68   Hemoglobin A1C 7.80 (A)       Microalbumin, Urine  1.6      (A) Abnormal value                   Lab Results   Component Value Date    COVID19 Not Detected 09/30/2021    INR 0.92 (L) 11/23/2020       Procedures        Assessment and Plan    Diagnoses and all orders for this visit:    1. Essential hypertension (Primary)  -     Comprehensive Metabolic Panel  -     amLODIPine-benazepril (Lotrel) 10-40 MG per capsule; Take 1 capsule by mouth Daily.  Dispense: 30 capsule; Refill: 11    2. Type 2 diabetes mellitus with diabetic neuropathy, with long-term current use of insulin  -     Comprehensive Metabolic Panel  -     Hemoglobin A1c  -     Liraglutide (SAXENDA) 18 MG/3ML injection pen; Inject 0.6mg under skin daily for week one, THEN 1.2mg daily for week two, THEN 1.8mg daily for week three, then 2.4mg daily for week four.  Dispense: 3 mL; Refill: 3    3. Anxiety and depression    4. Legally blind    5. Other specified hypothyroidism  -     TSH    6. Other hyperlipidemia  -     Lipid Panel    7. Class 3 severe obesity due to excess calories with serious comorbidity and body mass index (BMI) of 45.0 to 49.9 in adult  -     Liraglutide (SAXENDA) 18 MG/3ML injection pen; Inject 0.6mg under skin  daily for week one, THEN 1.2mg daily for week two, THEN 1.8mg daily for week three, then 2.4mg daily for week four.  Dispense: 3 mL; Refill: 3    HTN:  -will increase lotrel  -low sodium dietary counseling today, recommending less than 2,300 mg sodium daily    Obesity:  -will trial saxenda for T2DM as well as for treatment of obesity  -nutritional and exercise counseling today (and highly encouraged both calorie counting as well as regular low impact exercise)      Medications Discontinued During This Encounter   Medication Reason   • amLODIPine-benazepril (Lotrel) 10-20 MG per capsule           Follow Up   Return in about 3 months (around 7/5/2023) for Annual physical.  Patient was given instructions and counseling regarding her condition or for health maintenance advice. Please see specific information pulled into the AVS if appropriate.       Guy Beatty MD  04/05/23  09:22 EDT

## 2023-04-05 NOTE — TELEPHONE ENCOUNTER
Caller: Carmelina Saldana    Relationship to patient: Self    Best call back number: [phone]    Patient is needing: PATIENT STATES THAT SHE WAS JUST PUT ON SAXENDA AND HER INSURANCE DOES NOT COVER THIS MEDICATION. SHE IS REQUESTING ANOTHER MEDICATION THAT IS SIMILAR.

## 2023-04-06 DIAGNOSIS — E03.8 OTHER SPECIFIED HYPOTHYROIDISM: ICD-10-CM

## 2023-04-06 RX ORDER — LEVOTHYROXINE SODIUM 0.2 MG/1
200 TABLET ORAL DAILY
Qty: 90 TABLET | Refills: 2 | Status: SHIPPED | OUTPATIENT
Start: 2023-04-06

## 2023-04-06 NOTE — TELEPHONE ENCOUNTER
Attempted to contact patient. Left message to call the office back.     HUB OK TO READ/ADVISE:  Per Guy Beatty MD-  Medicine sent.  I would expect the pharmacy to contact us if a prior auth, or alternative will be needed.

## 2023-04-12 ENCOUNTER — TELEPHONE (OUTPATIENT)
Dept: INTERNAL MEDICINE | Facility: CLINIC | Age: 53
End: 2023-04-12
Payer: COMMERCIAL

## 2023-04-12 DIAGNOSIS — Z79.4 TYPE 2 DIABETES MELLITUS WITH DIABETIC NEUROPATHY, WITH LONG-TERM CURRENT USE OF INSULIN: Primary | ICD-10-CM

## 2023-04-12 DIAGNOSIS — E11.40 TYPE 2 DIABETES MELLITUS WITH DIABETIC NEUROPATHY, WITH LONG-TERM CURRENT USE OF INSULIN: Primary | ICD-10-CM

## 2023-04-12 DIAGNOSIS — E66.01 CLASS 3 SEVERE OBESITY DUE TO EXCESS CALORIES WITH SERIOUS COMORBIDITY AND BODY MASS INDEX (BMI) OF 45.0 TO 49.9 IN ADULT: ICD-10-CM

## 2023-04-12 NOTE — TELEPHONE ENCOUNTER
Caller: Carmelina Saldana    Relationship to patient: Self    Best call back number: [phone]    Patient is needing: PATIENT STATED THAT AFTER 4/5/23 APPOINTMENT THAT THE OZEMPIC OR A DIFFERENT MEDICATION OF THAT SORT WAS NEEDING MEDICATION PRIOR AUTHORIZATION PER HER PHARMACY  [id number] - [place], [place] - [address] AT [facility] [phone] [facility] [phone] FX

## 2023-04-17 ENCOUNTER — TELEPHONE (OUTPATIENT)
Dept: INTERNAL MEDICINE | Facility: CLINIC | Age: 53
End: 2023-04-17

## 2023-04-17 NOTE — TELEPHONE ENCOUNTER
Caller: Carmelina Saldana    Relationship to patient: Self    Best call back number:[phone]    Patient is needing:PATIENT CALLED, READ THE HUB TO READ, PATIENT UNDERSTOOD.     Attempted to contact patient. Left message to call the office back.      HUB OK TO READ/ADVISE:  Ozempic has been sent in to the pharmacy.

## 2023-04-20 ENCOUNTER — PRIOR AUTHORIZATION (OUTPATIENT)
Dept: INTERNAL MEDICINE | Facility: CLINIC | Age: 53
End: 2023-04-20
Payer: COMMERCIAL

## 2023-04-20 NOTE — TELEPHONE ENCOUNTER
PA started via Covermymeds:  KEY: [id number]  Sent to plan 4/20/2023    MEMBER SHOULD BE ABLE TO GET THE DRUG / PRODUCT WITHOUT A PA AT THIS TIME -

## 2023-05-17 ENCOUNTER — TELEPHONE (OUTPATIENT)
Dept: INTERNAL MEDICINE | Facility: CLINIC | Age: 53
End: 2023-05-17
Payer: COMMERCIAL

## 2023-05-17 DIAGNOSIS — E11.40 TYPE 2 DIABETES MELLITUS WITH DIABETIC NEUROPATHY, WITH LONG-TERM CURRENT USE OF INSULIN: ICD-10-CM

## 2023-05-17 DIAGNOSIS — Z79.4 TYPE 2 DIABETES MELLITUS WITH DIABETIC NEUROPATHY, WITH LONG-TERM CURRENT USE OF INSULIN: ICD-10-CM

## 2023-05-17 RX ORDER — INSULIN ASPART 100 [IU]/ML
INJECTION, SOLUTION INTRAVENOUS; SUBCUTANEOUS
Qty: 15 ML | Refills: 3 | Status: SHIPPED | OUTPATIENT
Start: 2023-05-17

## 2023-05-17 NOTE — TELEPHONE ENCOUNTER
Caller: Carmelina Saldana    Relationship to patient: Self    Best call back number: [phone]     Patient is needing: PATIENT UNDERSTOOD THE HUB TO READ.

## 2023-05-17 NOTE — TELEPHONE ENCOUNTER
Attempted to contact patient. Left message to call the office back.     HUB OK TO READ/ADVISE:  Medication was sent to the pharmacy

## 2023-05-17 NOTE — TELEPHONE ENCOUNTER
Caller: Carmelina Saldana    Relationship: Self    Best call back number: [phone]    Requested Prescriptions:   Requested Prescriptions     Pending Prescriptions Disp Refills   • insulin aspart (NovoLOG FlexPen) 100 UNIT/ML solution pen-injector sc pen 15 mL 3     Sig: Inject 5 units subq 3 times a day prior to meals, with additional sliding scale coverage: b.s. 150-199, 3 additional units, b.s. 200-249, 5U, b.s. 250-299, 8U, b.s. 300-349, 10U, b.s. 350-400, 12U (max of 60 units daily)        Pharmacy where request should be sent: [id number] [place] [address] AT [facility] [phone] [facility] [phone] FX     Last office visit with prescribing clinician: 4/5/2023   Last telemedicine visit with prescribing clinician: 4/20/2023   Next office visit with prescribing clinician: Visit date not found     Does the patient have less than a 3 day supply:  [x] Yes  [] No    Would you like a call back once the refill request has been completed: [x] Yes [] No    If the office needs to give you a call back, can they leave a voicemail: [x] Yes [] No    Rosario Kruger, PCT   05/17/23 12:58 EDT

## 2023-07-29 DIAGNOSIS — Z79.4 TYPE 2 DIABETES MELLITUS WITH DIABETIC NEUROPATHY, WITH LONG-TERM CURRENT USE OF INSULIN: ICD-10-CM

## 2023-07-29 DIAGNOSIS — E11.40 TYPE 2 DIABETES MELLITUS WITH DIABETIC NEUROPATHY, WITH LONG-TERM CURRENT USE OF INSULIN: ICD-10-CM

## 2023-07-31 DIAGNOSIS — F41.9 ANXIETY AND DEPRESSION: ICD-10-CM

## 2023-07-31 DIAGNOSIS — F32.A ANXIETY AND DEPRESSION: ICD-10-CM

## 2023-07-31 RX ORDER — GABAPENTIN 300 MG/1
CAPSULE ORAL
Qty: 90 CAPSULE | Refills: 2 | Status: SHIPPED | OUTPATIENT
Start: 2023-07-31

## 2023-07-31 RX ORDER — APIXABAN 5 MG/1
5 TABLET, FILM COATED ORAL 2 TIMES DAILY
Qty: 60 TABLET | Refills: 2 | Status: SHIPPED | OUTPATIENT
Start: 2023-07-31

## 2023-08-31 ENCOUNTER — TELEPHONE (OUTPATIENT)
Dept: INTERNAL MEDICINE | Facility: CLINIC | Age: 53
End: 2023-08-31
Payer: COMMERCIAL

## 2023-08-31 NOTE — TELEPHONE ENCOUNTER
[facility] CALLED WANTING A VERBAL FOR OMIPOD DASH     PATIENT HAS NOT BEEN SEEN SINCE 4/5/2023    I TOLD DR. VILLALTA ABOUT IT AND HE STATED THAT SINCE SHE HASN'T SEEN HER HE STATED THAT HE WOULDN'T DO A VERBAL

## 2023-09-08 ENCOUNTER — TELEPHONE (OUTPATIENT)
Dept: INTERNAL MEDICINE | Facility: CLINIC | Age: 53
End: 2023-09-08

## 2023-09-08 NOTE — TELEPHONE ENCOUNTER
Caller: Carmelina Saldana    Relationship: Self    Best call back number: [phone]     What is the best time to reach you: ANY    Who are you requesting to speak with (clinical staff, provider,  specific staff member): CLINICAL      What was the call regarding: PATIENT WOULD LIKE A CALL BACK TO DISCUSS OMNIPOD PRESCRIPTION.

## 2023-09-19 ENCOUNTER — DOCUMENTATION (OUTPATIENT)
Dept: INTERNAL MEDICINE | Facility: CLINIC | Age: 53
End: 2023-09-19
Payer: COMMERCIAL

## 2023-09-21 DIAGNOSIS — I10 ESSENTIAL HYPERTENSION: ICD-10-CM

## 2023-09-21 DIAGNOSIS — Z79.4 TYPE 2 DIABETES MELLITUS WITH DIABETIC NEUROPATHY, WITH LONG-TERM CURRENT USE OF INSULIN: ICD-10-CM

## 2023-09-21 DIAGNOSIS — E11.40 TYPE 2 DIABETES MELLITUS WITH DIABETIC NEUROPATHY, WITH LONG-TERM CURRENT USE OF INSULIN: ICD-10-CM

## 2023-09-21 RX ORDER — PANTOPRAZOLE SODIUM 40 MG/1
TABLET, DELAYED RELEASE ORAL
Qty: 90 TABLET | Refills: 2 | Status: SHIPPED | OUTPATIENT
Start: 2023-09-21

## 2023-09-21 RX ORDER — METFORMIN HYDROCHLORIDE 500 MG/1
TABLET, EXTENDED RELEASE ORAL
Qty: 90 TABLET | Refills: 3 | Status: SHIPPED | OUTPATIENT
Start: 2023-09-21

## 2023-09-21 RX ORDER — CHLORTHALIDONE 25 MG/1
TABLET ORAL
Qty: 90 TABLET | Refills: 2 | Status: SHIPPED | OUTPATIENT
Start: 2023-09-21

## 2023-10-06 ENCOUNTER — OFFICE VISIT (OUTPATIENT)
Dept: INTERNAL MEDICINE | Facility: CLINIC | Age: 53
End: 2023-10-06
Payer: COMMERCIAL

## 2023-10-06 VITALS
SYSTOLIC BLOOD PRESSURE: 109 MMHG | TEMPERATURE: 97.2 F | WEIGHT: 243 LBS | HEIGHT: 64 IN | DIASTOLIC BLOOD PRESSURE: 74 MMHG | BODY MASS INDEX: 41.48 KG/M2 | HEART RATE: 98 BPM | OXYGEN SATURATION: 98 %

## 2023-10-06 DIAGNOSIS — I10 ESSENTIAL HYPERTENSION: ICD-10-CM

## 2023-10-06 DIAGNOSIS — Z12.31 SCREENING MAMMOGRAM FOR BREAST CANCER: ICD-10-CM

## 2023-10-06 DIAGNOSIS — Z79.4 TYPE 2 DIABETES MELLITUS WITH DIABETIC NEUROPATHY, WITH LONG-TERM CURRENT USE OF INSULIN: Primary | ICD-10-CM

## 2023-10-06 DIAGNOSIS — Z23 ENCOUNTER FOR IMMUNIZATION: ICD-10-CM

## 2023-10-06 DIAGNOSIS — F32.A ANXIETY AND DEPRESSION: ICD-10-CM

## 2023-10-06 DIAGNOSIS — E11.40 TYPE 2 DIABETES MELLITUS WITH DIABETIC NEUROPATHY, WITH LONG-TERM CURRENT USE OF INSULIN: Primary | ICD-10-CM

## 2023-10-06 DIAGNOSIS — F41.9 ANXIETY AND DEPRESSION: ICD-10-CM

## 2023-10-06 DIAGNOSIS — E03.8 OTHER SPECIFIED HYPOTHYROIDISM: ICD-10-CM

## 2023-10-06 RX ORDER — PREDNISOLONE ACETATE 10 MG/ML
SUSPENSION/ DROPS OPHTHALMIC
COMMUNITY
Start: 2023-09-27

## 2023-10-06 RX ORDER — MOXIFLOXACIN HCL 0.5 %
DROPS OPHTHALMIC (EYE)
COMMUNITY
Start: 2023-09-28

## 2023-10-06 NOTE — PROGRESS NOTES
Chief Complaint  medication concern (Pt wants to try Omni pod) and Med Refill (Unsure of the meds she needs refilled ) Diabetes    Subjective          Carmelina Saldana presents to [facility]  History of Present Illness    Here with .    Eye surgery for left eye planned next Thursday.  Will then get chronic injections (Dr. Harris, Reynold and Katelyn).    Checks b.s..  AM measurements are  at home.  Has occasional lows (had low of 57).    Taking 34U of tresia as well as Sliding scale novolog.  Is interested in insulin pump.  Have placed referral to establish with Scarlet Landeros.      Current Outpatient Medications   Medication Instructions    amLODIPine-benazepril (Lotrel) 10-40 MG per capsule 1 capsule, Oral, Daily    atorvastatin (LIPITOR) 10 mg, Oral, Daily    chlorthalidone (HYGROTON) 25 MG tablet TAKE 1 TABLET BY MOUTH EVERY DAY    DULoxetine (CYMBALTA) 60 mg, Oral, Daily    Eliquis 5 mg, Oral, 2 Times Daily    gabapentin (NEURONTIN) 300 MG capsule TAKE 1 CAPSULE BY MOUTH THREE TIMES DAILY    insulin aspart (NovoLOG FlexPen) 100 UNIT/ML solution pen-injector sc pen Inject 5 units subq 3 times a day prior to meals, with additional sliding scale coverage: b.s. 150-199, 3 additional units, b.s. 200-249, 5U, b.s. 250-299, 8U, b.s. 300-349, 10U, b.s. 350-400, 12U (max of 60 units daily)    ketorolac (ACULAR) 0.5 % ophthalmic solution instill 1 drop by ophthalmic route  every day in left eye    levothyroxine (SYNTHROID, LEVOTHROID) 200 mcg, Oral, Daily    metFORMIN ER (GLUCOPHAGE-XR) 500 MG 24 hr tablet TAKE 1 TABLET BY MOUTH EVERY DAY WITH BREAKFAST    pantoprazole (PROTONIX) 40 MG EC tablet TAKE 1 TABLET BY MOUTH EVERY DAY    prednisoLONE acetate (PRED FORTE) 1 % ophthalmic suspension     rOPINIRole (REQUIP) 0.25 mg, Oral, Nightly    Semaglutide,0.25 or 0.5MG/DOS, (OZEMPIC) 2 MG/1.5ML solution pen-injector Take 0.25 mg subcutaneous once a week for week  "1-4, then increase to 0.5 mg subcutaneous once weekly    sertraline (Zoloft) 25 MG tablet Take 75 mg by mouth daily (one 25 mg and one 50 mg).    sertraline (Zoloft) 50 MG tablet Take 75 mg by mouth daily (one 25 mg and one 50 mg).    Tresiba FlexTouch 34 Units, Subcutaneous, Daily    Vigamox 0.5 % ophthalmic solution        The following portions of the patient's history were reviewed and updated as appropriate: allergies, current medications, past family history, past medical history, past social history, past surgical history, and problem list.    Objective   Vital Signs:   /74 (BP Location: Right arm, Patient Position: Sitting, Cuff Size: Large Adult)   Pulse 98   Temp 97.2 °F (36.2 °C) (Temporal)   Ht 162.6 cm (64\")   Wt 110 kg (243 lb)   SpO2 98%   BMI 41.71 kg/m²     BP Readings from Last 3 Encounters:   10/06/23 109/74   04/05/23 158/88   11/23/22 (!) 184/110     Wt Readings from Last 3 Encounters:   10/06/23 110 kg (243 lb)   04/05/23 120 kg (264 lb)   11/23/22 118 kg (259 lb 9.6 oz)           Physical Exam  Vitals reviewed.   Constitutional:       General: She is not in acute distress.     Appearance: Normal appearance. She is not ill-appearing, toxic-appearing or diaphoretic.   HENT:      Head: Normocephalic and atraumatic.      Right Ear: External ear normal.      Left Ear: External ear normal.   Eyes:      Conjunctiva/sclera: Conjunctivae normal.   Cardiovascular:      Rate and Rhythm: Normal rate and regular rhythm.      Pulses: Normal pulses.      Heart sounds: Normal heart sounds. No murmur heard.    No friction rub. No gallop.   Pulmonary:      Effort: Pulmonary effort is normal. No respiratory distress.      Breath sounds: Normal breath sounds. No stridor. No wheezing, rhonchi or rales.   Chest:      Chest wall: No tenderness.   Abdominal:      General: Abdomen is flat.      Palpations: Abdomen is soft. There is no mass.      Tenderness: There is no abdominal tenderness. "   Musculoskeletal:      Right lower leg: No edema.      Left lower leg: No edema.   Skin:     General: Skin is warm and dry.   Neurological:      General: No focal deficit present.      Mental Status: She is alert. Mental status is at baseline.   Psychiatric:         Behavior: Behavior normal.         Thought Content: Thought content normal.         Judgment: Judgment normal.        Result Review :   The following data was reviewed by: Guy Beatty MD on 10/06/2023:  Common labs          10/13/2022    16:06 4/5/2023    09:39   Common Labs   Glucose 97  327    BUN 13  18    Creatinine 1.03  1.21    Sodium 141  136    Potassium 4.0  4.7    Chloride 103  100    Calcium 9.5  9.8    Albumin 4.00  4.3    Total Bilirubin 0.5  0.6    Alkaline Phosphatase 108  111    AST (SGOT) 13  15    ALT (SGPT) 21  15    WBC 6.99     Hemoglobin 14.8     Hematocrit 43.1     Platelets 266     Total Cholesterol 136  171    Triglycerides 78  130    HDL Cholesterol 53  68    LDL Cholesterol  68  81    Hemoglobin A1C 7.80  8.00    Microalbumin, Urine 1.6              Lab Results   Component Value Date    COVID19 Not Detected 09/30/2021    INR 0.92 (L) 11/23/2020       Procedures        Assessment and Plan    Diagnoses and all orders for this visit:    1. Type 2 diabetes mellitus with diabetic neuropathy, with long-term current use of insulin (Primary)  -     Comprehensive Metabolic Panel; Future  -     Hemoglobin A1c; Future  -     Ambulatory Referral to Endocrinology    2. Essential hypertension  -     Comprehensive Metabolic Panel; Future    3. Anxiety and depression    4. Other specified hypothyroidism  -     TSH; Future    5. Screening mammogram for breast cancer  -     Mammo Screening Digital Tomosynthesis Bilateral With CAD; Future    6. Encounter for immunization  -     Fluzone (or Fluarix & Flulaval for VFC) >6 Mos ([id number])      T2DM:  -Endocrine referral placed  -reviewed the rule of 15 for treatment of hypoglycemia  today    Immunization:  -Discussed risks/benefits to vaccination, reviewed components of the vaccine, discussed VIS, discussed informed consent, informed consent obtained. Patient/Parent was allowed to accept or refuse vaccine. Questions answered to satisfactory state of patient/parent. We reviewed typical age appropriate and seasonally appropriate vaccinations. Reviewed immunization history and updated state vaccination form as needed. Patient/Parent was counseled on the above vaccines.      There are no discontinued medications.       Follow Up   Return in about 3 months (around 1/6/2024) for Annual physical.  Patient was given instructions and counseling regarding her condition or for health maintenance advice. Please see specific information pulled into the AVS if appropriate.       Guy Beatty MD  10/06/23  17:11 EDT

## 2023-10-10 ENCOUNTER — DOCUMENTATION (OUTPATIENT)
Dept: INTERNAL MEDICINE | Facility: CLINIC | Age: 53
End: 2023-10-10
Payer: COMMERCIAL

## 2023-11-10 NOTE — TELEPHONE ENCOUNTER
Caller: Carmelina Saldana    Relationship: Self    Best call back number:     Requested Prescriptions:   Requested Prescriptions     Pending Prescriptions Disp Refills    rOPINIRole (REQUIP) 0.25 MG tablet 90 tablet 1     Sig: Take 1 tablet by mouth Every Night.        Pharmacy where request should be sent: [id number] [place] - [address] AT [facility] [phone] [facility] [phone] FX     Last office visit with prescribing clinician: 10/6/2023   Last telemedicine visit with prescribing clinician: Visit date not found   Next office visit with prescribing clinician: 2/1/2024     Additional details provided by patient: PATIENT WOULD LIKE THE MEDICATION TODAY SINCE SHE IS GOING OUT OF TOWN ON SUNDAY.    Does the patient have less than a 3 day supply:  [x] Yes  [] No      Juan A Walker Rep   11/10/23 14:42 EST

## 2023-11-13 RX ORDER — ROPINIROLE 0.25 MG/1
0.25 TABLET, FILM COATED ORAL NIGHTLY
Qty: 90 TABLET | Refills: 1 | Status: SHIPPED | OUTPATIENT
Start: 2023-11-13

## 2023-12-06 DIAGNOSIS — Z79.4 TYPE 2 DIABETES MELLITUS WITH DIABETIC NEUROPATHY, WITH LONG-TERM CURRENT USE OF INSULIN: ICD-10-CM

## 2023-12-06 DIAGNOSIS — E11.40 TYPE 2 DIABETES MELLITUS WITH DIABETIC NEUROPATHY, WITH LONG-TERM CURRENT USE OF INSULIN: ICD-10-CM

## 2023-12-06 RX ORDER — INSULIN ASPART 100 [IU]/ML
INJECTION, SOLUTION INTRAVENOUS; SUBCUTANEOUS
Qty: 15 ML | Refills: 3 | Status: SHIPPED | OUTPATIENT
Start: 2023-12-06

## 2023-12-15 ENCOUNTER — OFFICE VISIT (OUTPATIENT)
Dept: INTERNAL MEDICINE | Facility: CLINIC | Age: 53
End: 2023-12-15
Payer: COMMERCIAL

## 2023-12-15 VITALS
OXYGEN SATURATION: 95 % | WEIGHT: 246 LBS | SYSTOLIC BLOOD PRESSURE: 121 MMHG | HEART RATE: 90 BPM | HEIGHT: 64 IN | TEMPERATURE: 97.8 F | DIASTOLIC BLOOD PRESSURE: 77 MMHG | BODY MASS INDEX: 42 KG/M2

## 2023-12-15 DIAGNOSIS — E03.8 OTHER SPECIFIED HYPOTHYROIDISM: ICD-10-CM

## 2023-12-15 DIAGNOSIS — Z79.4 TYPE 2 DIABETES MELLITUS WITH DIABETIC NEUROPATHY, WITH LONG-TERM CURRENT USE OF INSULIN: ICD-10-CM

## 2023-12-15 DIAGNOSIS — M54.2 CERVICALGIA: ICD-10-CM

## 2023-12-15 DIAGNOSIS — F41.9 ANXIETY AND DEPRESSION: ICD-10-CM

## 2023-12-15 DIAGNOSIS — I10 ESSENTIAL HYPERTENSION: ICD-10-CM

## 2023-12-15 DIAGNOSIS — E11.40 TYPE 2 DIABETES MELLITUS WITH DIABETIC NEUROPATHY, WITH LONG-TERM CURRENT USE OF INSULIN: ICD-10-CM

## 2023-12-15 DIAGNOSIS — E78.49 OTHER HYPERLIPIDEMIA: ICD-10-CM

## 2023-12-15 DIAGNOSIS — Z23 ENCOUNTER FOR IMMUNIZATION: ICD-10-CM

## 2023-12-15 DIAGNOSIS — F32.A ANXIETY AND DEPRESSION: ICD-10-CM

## 2023-12-15 DIAGNOSIS — R42 DIZZINESS: Primary | ICD-10-CM

## 2023-12-15 LAB
ALBUMIN SERPL-MCNC: 4.4 G/DL (ref 3.5–5.2)
ALBUMIN/GLOB SERPL: 1.3 G/DL
ALP SERPL-CCNC: 116 U/L (ref 39–117)
ALT SERPL W P-5'-P-CCNC: 17 U/L (ref 1–33)
ANION GAP SERPL CALCULATED.3IONS-SCNC: 14.5 MMOL/L (ref 5–15)
AST SERPL-CCNC: 23 U/L (ref 1–32)
BILIRUB SERPL-MCNC: 0.4 MG/DL (ref 0–1.2)
BUN SERPL-MCNC: 25 MG/DL (ref 6–20)
BUN/CREAT SERPL: 19.8 (ref 7–25)
CALCIUM SPEC-SCNC: 9.9 MG/DL (ref 8.6–10.5)
CHLORIDE SERPL-SCNC: 99 MMOL/L (ref 98–107)
CO2 SERPL-SCNC: 23.5 MMOL/L (ref 22–29)
CREAT SERPL-MCNC: 1.26 MG/DL (ref 0.57–1)
EGFRCR SERPLBLD CKD-EPI 2021: 50.8 ML/MIN/1.73
GLOBULIN UR ELPH-MCNC: 3.3 GM/DL
GLUCOSE SERPL-MCNC: 213 MG/DL (ref 65–99)
POTASSIUM SERPL-SCNC: 4.5 MMOL/L (ref 3.5–5.2)
PROT SERPL-MCNC: 7.7 G/DL (ref 6–8.5)
SODIUM SERPL-SCNC: 137 MMOL/L (ref 136–145)
TSH SERPL DL<=0.05 MIU/L-ACNC: 7.89 UIU/ML (ref 0.27–4.2)

## 2023-12-15 PROCEDURE — 83036 HEMOGLOBIN GLYCOSYLATED A1C: CPT | Performed by: STUDENT IN AN ORGANIZED HEALTH CARE EDUCATION/TRAINING PROGRAM

## 2023-12-15 PROCEDURE — 84443 ASSAY THYROID STIM HORMONE: CPT | Performed by: STUDENT IN AN ORGANIZED HEALTH CARE EDUCATION/TRAINING PROGRAM

## 2023-12-15 PROCEDURE — 80053 COMPREHEN METABOLIC PANEL: CPT | Performed by: STUDENT IN AN ORGANIZED HEALTH CARE EDUCATION/TRAINING PROGRAM

## 2023-12-15 RX ORDER — SERTRALINE HYDROCHLORIDE 25 MG/1
75 TABLET, FILM COATED ORAL DAILY
Qty: 270 TABLET | Refills: 2 | Status: SHIPPED | OUTPATIENT
Start: 2023-12-15

## 2023-12-15 RX ORDER — ATORVASTATIN CALCIUM 10 MG/1
10 TABLET, FILM COATED ORAL DAILY
Qty: 90 TABLET | Refills: 0 | Status: SHIPPED | OUTPATIENT
Start: 2023-12-15

## 2023-12-15 NOTE — PROGRESS NOTES
"Chief Complaint  Dizziness and Neck Pain (Patient states sometimes her head feels heavy and it makes her neck hurt)    Subjective          Carmelina Saldana presents to [facility]  History of Present Illness    Uncle passed away in [place] in the interim in mid November.  Around the same time, Ms. Saldana started having issues of neck pain as well as dizziness.  Neck pain is present at the base of her neck.  It is exacerbated by looking left or right.  She has no pain when looking down or looking up.  She does report some popping with neck movements.    Regarding her dizziness, she denies any sensation of vertigo with the room spinning around.  She denies any ear pain.  Ms. Saldana reports episodes of lightheadedness and feeling \"drunk\" for short period of time after standing up.  She denies any syncopal episodes, and she denies falling.  Of note, she has been on a stable blood pressure regimen for quite some time.  Also of note, she does not check her blood pressure.    Ms. Saldana does however check her blood sugar 3-4 times a day.  She reports that most the time they have been in the 130s.  In the last month, she has had a low reading of 78.  She has had a high of 156.    She reports that she is still feeling quite depressed.  She denies suicidal ideation.  Of note, the pharmacy refused to fill the 25 mg Zoloft that I had filled so that she can take a total dose of 75 mg Zoloft.  She is only currently taking 50 mg of Zoloft.  I have resent the prescription today as three 25 mg tabs to take for a total dose of 75 mg a day.    Current Outpatient Medications   Medication Instructions    amLODIPine-benazepril (Lotrel) 10-40 MG per capsule 1 capsule, Oral, Daily    atorvastatin (LIPITOR) 10 mg, Oral, Daily    chlorthalidone (HYGROTON) 25 MG tablet TAKE 1 TABLET BY MOUTH EVERY DAY    DULoxetine (CYMBALTA) 60 mg, Oral, Daily    Eliquis 5 mg, Oral, 2 Times Daily    gabapentin " "(NEURONTIN) 300 MG capsule TAKE 1 CAPSULE BY MOUTH THREE TIMES DAILY    Insulin Aspart FlexPen 100 UNIT/ML solution pen-injector INJECT 5 UNITS UNDER THE SKIN THREE TIMES DAILY PRIOR TO MEALS, WITH ADDITIONAL SLIDING SCALE AS DIRECTED; MAX UNITS PER DAY IS 60    ketorolac (ACULAR) 0.5 % ophthalmic solution instill 1 drop by ophthalmic route  every day in left eye    levothyroxine (SYNTHROID, LEVOTHROID) 200 mcg, Oral, Daily    metFORMIN ER (GLUCOPHAGE-XR) 500 MG 24 hr tablet TAKE 1 TABLET BY MOUTH EVERY DAY WITH BREAKFAST    pantoprazole (PROTONIX) 40 MG EC tablet TAKE 1 TABLET BY MOUTH EVERY DAY    prednisoLONE acetate (PRED FORTE) 1 % ophthalmic suspension     rOPINIRole (REQUIP) 0.25 mg, Oral, Nightly    Semaglutide,0.25 or 0.5MG/DOS, (OZEMPIC) 2 MG/1.5ML solution pen-injector Take 0.25 mg subcutaneous once a week for week 1-4, then increase to 0.5 mg subcutaneous once weekly    sertraline (ZOLOFT) 75 mg, Oral, Daily, Take 75 mg by mouth daily (one 25 mg and one 50 mg).    Tresiba FlexTouch 34 Units, Subcutaneous, Daily    Vigamox 0.5 % ophthalmic solution        The following portions of the patient's history were reviewed and updated as appropriate: allergies, current medications, past family history, past medical history, past social history, past surgical history, and problem list.    Objective   Vital Signs:   /77 (BP Location: Left arm, Patient Position: Sitting, Cuff Size: Large Adult)   Pulse 90   Temp 97.8 °F (36.6 °C) (Temporal)   Ht 162.6 cm (64\")   Wt 112 kg (246 lb)   SpO2 95%   BMI 42.23 kg/m²     BP Readings from Last 3 Encounters:   12/15/23 121/77   10/06/23 109/74   04/05/23 158/88     Wt Readings from Last 3 Encounters:   12/15/23 112 kg (246 lb)   10/06/23 110 kg (243 lb)   04/05/23 120 kg (264 lb)     Vitals:    12/15/23 1145 12/15/23 1221 12/15/23 1222 12/15/23 1223   Orthostatic BP:  109/73 110/75 123/81   Orthostatic Pulse:  89 95 93   Patient Position: Sitting Sitting Standing " Lying           Physical Exam  Vitals reviewed.   Constitutional:       General: She is not in acute distress.     Appearance: Normal appearance. She is not ill-appearing, toxic-appearing or diaphoretic.   HENT:      Head: Normocephalic and atraumatic.      Right Ear: External ear normal.      Left Ear: External ear normal.   Eyes:      Conjunctiva/sclera: Conjunctivae normal.   Cardiovascular:      Rate and Rhythm: Normal rate and regular rhythm.      Pulses: Normal pulses.      Heart sounds: Normal heart sounds. No murmur heard.     No friction rub. No gallop.   Pulmonary:      Effort: Pulmonary effort is normal. No respiratory distress.      Breath sounds: Normal breath sounds. No stridor. No wheezing, rhonchi or rales.   Chest:      Chest wall: No tenderness.   Abdominal:      General: Abdomen is flat.      Palpations: Abdomen is soft. There is no mass.      Tenderness: There is no abdominal tenderness.   Musculoskeletal:      Right lower leg: No edema.      Left lower leg: No edema.   Skin:     General: Skin is warm and dry.   Neurological:      General: No focal deficit present.      Mental Status: She is alert. Mental status is at baseline.   Psychiatric:         Behavior: Behavior normal.         Thought Content: Thought content normal.         Judgment: Judgment normal.            Result Review :   The following data was reviewed by: Guy Beatty MD on 12/15/2023:  Common labs          4/5/2023    09:39   Common Labs   Glucose 327    BUN 18    Creatinine 1.21    Sodium 136    Potassium 4.7    Chloride 100    Calcium 9.8    Albumin 4.3    Total Bilirubin 0.6    Alkaline Phosphatase 111    AST (SGOT) 15    ALT (SGPT) 15    Total Cholesterol 171    Triglycerides 130    HDL Cholesterol 68    LDL Cholesterol  81    Hemoglobin A1C 8.00             Lab Results   Component Value Date    COVID19 Not Detected 09/30/2021    INR 0.92 (L) 11/23/2020       Procedures        Assessment and Plan    Diagnoses and all  orders for this visit:    1. Dizziness (Primary)    2. Essential hypertension  -     Comprehensive Metabolic Panel    3. Type 2 diabetes mellitus with diabetic neuropathy, with long-term current use of insulin  -     Comprehensive Metabolic Panel  -     Hemoglobin A1c    4. Anxiety and depression  -     sertraline (Zoloft) 25 MG tablet; Take 3 tablets by mouth Daily. Take 75 mg by mouth daily (one 25 mg and one 50 mg).  Dispense: 270 tablet; Refill: 2    5. Encounter for immunization  -     COVID-19 F23 (Pfizer) 12yrs+ (COMIRNATY)    6. Cervicalgia  -     Ambulatory Referral to Physical Therapy Evaluate and treat    7. Other hyperlipidemia  -     atorvastatin (LIPITOR) 10 MG tablet; Take 1 tablet by mouth Daily.  Dispense: 90 tablet; Refill: 0    8. Other specified hypothyroidism  -     TSH      HTN, dizziness:  -orthostatics negative  -Suspect light-headed episodes are orthostatic in nature  -Will cautious continue current BP regimen as Bp appears well controlled, and has previously tolerated this regimen    T2DM:  -blood sugars appears reasonably well controlled.  Will check A1c today    Anxiety and depression:  -will increase zoloft from 50 mg to 75 mg    Immunization:  -Discussed risks/benefits to vaccination, reviewed components of the vaccine, discussed VIS, discussed informed consent, informed consent obtained. Patient/Parent was allowed to accept or refuse vaccine. Questions answered to satisfactory state of patient/parent. We reviewed typical age appropriate and seasonally appropriate vaccinations. Reviewed immunization history and updated state vaccination form as needed. Patient/Parent was counseled on the above vaccines.      Medications Discontinued During This Encounter   Medication Reason    sertraline (Zoloft) 50 MG tablet     sertraline (Zoloft) 25 MG tablet Reorder    atorvastatin (LIPITOR) 10 MG tablet Reorder          Follow Up   Return if symptoms worsen or fail to improve.  Patient was given  instructions and counseling regarding her condition or for health maintenance advice. Please see specific information pulled into the AVS if appropriate.       Guy Beatty MD  12/15/23  12:49 EST

## 2023-12-16 LAB — HBA1C MFR BLD: 8.4 % (ref 4.8–5.6)

## 2023-12-18 DIAGNOSIS — E11.40 TYPE 2 DIABETES MELLITUS WITH DIABETIC NEUROPATHY, WITH LONG-TERM CURRENT USE OF INSULIN: Primary | ICD-10-CM

## 2023-12-18 DIAGNOSIS — E03.8 OTHER SPECIFIED HYPOTHYROIDISM: ICD-10-CM

## 2023-12-18 DIAGNOSIS — Z79.4 TYPE 2 DIABETES MELLITUS WITH DIABETIC NEUROPATHY, WITH LONG-TERM CURRENT USE OF INSULIN: Primary | ICD-10-CM

## 2023-12-18 RX ORDER — SEMAGLUTIDE 1.34 MG/ML
INJECTION, SOLUTION SUBCUTANEOUS
Qty: 3 ML | Refills: 0 | Status: SHIPPED | OUTPATIENT
Start: 2023-12-18

## 2023-12-18 RX ORDER — LIOTHYRONINE SODIUM 5 UG/1
5 TABLET ORAL 2 TIMES DAILY
Qty: 180 TABLET | Refills: 2 | Status: SHIPPED | OUTPATIENT
Start: 2023-12-18

## 2023-12-19 ENCOUNTER — TELEPHONE (OUTPATIENT)
Dept: INTERNAL MEDICINE | Facility: CLINIC | Age: 53
End: 2023-12-19
Payer: COMMERCIAL

## 2023-12-19 RX ORDER — DULOXETIN HYDROCHLORIDE 60 MG/1
60 CAPSULE, DELAYED RELEASE ORAL DAILY
Qty: 90 CAPSULE | Refills: 3 | Status: SHIPPED | OUTPATIENT
Start: 2023-12-19

## 2023-12-19 NOTE — TELEPHONE ENCOUNTER
Caller: Carmelina Saldana    Relationship to patient: Self    Best call back number: [phone]     Patient is needing: PATIENT IS REQUESTING A CALL BACK ABOUT HER LAB RESULTS. PLEASE CALL AND ADVISE.

## 2023-12-20 NOTE — TELEPHONE ENCOUNTER
Caller: Carmelina Saldana    Relationship: Self    Best call back number: [phone]    Caller requesting test results: SELF    What test was performed: BLOOD WORK    When was the test performed: 12.15.2023    Where was the test performed: IN OFFICE    Additional notes: PATIENT HAS QUESTIONS REGARDING HIGH RESULTS SHE HAS SEEN ON MYCHART, WOULD LIKE TO SPEAK WITH CLINICAL STAFF

## 2024-01-03 DIAGNOSIS — E11.40 TYPE 2 DIABETES MELLITUS WITH DIABETIC NEUROPATHY, WITH LONG-TERM CURRENT USE OF INSULIN: ICD-10-CM

## 2024-01-03 DIAGNOSIS — Z79.4 TYPE 2 DIABETES MELLITUS WITH DIABETIC NEUROPATHY, WITH LONG-TERM CURRENT USE OF INSULIN: ICD-10-CM

## 2024-01-03 RX ORDER — GABAPENTIN 300 MG/1
CAPSULE ORAL
Qty: 90 CAPSULE | Refills: 2 | Status: SHIPPED | OUTPATIENT
Start: 2024-01-03

## 2024-01-03 NOTE — TELEPHONE ENCOUNTER
Refill request for controlled substance.      Date of request: 1/3/2024   Medication requested: Gabapentin  Last OV: 12/15/23  Last UDS: 10/13/22  Contract signed: yes    Date:10/13/22  Next office visit: 2/1/24    Corinne Degroot

## 2024-01-08 ENCOUNTER — TELEPHONE (OUTPATIENT)
Dept: INTERNAL MEDICINE | Facility: CLINIC | Age: 54
End: 2024-01-08
Payer: COMMERCIAL

## 2024-01-08 NOTE — TELEPHONE ENCOUNTER
Caller: ABDULKADIR CAPONE    Relationship to patient: Mother    Best call back number: [phone]    Patient is needing: PATIENT'S MOTHER CALLED IN AND SAID PATIENT RECENTLY LOST HER SPOUSE AND IS REQUESTING A MEDICATION FOR HER NERVES.           [id number] [place] [facility] [address] AT [facility] - [phone] [facility] [phone]  [phone]

## 2024-01-09 NOTE — TELEPHONE ENCOUNTER
Hub staff attempted to follow warm transfer process and was unsuccessful     Caller: Carmelina Saldana    Relationship to patient: Self    Best call back number:     [phone]       Patient is needing: PATIENT STATES THAT SHE NEEDS THIS MEDICATION TODAY. SHE STATES THAT SHE IS HAVING A LOT OF TROUBLE SLEEPING AND GETTING THROUGH THIS WEEK.

## 2024-01-10 ENCOUNTER — TELEPHONE (OUTPATIENT)
Dept: INTERNAL MEDICINE | Facility: CLINIC | Age: 54
End: 2024-01-10
Payer: COMMERCIAL

## 2024-01-10 NOTE — TELEPHONE ENCOUNTER
Yumiko spoke with patient's son today.    Please see following encounter:    Telephone with Guy Beatty MD (01/10/2024)

## 2024-01-10 NOTE — TELEPHONE ENCOUNTER
Son of patient called asking about any anxiety meds to help her get through this week as she just lost her . [place] told me to ask if they could be seen tomorrow at 11, they could not because  was at 12. The son was not happy about this and hung up on me.

## 2024-01-17 ENCOUNTER — OFFICE VISIT (OUTPATIENT)
Dept: INTERNAL MEDICINE | Facility: CLINIC | Age: 54
End: 2024-01-17
Payer: COMMERCIAL

## 2024-01-17 VITALS
HEIGHT: 64 IN | BODY MASS INDEX: 41.69 KG/M2 | HEART RATE: 99 BPM | OXYGEN SATURATION: 98 % | DIASTOLIC BLOOD PRESSURE: 106 MMHG | WEIGHT: 244.2 LBS | SYSTOLIC BLOOD PRESSURE: 162 MMHG | TEMPERATURE: 98.2 F

## 2024-01-17 DIAGNOSIS — F43.21 GRIEF REACTION: ICD-10-CM

## 2024-01-17 DIAGNOSIS — R03.0 ELEVATED BLOOD PRESSURE READING: ICD-10-CM

## 2024-01-17 DIAGNOSIS — F41.9 ANXIETY AND DEPRESSION: ICD-10-CM

## 2024-01-17 DIAGNOSIS — F32.A ANXIETY AND DEPRESSION: ICD-10-CM

## 2024-01-17 DIAGNOSIS — Z63.4 BEREAVEMENT: Primary | ICD-10-CM

## 2024-01-17 RX ORDER — HYDROXYZINE HYDROCHLORIDE 25 MG/1
25 TABLET, FILM COATED ORAL 3 TIMES DAILY PRN
Qty: 90 TABLET | Refills: 0 | Status: SHIPPED | OUTPATIENT
Start: 2024-01-17

## 2024-01-17 SDOH — SOCIAL STABILITY - SOCIAL INSECURITY: DISSAPEARANCE AND DEATH OF FAMILY MEMBER: Z63.4

## 2024-01-17 NOTE — PROGRESS NOTES
Chief Complaint  Anxiety and Insomnia    Subjective          Carmelina Saldana is a 54 year old female that presents to [facility] with c/o anxiety and depression. She states that her  passed away on 1/7/23 unexpectedly at home due to allergic reaction. Unsure of what the cause was. She states she can't sleep, she cries all the time, she is eating only a small amount (usually 1 meal per day). She states she is having nightmares and sleeps a couple of hours a night intermittently. She is staying with her mom currently.    She has been on zoloft but off of cymbalta for a few months. Zoloft was increased to 75mg and has not helped. She is starting [employer] to occupy time. She doesn't have other hobbies due to her vision problems.   She is not currently seeing a therapist but does want to go.   She denies SI/HI. She is not currently working.        History of Present Illness      Current Outpatient Medications   Medication Instructions    amLODIPine-benazepril (Lotrel) 10-40 MG per capsule 1 capsule, Oral, Daily    atorvastatin (LIPITOR) 10 mg, Oral, Daily    chlorthalidone (HYGROTON) 25 MG tablet TAKE 1 TABLET BY MOUTH EVERY DAY    DULoxetine (CYMBALTA) 60 mg, Oral, Daily    Eliquis 5 mg, Oral, 2 Times Daily    gabapentin (NEURONTIN) 300 MG capsule TAKE 1 CAPSULE BY MOUTH THREE TIMES DAILY    hydrOXYzine (ATARAX) 25 mg, Oral, 3 Times Daily PRN    Insulin Aspart FlexPen 100 UNIT/ML solution pen-injector INJECT 5 UNITS UNDER THE SKIN THREE TIMES DAILY PRIOR TO MEALS, WITH ADDITIONAL SLIDING SCALE AS DIRECTED; MAX UNITS PER DAY IS 60    ketorolac (ACULAR) 0.5 % ophthalmic solution instill 1 drop by ophthalmic route  every day in left eye    levothyroxine (SYNTHROID, LEVOTHROID) 200 mcg, Oral, Daily    liothyronine (CYTOMEL) 5 mcg, Oral, 2 Times Daily    metFORMIN ER (GLUCOPHAGE-XR) 500 MG 24 hr tablet TAKE 1 TABLET BY MOUTH EVERY DAY WITH BREAKFAST    pantoprazole  "(PROTONIX) 40 MG EC tablet TAKE 1 TABLET BY MOUTH EVERY DAY    prednisoLONE acetate (PRED FORTE) 1 % ophthalmic suspension     rOPINIRole (REQUIP) 0.25 mg, Oral, Nightly    Semaglutide, 1 MG/DOSE, (Ozempic, 1 MG/DOSE,) 4 MG/3ML solution pen-injector 1 mg subcutaneous once weekly for week 9-12.    sertraline (ZOLOFT) 75 mg, Oral, Daily, Take 75 mg by mouth daily (one 25 mg and one 50 mg).    Tresiba FlexTouch 34 Units, Subcutaneous, Daily    Vigamox 0.5 % ophthalmic solution        The following portions of the patient's history were reviewed and updated as appropriate: allergies, current medications, past family history, past medical history, past social history, past surgical history, and problem list.    Objective   Vital Signs:   BP (!) 162/106 (BP Location: Right arm, Patient Position: Sitting, Cuff Size: Adult)   Pulse 99   Temp 98.2 °F (36.8 °C) (Temporal)   Ht 162.6 cm (64\")   Wt 111 kg (244 lb 3.2 oz)   SpO2 98%   BMI 41.92 kg/m²     BP Readings from Last 3 Encounters:   01/17/24 (!) 162/106   12/15/23 121/77   10/06/23 109/74     Wt Readings from Last 3 Encounters:   01/17/24 111 kg (244 lb 3.2 oz)   12/15/23 112 kg (246 lb)   10/06/23 110 kg (243 lb)           Physical Exam  Vitals reviewed.   Constitutional:       Appearance: Normal appearance. She is well-developed. She is not ill-appearing.   HENT:      Head: Normocephalic and atraumatic.   Eyes:      Conjunctiva/sclera: Conjunctivae normal.      Pupils: Pupils are equal, round, and reactive to light.   Cardiovascular:      Rate and Rhythm: Normal rate and regular rhythm.   Pulmonary:      Effort: Pulmonary effort is normal.      Breath sounds: Normal breath sounds.   Skin:     General: Skin is warm and dry.   Neurological:      Mental Status: She is alert and oriented to person, place, and time.   Psychiatric:         Attention and Perception: Attention normal.         Mood and Affect: Mood is depressed. Affect is tearful.         Speech: Speech " normal.         Behavior: Behavior normal.              Result Review :   The following data was reviewed by: FERNANDO Smith on 01/17/2024:           Lab Results   Component Value Date    COVID19 Not Detected 09/30/2021    INR 0.92 (L) 11/23/2020       Procedures        Assessment and Plan    Diagnoses and all orders for this visit:    1. Bereavement (Primary)  -     Ambulatory Referral to Behavioral Health    2. Grief reaction  -     Ambulatory Referral to Behavioral Health    3. Anxiety and depression  -     hydrOXYzine (ATARAX) 25 MG tablet; Take 1 tablet by mouth 3 (Three) Times a Day As Needed for Anxiety.  Dispense: 90 tablet; Refill: 0  -     Ambulatory Referral to Behavioral Health    4. Elevated blood pressure reading      Advised to monitor blood pressure and keep a log to bring in at her next appointment. Today's reading is likely situational.     There are no discontinued medications.       Follow Up   No follow-ups on file.  Patient was given instructions and counseling regarding her condition or for health maintenance advice. Please see specific information pulled into the AVS if appropriate.       FERNANDO Smith  01/17/24  13:24 EST

## 2024-02-19 RX ORDER — APIXABAN 5 MG/1
5 TABLET, FILM COATED ORAL 2 TIMES DAILY
Qty: 60 TABLET | Refills: 2 | Status: SHIPPED | OUTPATIENT
Start: 2024-02-19

## 2024-02-20 DIAGNOSIS — Z79.4 TYPE 2 DIABETES MELLITUS WITH DIABETIC NEUROPATHY, WITH LONG-TERM CURRENT USE OF INSULIN: ICD-10-CM

## 2024-02-20 DIAGNOSIS — E11.40 TYPE 2 DIABETES MELLITUS WITH DIABETIC NEUROPATHY, WITH LONG-TERM CURRENT USE OF INSULIN: ICD-10-CM

## 2024-02-21 RX ORDER — SEMAGLUTIDE 1.34 MG/ML
INJECTION, SOLUTION SUBCUTANEOUS
Qty: 3 ML | Refills: 2 | Status: SHIPPED | OUTPATIENT
Start: 2024-02-21

## 2024-02-28 DIAGNOSIS — F32.A ANXIETY AND DEPRESSION: ICD-10-CM

## 2024-02-28 DIAGNOSIS — F41.9 ANXIETY AND DEPRESSION: ICD-10-CM

## 2024-02-28 RX ORDER — HYDROXYZINE HYDROCHLORIDE 25 MG/1
25 TABLET, FILM COATED ORAL 3 TIMES DAILY PRN
Qty: 90 TABLET | Refills: 0 | Status: SHIPPED | OUTPATIENT
Start: 2024-02-28

## 2024-02-28 NOTE — TELEPHONE ENCOUNTER
Rx Refill Note  Requested Prescriptions     Signed Prescriptions Disp Refills    hydrOXYzine (ATARAX) 25 MG tablet 90 tablet 0     Sig: Take 1 tablet by mouth 3 (Three) Times a Day As Needed for Anxiety.     Authorizing Provider: PEDRO VILLALTA     Ordering User: DALLAS MIRZA      Last office visit with prescribing clinician: 12/15/2023   Last telemedicine visit with prescribing clinician: Visit date not found   Next office visit with prescribing clinician: Visit date not found                         Would you like a call back once the refill request has been completed: [] Yes [] No    If the office needs to give you a call back, can they leave a voicemail: [] Yes [] No    Dallas Mirza  02/28/24, 10:14 EST  
Resulted

## 2024-03-14 ENCOUNTER — OFFICE VISIT (OUTPATIENT)
Dept: INTERNAL MEDICINE | Facility: CLINIC | Age: 54
End: 2024-03-14
Payer: COMMERCIAL

## 2024-03-14 VITALS
BODY MASS INDEX: 39.09 KG/M2 | OXYGEN SATURATION: 95 % | HEART RATE: 111 BPM | DIASTOLIC BLOOD PRESSURE: 85 MMHG | WEIGHT: 229 LBS | HEIGHT: 64 IN | SYSTOLIC BLOOD PRESSURE: 138 MMHG | TEMPERATURE: 96.7 F

## 2024-03-14 DIAGNOSIS — E03.8 OTHER SPECIFIED HYPOTHYROIDISM: ICD-10-CM

## 2024-03-14 DIAGNOSIS — R10.13 EPIGASTRIC PAIN: Primary | ICD-10-CM

## 2024-03-14 LAB
ALBUMIN SERPL-MCNC: 4.1 G/DL (ref 3.5–5.2)
ALBUMIN/GLOB SERPL: 1.2 G/DL
ALP SERPL-CCNC: 123 U/L (ref 39–117)
ALT SERPL W P-5'-P-CCNC: 34 U/L (ref 1–33)
ANION GAP SERPL CALCULATED.3IONS-SCNC: 11.9 MMOL/L (ref 5–15)
AST SERPL-CCNC: 30 U/L (ref 1–32)
BILIRUB SERPL-MCNC: 0.5 MG/DL (ref 0–1.2)
BUN SERPL-MCNC: 24 MG/DL (ref 6–20)
BUN/CREAT SERPL: 17.1 (ref 7–25)
CALCIUM SPEC-SCNC: 9.3 MG/DL (ref 8.6–10.5)
CHLORIDE SERPL-SCNC: 97 MMOL/L (ref 98–107)
CO2 SERPL-SCNC: 27.1 MMOL/L (ref 22–29)
CREAT SERPL-MCNC: 1.4 MG/DL (ref 0.57–1)
EGFRCR SERPLBLD CKD-EPI 2021: 44.5 ML/MIN/1.73
GLOBULIN UR ELPH-MCNC: 3.3 GM/DL
GLUCOSE SERPL-MCNC: 186 MG/DL (ref 65–99)
POTASSIUM SERPL-SCNC: 4.6 MMOL/L (ref 3.5–5.2)
PROT SERPL-MCNC: 7.4 G/DL (ref 6–8.5)
SODIUM SERPL-SCNC: 136 MMOL/L (ref 136–145)
TSH SERPL DL<=0.05 MIU/L-ACNC: 0.83 UIU/ML (ref 0.27–4.2)

## 2024-03-14 PROCEDURE — 3075F SYST BP GE 130 - 139MM HG: CPT | Performed by: NURSE PRACTITIONER

## 2024-03-14 PROCEDURE — 1160F RVW MEDS BY RX/DR IN RCRD: CPT | Performed by: NURSE PRACTITIONER

## 2024-03-14 PROCEDURE — 1159F MED LIST DOCD IN RCRD: CPT | Performed by: NURSE PRACTITIONER

## 2024-03-14 PROCEDURE — 3079F DIAST BP 80-89 MM HG: CPT | Performed by: NURSE PRACTITIONER

## 2024-03-14 PROCEDURE — 99214 OFFICE O/P EST MOD 30 MIN: CPT | Performed by: NURSE PRACTITIONER

## 2024-03-14 PROCEDURE — 80050 GENERAL HEALTH PANEL: CPT | Performed by: STUDENT IN AN ORGANIZED HEALTH CARE EDUCATION/TRAINING PROGRAM

## 2024-03-14 RX ORDER — SUCRALFATE 1 G/1
1 TABLET ORAL 4 TIMES DAILY
Qty: 120 TABLET | Refills: 0 | Status: SHIPPED | OUTPATIENT
Start: 2024-03-14

## 2024-03-14 NOTE — PROGRESS NOTES
Chief Complaint  Abdominal Pain    Subjective      Carmelina Saldana is a 54 year old female that presents to [facility] with c/o abdominal pain that radiates into her back.  Pain is constant. Worse if lying on right side or after eating. Doesn't matter what meal.  She has had some acid reflux as well. Taking protonix daily. She denies fever, diarrhea, constipation, hematochezia, melena, nausea or vomiting.             History of Present Illness    Current Outpatient Medications   Medication Instructions    amLODIPine-benazepril (Lotrel) 10-40 MG per capsule 1 capsule, Oral, Daily    atorvastatin (LIPITOR) 10 mg, Oral, Daily    chlorthalidone (HYGROTON) 25 MG tablet TAKE 1 TABLET BY MOUTH EVERY DAY    DULoxetine (CYMBALTA) 60 mg, Oral, Daily    Eliquis 5 mg, Oral, 2 Times Daily    gabapentin (NEURONTIN) 300 MG capsule TAKE 1 CAPSULE BY MOUTH THREE TIMES DAILY    hydrOXYzine (ATARAX) 25 mg, Oral, 3 Times Daily PRN    Insulin Aspart FlexPen 100 UNIT/ML solution pen-injector INJECT 5 UNITS UNDER THE SKIN THREE TIMES DAILY PRIOR TO MEALS, WITH ADDITIONAL SLIDING SCALE AS DIRECTED; MAX UNITS PER DAY IS 60    levothyroxine (SYNTHROID, LEVOTHROID) 200 mcg, Oral, Daily    liothyronine (CYTOMEL) 5 mcg, Oral, 2 Times Daily    metFORMIN ER (GLUCOPHAGE-XR) 500 MG 24 hr tablet TAKE 1 TABLET BY MOUTH EVERY DAY WITH BREAKFAST    pantoprazole (PROTONIX) 40 MG EC tablet TAKE 1 TABLET BY MOUTH EVERY DAY    rOPINIRole (REQUIP) 0.25 mg, Oral, Nightly    Semaglutide, 1 MG/DOSE, (Ozempic, 1 MG/DOSE,) 4 MG/3ML solution pen-injector INJECT 1MG SUBCUTANEOUS ONCE WEEKLY FOR 9-12    sertraline (ZOLOFT) 75 mg, Oral, Daily, Take 75 mg by mouth daily (one 25 mg and one 50 mg).    sucralfate (CARAFATE) 1 g, Oral, 4 Times Daily    Tresiba FlexTouch 34 Units, Subcutaneous, Daily       The following portions of the patient's history were reviewed and updated as appropriate: allergies, current  "medications, past family history, past medical history, past social history, past surgical history, and problem list.    Objective   Vital Signs:   /85 (BP Location: Left arm, Patient Position: Sitting)   Pulse 111   Temp 96.7 °F (35.9 °C) (Temporal)   Ht 162.6 cm (64\")   Wt 104 kg (229 lb)   SpO2 95%   BMI 39.31 kg/m²     Wt Readings from Last 3 Encounters:   03/14/24 104 kg (229 lb)   01/17/24 111 kg (244 lb 3.2 oz)   12/15/23 112 kg (246 lb)     BP Readings from Last 3 Encounters:   03/14/24 138/85   01/17/24 (!) 162/106   12/15/23 121/77     Physical Exam  Vitals and nursing note reviewed.   Constitutional:       Appearance: Normal appearance. She is obese. She is not ill-appearing.   HENT:      Head: Normocephalic and atraumatic.   Pulmonary:      Effort: Pulmonary effort is normal.      Breath sounds: Normal breath sounds.   Abdominal:      General: Bowel sounds are normal.      Palpations: Abdomen is soft.      Tenderness: There is abdominal tenderness in the epigastric area. There is no guarding.   Skin:     General: Skin is warm and dry.   Neurological:      Mental Status: She is alert and oriented to person, place, and time.   Psychiatric:         Mood and Affect: Mood normal.         Behavior: Behavior normal.          Result Review :  The following data was reviewed by: FERNANDO Smith on 03/14/2024:      Common labs          4/5/2023    09:39 12/15/2023    12:18   Common Labs   Glucose 327  213    BUN 18  25    Creatinine 1.21  1.26    Sodium 136  137    Potassium 4.7  4.5    Chloride 100  99    Calcium 9.8  9.9    Albumin 4.3  4.4    Total Bilirubin 0.6  0.4    Alkaline Phosphatase 111  116    AST (SGOT) 15  23    ALT (SGPT) 15  17    Total Cholesterol 171     Triglycerides 130     HDL Cholesterol 68     LDL Cholesterol  81     Hemoglobin A1C 8.00  8.40        Lab Results (last 72 hours)       Procedure Component Value Units Date/Time    TSH [[id number]] Collected: 03/14/24 1600    " Specimen: Blood from Arm, Right Updated: 03/14/24 1600    CBC w AUTO Differential [[id number]] Collected: 03/14/24 1600    Specimen: Blood from Arm, Right Updated: 03/14/24 1600    Narrative:      The following orders were created for panel order CBC w AUTO Differential.  Procedure                               Abnormality         Status                     ---------                               -----------         ------                     CBC Auto Differential[[id number]]                            In process                   Please view results for these tests on the individual orders.    Comprehensive metabolic panel [[id number]] Collected: 03/14/24 1600    Specimen: Blood from Arm, Right Updated: 03/14/24 1600    CBC Auto Differential [[id number]] Collected: 03/14/24 1600    Specimen: Blood from Arm, Right Updated: 03/14/24 1600             No Images in the past 120 days found..    Lab Results   Component Value Date    COVID19 Not Detected 09/30/2021    INR 0.92 (L) 11/23/2020    POCGLU 180 (H) 09/28/2022       Procedures        Assessment and Plan   Diagnoses and all orders for this visit:    1. Epigastric pain (Primary)  -     CBC w AUTO Differential; Future  -     Comprehensive metabolic panel; Future  -     sucralfate (Carafate) 1 g tablet; Take 1 tablet by mouth 4 (Four) Times a Day.  Dispense: 120 tablet; Refill: 0  -     CT Abdomen Pelvis Without Contrast; Future  -     CBC w AUTO Differential  -     Comprehensive metabolic panel    2. Other specified hypothyroidism  -     TSH         Medications Discontinued During This Encounter   Medication Reason    ketorolac (ACULAR) 0.5 % ophthalmic solution *Therapy completed    prednisoLONE acetate (PRED FORTE) 1 % ophthalmic suspension *Therapy completed    Vigamox 0.5 % ophthalmic solution *Therapy completed          Follow Up   No follow-ups on file.  Patient was given instructions and counseling regarding her condition or for health maintenance advice. Please  see specific information pulled into the AVS if appropriate.     Should you develop new or worsening symptoms including but not limited to change in pain, intractable nausea/vomiting, blood in stool, chest pain, fever, etc., please go directly to the ER.    FERNANDO Smith  03/14/24  19:35 EDT

## 2024-03-14 NOTE — PATIENT INSTRUCTIONS
Should you develop new or worsening symptoms including but not limited to change in pain, intractable nausea/vomiting, blood in stool, chest pain, fever, etc., please go directly to the ER.

## 2024-03-15 LAB
BASOPHILS # BLD AUTO: 0.06 10*3/MM3 (ref 0–0.2)
BASOPHILS NFR BLD AUTO: 0.5 % (ref 0–1.5)
DEPRECATED RDW RBC AUTO: 41.6 FL (ref 37–54)
EOSINOPHIL # BLD AUTO: 0.1 10*3/MM3 (ref 0–0.4)
EOSINOPHIL NFR BLD AUTO: 0.8 % (ref 0.3–6.2)
ERYTHROCYTE [DISTWIDTH] IN BLOOD BY AUTOMATED COUNT: 12.9 % (ref 12.3–15.4)
HCT VFR BLD AUTO: 45.3 % (ref 34–46.6)
HGB BLD-MCNC: 15 G/DL (ref 12–15.9)
IMM GRANULOCYTES # BLD AUTO: 0.05 10*3/MM3 (ref 0–0.05)
IMM GRANULOCYTES NFR BLD AUTO: 0.4 % (ref 0–0.5)
LYMPHOCYTES # BLD AUTO: 1.21 10*3/MM3 (ref 0.7–3.1)
LYMPHOCYTES NFR BLD AUTO: 9.9 % (ref 19.6–45.3)
MCH RBC QN AUTO: 29.8 PG (ref 26.6–33)
MCHC RBC AUTO-ENTMCNC: 33.1 G/DL (ref 31.5–35.7)
MCV RBC AUTO: 90.1 FL (ref 79–97)
MONOCYTES # BLD AUTO: 0.56 10*3/MM3 (ref 0.1–0.9)
MONOCYTES NFR BLD AUTO: 4.6 % (ref 5–12)
NEUTROPHILS NFR BLD AUTO: 10.19 10*3/MM3 (ref 1.7–7)
NEUTROPHILS NFR BLD AUTO: 83.8 % (ref 42.7–76)
NRBC BLD AUTO-RTO: 0 /100 WBC (ref 0–0.2)
PLATELET # BLD AUTO: 293 10*3/MM3 (ref 140–450)
PMV BLD AUTO: 9.9 FL (ref 6–12)
RBC # BLD AUTO: 5.03 10*6/MM3 (ref 3.77–5.28)
WBC NRBC COR # BLD AUTO: 12.17 10*3/MM3 (ref 3.4–10.8)

## 2024-03-25 ENCOUNTER — PRIOR AUTHORIZATION (OUTPATIENT)
Dept: INTERNAL MEDICINE | Facility: CLINIC | Age: 54
End: 2024-03-25

## 2024-03-25 NOTE — TELEPHONE ENCOUNTER
PA REQUIRED:  insulin degludec (Tresiba FlexTouch) 100 UNIT/ML solution pen-injector injection (12/30/2022)   INDEXED VIA ONBASE

## 2024-03-26 NOTE — TELEPHONE ENCOUNTER
Attempted to complete PA- patient does not appear to have any coverage.    Sending message to patient in regards to this.

## 2024-04-11 DIAGNOSIS — R10.13 EPIGASTRIC PAIN: ICD-10-CM

## 2024-04-11 RX ORDER — SUCRALFATE 1 G/1
1 TABLET ORAL 4 TIMES DAILY
Qty: 120 TABLET | Refills: 0 | Status: SHIPPED | OUTPATIENT
Start: 2024-04-11

## 2024-05-16 DIAGNOSIS — F32.A ANXIETY AND DEPRESSION: ICD-10-CM

## 2024-05-16 DIAGNOSIS — F41.9 ANXIETY AND DEPRESSION: ICD-10-CM

## 2024-05-16 RX ORDER — HYDROXYZINE HYDROCHLORIDE 25 MG/1
25 TABLET, FILM COATED ORAL 3 TIMES DAILY PRN
Qty: 90 TABLET | Refills: 0 | Status: SHIPPED | OUTPATIENT
Start: 2024-05-16

## 2024-05-20 DIAGNOSIS — E03.8 OTHER SPECIFIED HYPOTHYROIDISM: ICD-10-CM

## 2024-05-20 RX ORDER — LEVOTHYROXINE SODIUM 0.2 MG/1
200 TABLET ORAL DAILY
Qty: 90 TABLET | Refills: 2 | Status: SHIPPED | OUTPATIENT
Start: 2024-05-20

## 2024-06-07 ENCOUNTER — TELEPHONE (OUTPATIENT)
Dept: INTERNAL MEDICINE | Facility: CLINIC | Age: 54
End: 2024-06-07
Payer: COMMERCIAL

## 2024-06-07 DIAGNOSIS — I10 ESSENTIAL HYPERTENSION: ICD-10-CM

## 2024-06-07 RX ORDER — AMLODIPINE AND BENAZEPRIL HYDROCHLORIDE 10; 40 MG/1; MG/1
1 CAPSULE ORAL DAILY
Qty: 30 CAPSULE | Refills: 11 | Status: SHIPPED | OUTPATIENT
Start: 2024-06-07 | End: 2025-06-07

## 2024-06-07 RX ORDER — DULOXETIN HYDROCHLORIDE 60 MG/1
60 CAPSULE, DELAYED RELEASE ORAL DAILY
Qty: 90 CAPSULE | Refills: 3 | Status: SHIPPED | OUTPATIENT
Start: 2024-06-07

## 2024-06-07 NOTE — TELEPHONE ENCOUNTER
The medicine appears active on our end when I check it.  If you see something different, of course, please let me know.    I also don't know which pharmacy she went to.  Perhaps it was sent to a pharmacy other than her preferred pharmacy?    Please ask Miss Saldana which pharmacy, so that it can be re-sent.  Please confirm receipt of the prescription.

## 2024-06-07 NOTE — TELEPHONE ENCOUNTER
Spoke with patient, she had called [facility]. [facility] why [facility] did not have this prescription.    Sending refill to [facility].    No further questions or concerns noted.

## 2024-06-07 NOTE — TELEPHONE ENCOUNTER
Patient called stating that she went to  her refill on cymbalta and the pharmacy told her they could not fill this due to Dr. Echavarria discontinuing this med. Is patient supposed to continue this med or stop taking it?

## 2024-07-01 RX ORDER — INSULIN DEGLUDEC 100 U/ML
34 INJECTION, SOLUTION SUBCUTANEOUS DAILY
Qty: 15 ML | Refills: 0 | Status: SHIPPED | OUTPATIENT
Start: 2024-07-01 | End: 2024-07-03

## 2024-07-01 NOTE — TELEPHONE ENCOUNTER
Rx Refill Note  Requested Prescriptions      No prescriptions requested or ordered in this encounter      Last office visit with prescribing clinician: 12/15/2023   Last telemedicine visit with prescribing clinician: Visit date not found   Next office visit with prescribing clinician: Visit date not found                         Would you like a call back once the refill request has been completed: [] Yes [] No    If the office needs to give you a call back, can they leave a voicemail: [] Yes [] No    Logan Persaud  07/01/24, 15:48 EDT

## 2024-07-02 ENCOUNTER — TELEPHONE (OUTPATIENT)
Dept: INTERNAL MEDICINE | Facility: CLINIC | Age: 54
End: 2024-07-02
Payer: COMMERCIAL

## 2024-07-02 DIAGNOSIS — E11.40 TYPE 2 DIABETES MELLITUS WITH DIABETIC NEUROPATHY, WITH LONG-TERM CURRENT USE OF INSULIN: Primary | ICD-10-CM

## 2024-07-02 DIAGNOSIS — Z79.4 TYPE 2 DIABETES MELLITUS WITH DIABETIC NEUROPATHY, WITH LONG-TERM CURRENT USE OF INSULIN: Primary | ICD-10-CM

## 2024-07-02 NOTE — TELEPHONE ENCOUNTER
Caller: Carmelina Saldana    Relationship: Self    Best call back number: [phone]     What is the best time to reach you: ANY    Who are you requesting to speak with (clinical staff, provider,  specific staff member): CLINICAL STAFF    What was the call regarding: PATIENT CALLED STATING THAT HER INSURANCE WILL NOT COVER HER TRESIBA AND NEEDS AN ALTERNATIVE CALLED IN.    HER INSURANCE IS ALSO NO LONGER GOING TO COVER HER GLUCOSE MONITOR AND SHE WILL NEED A NEW ONE WITH TEST STRIPS AND LANCETS CALLED IN

## 2024-07-03 DIAGNOSIS — E11.40 TYPE 2 DIABETES MELLITUS WITH DIABETIC NEUROPATHY, WITH LONG-TERM CURRENT USE OF INSULIN: ICD-10-CM

## 2024-07-03 DIAGNOSIS — Z79.4 TYPE 2 DIABETES MELLITUS WITH DIABETIC NEUROPATHY, WITH LONG-TERM CURRENT USE OF INSULIN: ICD-10-CM

## 2024-07-03 RX ORDER — LANCETS
EACH MISCELLANEOUS
Qty: 100 EACH | Refills: 1 | Status: SHIPPED | OUTPATIENT
Start: 2024-07-03

## 2024-07-03 NOTE — TELEPHONE ENCOUNTER
Caller: Carmelina Saldana    Relationship: Self    Best call back number: [phone]     What medication are you requesting: SOMETHING DIFFERENT THAN insulin degludec (Tresiba FlexTouch) 100 UNIT/ML solution pen-injector injection BECAUSE INSURANCE DOESN'T PAY FOR THIS ANYMORE    AND ANOTHER BLOOD GLUCOSE MONITOR    What are your current symptoms: DIABETES    How long have you been experiencing symptoms:     Have you had these symptoms before:    [] Yes  [] No    Have you been treated for these symptoms before:   [] Yes  [] No    If a prescription is needed, what is your preferred pharmacy and phone number: [id number] - [place], [place] - [address] AT [facility] [phone] [facility] [phone] FX     Additional notes:

## 2024-07-05 NOTE — TELEPHONE ENCOUNTER
Last Office Visit:   Next Office Visit:   Last Date Prescribed:  Last Urine Drug Screen:   Date of Signed Controlled Contract:

## 2024-07-08 RX ORDER — APIXABAN 5 MG/1
5 TABLET, FILM COATED ORAL 2 TIMES DAILY
Qty: 60 TABLET | Refills: 2 | Status: SHIPPED | OUTPATIENT
Start: 2024-07-08

## 2024-07-13 ENCOUNTER — HOSPITAL ENCOUNTER (EMERGENCY)
Facility: HOSPITAL | Age: 54
Discharge: HOME | End: 2024-07-13
Attending: EMERGENCY MEDICINE
Payer: COMMERCIAL

## 2024-07-13 ENCOUNTER — APPOINTMENT (OUTPATIENT)
Dept: GENERAL RADIOLOGY | Facility: HOSPITAL | Age: 54
End: 2024-07-13
Payer: COMMERCIAL

## 2024-07-13 ENCOUNTER — APPOINTMENT (OUTPATIENT)
Dept: CT IMAGING | Facility: HOSPITAL | Age: 54
End: 2024-07-13
Payer: COMMERCIAL

## 2024-07-13 VITALS
BODY MASS INDEX: 38.69 KG/M2 | HEART RATE: 106 BPM | RESPIRATION RATE: 19 BRPM | WEIGHT: 226.63 LBS | OXYGEN SATURATION: 95 % | DIASTOLIC BLOOD PRESSURE: 84 MMHG | TEMPERATURE: 98.1 F | HEIGHT: 64 IN | SYSTOLIC BLOOD PRESSURE: 151 MMHG

## 2024-07-13 DIAGNOSIS — R06.02 SHORTNESS OF BREATH: Primary | ICD-10-CM

## 2024-07-13 LAB
ALBUMIN SERPL-MCNC: 4.3 G/DL (ref 3.5–5.2)
ALBUMIN/GLOB SERPL: 1.3 G/DL
ALP SERPL-CCNC: 122 U/L (ref 39–117)
ALT SERPL W P-5'-P-CCNC: 21 U/L (ref 1–33)
ANION GAP SERPL CALCULATED.3IONS-SCNC: 13.1 MMOL/L (ref 5–15)
AST SERPL-CCNC: 20 U/L (ref 1–32)
BASOPHILS # BLD AUTO: 0.09 10*3/MM3 (ref 0–0.2)
BASOPHILS NFR BLD AUTO: 0.7 % (ref 0–1.5)
BILIRUB SERPL-MCNC: 0.7 MG/DL (ref 0–1.2)
BUN SERPL-MCNC: 21 MG/DL (ref 6–20)
BUN/CREAT SERPL: 15.6 (ref 7–25)
CALCIUM SPEC-SCNC: 9.6 MG/DL (ref 8.6–10.5)
CHLORIDE SERPL-SCNC: 94 MMOL/L (ref 98–107)
CO2 SERPL-SCNC: 27.9 MMOL/L (ref 22–29)
CREAT SERPL-MCNC: 1.35 MG/DL (ref 0.57–1)
DEPRECATED RDW RBC AUTO: 42.9 FL (ref 37–54)
EGFRCR SERPLBLD CKD-EPI 2021: 46.5 ML/MIN/1.73
EOSINOPHIL # BLD AUTO: 0.03 10*3/MM3 (ref 0–0.4)
EOSINOPHIL NFR BLD AUTO: 0.2 % (ref 0.3–6.2)
ERYTHROCYTE [DISTWIDTH] IN BLOOD BY AUTOMATED COUNT: 13.9 % (ref 12.3–15.4)
FLUAV SUBTYP SPEC NAA+PROBE: NOT DETECTED
FLUBV RNA ISLT QL NAA+PROBE: NOT DETECTED
GLOBULIN UR ELPH-MCNC: 3.2 GM/DL
GLUCOSE SERPL-MCNC: 303 MG/DL (ref 65–99)
HCT VFR BLD AUTO: 44.7 % (ref 34–46.6)
HGB BLD-MCNC: 15.8 G/DL (ref 12–15.9)
HOLD SPECIMEN: NORMAL
HOLD SPECIMEN: NORMAL
IMM GRANULOCYTES # BLD AUTO: 0.06 10*3/MM3 (ref 0–0.05)
IMM GRANULOCYTES NFR BLD AUTO: 0.4 % (ref 0–0.5)
LYMPHOCYTES # BLD AUTO: 2.42 10*3/MM3 (ref 0.7–3.1)
LYMPHOCYTES NFR BLD AUTO: 17.5 % (ref 19.6–45.3)
MCH RBC QN AUTO: 30 PG (ref 26.6–33)
MCHC RBC AUTO-ENTMCNC: 35.3 G/DL (ref 31.5–35.7)
MCV RBC AUTO: 84.8 FL (ref 79–97)
MONOCYTES # BLD AUTO: 0.57 10*3/MM3 (ref 0.1–0.9)
MONOCYTES NFR BLD AUTO: 4.1 % (ref 5–12)
NEUTROPHILS NFR BLD AUTO: 10.64 10*3/MM3 (ref 1.7–7)
NEUTROPHILS NFR BLD AUTO: 77.1 % (ref 42.7–76)
NRBC BLD AUTO-RTO: 0 /100 WBC (ref 0–0.2)
NT-PROBNP SERPL-MCNC: 130.6 PG/ML (ref 0–900)
PLATELET # BLD AUTO: 322 10*3/MM3 (ref 140–450)
PMV BLD AUTO: 9.3 FL (ref 6–12)
POTASSIUM SERPL-SCNC: 3.6 MMOL/L (ref 3.5–5.2)
PROT SERPL-MCNC: 7.5 G/DL (ref 6–8.5)
RBC # BLD AUTO: 5.27 10*6/MM3 (ref 3.77–5.28)
RSV RNA NPH QL NAA+NON-PROBE: NOT DETECTED
SARS-COV-2 RNA RESP QL NAA+PROBE: NOT DETECTED
SODIUM SERPL-SCNC: 135 MMOL/L (ref 136–145)
TROPONIN T SERPL HS-MCNC: 10 NG/L
WBC NRBC COR # BLD AUTO: 13.81 10*3/MM3 (ref 3.4–10.8)
WHOLE BLOOD HOLD COAG: NORMAL
WHOLE BLOOD HOLD SPECIMEN: NORMAL

## 2024-07-13 PROCEDURE — 25010000002 METHYLPREDNISOLONE PER 125 MG: Performed by: EMERGENCY MEDICINE

## 2024-07-13 PROCEDURE — 83880 ASSAY OF NATRIURETIC PEPTIDE: CPT | Performed by: EMERGENCY MEDICINE

## 2024-07-13 PROCEDURE — 87637 SARSCOV2&INF A&B&RSV AMP PRB: CPT

## 2024-07-13 PROCEDURE — 93005 ELECTROCARDIOGRAM TRACING: CPT

## 2024-07-13 PROCEDURE — 25010000002 LORAZEPAM PER 2 MG: Performed by: EMERGENCY MEDICINE

## 2024-07-13 PROCEDURE — 96374 THER/PROPH/DIAG INJ IV PUSH: CPT

## 2024-07-13 PROCEDURE — 93005 ELECTROCARDIOGRAM TRACING: CPT | Performed by: EMERGENCY MEDICINE

## 2024-07-13 PROCEDURE — 71260 CT THORAX DX C+: CPT

## 2024-07-13 PROCEDURE — 84484 ASSAY OF TROPONIN QUANT: CPT | Performed by: EMERGENCY MEDICINE

## 2024-07-13 PROCEDURE — 94799 UNLISTED PULMONARY SVC/PX: CPT

## 2024-07-13 PROCEDURE — 85025 COMPLETE CBC W/AUTO DIFF WBC: CPT | Performed by: EMERGENCY MEDICINE

## 2024-07-13 PROCEDURE — 99285 EMERGENCY DEPT VISIT HI MDM: CPT

## 2024-07-13 PROCEDURE — 25510000001 IOPAMIDOL PER 1 ML: Performed by: EMERGENCY MEDICINE

## 2024-07-13 PROCEDURE — 96375 TX/PRO/DX INJ NEW DRUG ADDON: CPT

## 2024-07-13 PROCEDURE — 71045 X-RAY EXAM CHEST 1 VIEW: CPT

## 2024-07-13 PROCEDURE — 94640 AIRWAY INHALATION TREATMENT: CPT

## 2024-07-13 PROCEDURE — 80053 COMPREHEN METABOLIC PANEL: CPT | Performed by: EMERGENCY MEDICINE

## 2024-07-13 RX ORDER — METHYLPREDNISOLONE 4 MG/1
TABLET ORAL
Qty: 21 TABLET | Refills: 0 | Status: SHIPPED | OUTPATIENT
Start: 2024-07-13 | End: 2024-07-18

## 2024-07-13 RX ORDER — SODIUM CHLORIDE 0.9 % (FLUSH) 0.9 %
10 SYRINGE (ML) INJECTION AS NEEDED
Status: DISCONTINUED | OUTPATIENT
Start: 2024-07-13 | End: 2024-07-14 | Stop reason: HOSPADM

## 2024-07-13 RX ORDER — LORAZEPAM 2 MG/ML
1 INJECTION INTRAMUSCULAR ONCE
Status: COMPLETED | OUTPATIENT
Start: 2024-07-13 | End: 2024-07-13

## 2024-07-13 RX ORDER — IPRATROPIUM BROMIDE AND ALBUTEROL SULFATE 2.5; .5 MG/3ML; MG/3ML
3 SOLUTION RESPIRATORY (INHALATION) ONCE
Status: COMPLETED | OUTPATIENT
Start: 2024-07-13 | End: 2024-07-13

## 2024-07-13 RX ORDER — ALBUTEROL SULFATE 0.63 MG/3ML
1 SOLUTION RESPIRATORY (INHALATION) EVERY 6 HOURS PRN
Qty: 30 EACH | Refills: 0 | Status: SHIPPED | OUTPATIENT
Start: 2024-07-13

## 2024-07-13 RX ADMIN — WATER 125 MG: 1 INJECTION INTRAMUSCULAR; INTRAVENOUS; SUBCUTANEOUS at 23:40

## 2024-07-13 RX ADMIN — IOPAMIDOL 100 ML: 755 INJECTION, SOLUTION INTRAVENOUS at 22:05

## 2024-07-13 RX ADMIN — LORAZEPAM 1 MG: 2 INJECTION INTRAMUSCULAR; INTRAVENOUS at 23:40

## 2024-07-13 RX ADMIN — IPRATROPIUM BROMIDE AND ALBUTEROL SULFATE 3 ML: .5; 3 SOLUTION RESPIRATORY (INHALATION) at 22:59

## 2024-07-13 NOTE — ED PROVIDER NOTES
"Time: 7:48 PM EDT  Date of encounter:  7/13/2024  Independent Historian/Clinical History and Information was obtained by:   Patient    History is limited by: N/A    Chief Complaint   Patient presents with    Shortness of Breath         History of Present Illness:  Patient is a 54 y.o. year old female who presents to the emergency department for evaluation of shortness of breath for the past couple weeks but worsened today.  Patient states that her dry cough for the past 2 weeks.  Patient states she is currently on Eliquis due to bilateral PEs that she was diagnosed with in 2020.  She was told to continue Eliquis due to not finding out where she developed the PEs from.  Patient states she used her albuterol inhaler 3 times prior to arrival.  States that it makes her shaky.  Patient states she is lightheaded and dizzy upon standing and has palpitations that started today.  She denies history of A-fib.  Denies fevers recent travel, exposure to illness or recent surgery.  Denies chest pain asthma or COPD.. Denies history afibb.       fPatient Care Team  Primary Care Provider: Guy Beatty MD    Past Medical History:     No Known Allergies  Past Medical History:   Diagnosis Date    Acute deep vein thrombosis (DVT) of right popliteal vein     Anemia     Anxiety     Arthritis     Bilateral pulmonary embolism     Hypertension     Legally blind     Neuropathy     some pains at night in the feet    Obesity     Pleuritic chest pain     Retinopathy     no retinopathy but does have \"blood behind the eyes\"; going for injections and laser treatment; she states her vision has improved some and she will be undergoing laser treatment    Thyroid disease     removed due to thyroid nodules     Type 2 diabetes mellitus      Past Surgical History:   Procedure Laterality Date    CHOLECYSTECTOMY      EYE SURGERY Left 09/28/2022    HYSTERECTOMY      PARTIAL    THYROID SURGERY      US GUIDED FINE NEEDLE ASPIRATION  03/02/2020     Family " History   Problem Relation Age of Onset    Diabetes type II Mother     Stroke Father     Diabetes type II Other         grandparents       Home Medications:  Prior to Admission medications    Medication Sig Start Date End Date Taking? Authorizing Provider   albuterol sulfate  (90 Base) MCG/ACT inhaler Inhale 2 puffs Every 4 (Four) Hours As Needed for Wheezing or Shortness of Air. 6/9/24   Fatmata Sam APRN   amLODIPine-benazepril (LOTREL) 10-40 MG per capsule TAKE 1 CAPSULE BY MOUTH DAILY 6/7/24 6/7/25  Guy Beatty MD   atorvastatin (LIPITOR) 10 MG tablet Take 1 tablet by mouth Daily. 12/15/23   Guy Beatty MD   Blood Glucose Monitoring Suppl kit Monitor kit - instructed on use 7/3/24   Guy Beatty MD   chlorthalidone (HYGROTON) 25 MG tablet TAKE 1 TABLET BY MOUTH EVERY DAY 9/21/23   Guy Beatty MD   DULoxetine (CYMBALTA) 60 MG capsule Take 1 capsule by mouth Daily. 6/7/24   Guy Beatty MD   Eliquis 5 MG tablet tablet TAKE 1 TABLET BY MOUTH TWICE DAILY 7/8/24   Guy Beatty MD   gabapentin (NEURONTIN) 300 MG capsule TAKE 1 CAPSULE BY MOUTH THREE TIMES DAILY 1/3/24   Guy Beatty MD   glucose blood test strip Use to check blood sugar 4 times per day 7/3/24   Guy Beatty MD   hydrOXYzine (ATARAX) 25 MG tablet TAKE 1 TABLET BY MOUTH THREE TIMES DAILY AS NEEDED FOR ANXIETY 5/16/24   Guy Beatty MD   Insulin Aspart FlexPen 100 UNIT/ML solution pen-injector INJECT 5 UNITS UNDER THE SKIN THREE TIMES DAILY PRIOR TO MEALS, WITH ADDITIONAL SLIDING SCALE AS DIRECTED; MAX UNITS PER DAY IS 60 12/6/23   Guy Beatty MD   Insulin Glargine (LANTUS SOLOSTAR) 100 UNIT/ML injection pen Inject 34 Units under the skin into the appropriate area as directed Daily. 7/3/24   Guy Beatty MD   Lancets (onetouch ultrasoft) lancets Use as directed to check blood sugar 4 times per day 7/3/24   Guy Beatty MD   levothyroxine (SYNTHROID, LEVOTHROID) 200 MCG  tablet TAKE 1 TABLET BY MOUTH EVERY DAY 5/20/24   Guy Beatty MD   liothyronine (CYTOMEL) 5 MCG tablet Take 1 tablet by mouth 2 (Two) Times a Day. 12/18/23   Guy Beatty MD   methylPREDNISolone (MEDROL) 4 MG dose pack Take as directed on package instructions. 6/9/24   Fatmata Sam APRN   pantoprazole (PROTONIX) 40 MG EC tablet TAKE 1 TABLET BY MOUTH EVERY DAY 9/21/23   Guy Beatty MD   promethazine-dextromethorphan (PROMETHAZINE-DM) 6.25-15 MG/5ML syrup Take 5 mL by mouth 4 (Four) Times a Day As Needed for Cough. 6/9/24   Fatmata Sam APRN   rOPINIRole (REQUIP) 0.25 MG tablet Take 1 tablet by mouth Every Night. 11/13/23   Guy Beatty MD   Semaglutide, 1 MG/DOSE, (Ozempic, 1 MG/DOSE,) 4 MG/3ML solution pen-injector INJECT 1MG SUBCUTANEOUS ONCE WEEKLY FOR 9-12 2/21/24   Guy Beatty MD   sertraline (Zoloft) 25 MG tablet Take 3 tablets by mouth Daily. Take 75 mg by mouth daily (one 25 mg and one 50 mg). 12/15/23   Guy Beatty MD   sucralfate (Carafate) 1 g tablet Take 1 tablet by mouth 4 (Four) Times a Day. 4/11/24   Guy Beatty MD        Social History:   Social History     Tobacco Use    Smoking status: Never     Passive exposure: Never    Smokeless tobacco: Never   Vaping Use    Vaping status: Never Used   Substance Use Topics    Alcohol use: Not Currently     Comment: OCCASIONAL/SOCIAL    Drug use: Never         Review of Systems:  Review of Systems   Constitutional: Negative.  Negative for chills and fever.   HENT: Negative.     Eyes: Negative.    Respiratory:  Positive for cough and shortness of breath.    Cardiovascular:  Positive for palpitations. Negative for chest pain.   Gastrointestinal: Negative.    Endocrine: Negative.    Genitourinary: Negative.    Musculoskeletal: Negative.    Skin: Negative.    Allergic/Immunologic: Negative.    Neurological:  Positive for dizziness and light-headedness.   Hematological: Negative.    Psychiatric/Behavioral:  "Negative.          Physical Exam:  /76 (BP Location: Right arm, Patient Position: Sitting)   Pulse 101   Temp 98.1 °F (36.7 °C) (Oral)   Resp 18   Ht 162.6 cm (64\")   Wt 103 kg (226 lb 10.1 oz)   SpO2 95%   BMI 38.90 kg/m²         Physical Exam  Vitals and nursing note reviewed.   Constitutional:       Appearance: Normal appearance.   HENT:      Head: Normocephalic and atraumatic.      Nose: Nose normal.      Mouth/Throat:      Mouth: Mucous membranes are moist.   Eyes:      Extraocular Movements: Extraocular movements intact.      Conjunctiva/sclera: Conjunctivae normal.      Pupils: Pupils are equal, round, and reactive to light.   Cardiovascular:      Rate and Rhythm: Regular rhythm. Tachycardia present.      Heart sounds: Normal heart sounds.   Pulmonary:      Effort: Pulmonary effort is normal. No respiratory distress.      Breath sounds: Normal breath sounds. No stridor. No wheezing, rhonchi or rales.   Musculoskeletal:         General: Normal range of motion.      Cervical back: Normal range of motion and neck supple.   Skin:     General: Skin is warm and dry.   Neurological:      General: No focal deficit present.      Mental Status: She is alert and oriented to person, place, and time.   Psychiatric:         Mood and Affect: Mood normal.         Behavior: Behavior normal.                Procedures:  Procedures      Medical Decision Making:      Comorbidities that affect care:    Anemia, bilateral PEs, anxiety, Diabetes, Hypertension, Obesity    External Notes reviewed:    Previous Clinic Note: Urgent care visit 6/9/2024      The following orders were placed and all results were independently analyzed by me:  Orders Placed This Encounter   Procedures    Home Nebulizer    COVID-19, FLU A/B, RSV PCR 1 HR TAT - Swab, Nasopharynx    XR Chest 1 View    CT Chest With Contrast Diagnostic    Simpson Draw    Comprehensive Metabolic Panel    BNP    Single High Sensitivity Troponin T    CBC Auto " Differential    Ambulatory Referral to Pulmonology    NPO Diet NPO Type: Strict NPO    Undress & Gown    Continuous Pulse Oximetry    Vital Signs    Oxygen Therapy- Nasal Cannula; Titrate 1-6 LPM Per SpO2; 90 - 95%    ECG 12 Lead ED Triage Standing Order; SOA    Insert Peripheral IV    CBC & Differential    Green Top (Gel)    Lavender Top    Gold Top - SST    Light Blue Top       Medications Given in the Emergency Department:  Medications   sodium chloride 0.9 % flush 10 mL (has no administration in time range)   methylPREDNISolone sodium succinate (SOLU-Medrol) 125 mg in sterile water (preservative free) 2 mL (has no administration in time range)   LORazepam (ATIVAN) injection 1 mg (has no administration in time range)   iopamidol (ISOVUE-370) 76 % injection 100 mL (100 mL Intravenous Given 7/13/24 2205)   ipratropium-albuterol (DUO-NEB) nebulizer solution 3 mL (3 mL Nebulization Given 7/13/24 2259)        ED Course:    The patient was initially evaluated in the triage area where orders were placed. The patient was later dispositioned by Megan Cloud PA-C.      The patient was advised to stay for completion of workup which includes but is not limited to communication of labs and radiological results, reassessment and plan. The patient was advised that leaving prior to disposition by a provider could result in critical findings that are not communicated to the patient.     ED Course as of 07/13/24 2317   Sat Jul 13, 2024   2312 Patient states she does feel little bit better but still having some mild chest tightness. [AJ]      ED Course User Index  [AJ] Megan Cloud PA-C       Labs:    Lab Results (last 24 hours)       Procedure Component Value Units Date/Time    CBC & Differential [[id number]]  (Abnormal) Collected: 07/13/24 2019    Specimen: Blood Updated: 07/13/24 2029    Narrative:      The following orders were created for panel order CBC & Differential.  Procedure                                Abnormality         Status                     ---------                               -----------         ------                     CBC Auto Differential[[id number]]        Abnormal            Final result                 Please view results for these tests on the individual orders.    Comprehensive Metabolic Panel [[id number]]  (Abnormal) Collected: 07/13/24 2019    Specimen: Blood Updated: 07/13/24 2057     Glucose 303 mg/dL      BUN 21 mg/dL      Creatinine 1.35 mg/dL      Sodium 135 mmol/L      Potassium 3.6 mmol/L      Chloride 94 mmol/L      CO2 27.9 mmol/L      Calcium 9.6 mg/dL      Total Protein 7.5 g/dL      Albumin 4.3 g/dL      ALT (SGPT) 21 U/L      AST (SGOT) 20 U/L      Alkaline Phosphatase 122 U/L      Total Bilirubin 0.7 mg/dL      Globulin 3.2 gm/dL      A/G Ratio 1.3 g/dL      BUN/Creatinine Ratio 15.6     Anion Gap 13.1 mmol/L      eGFR 46.5 mL/min/1.73     Narrative:      GFR Normal >60  Chronic Kidney Disease <60  Kidney Failure <15      BNP [[id number]]  (Normal) Collected: 07/13/24 2019    Specimen: Blood Updated: 07/13/24 2053     proBNP 130.6 pg/mL     Narrative:      This assay is used as an aid in the diagnosis of individuals suspected of having heart failure. It can be used as an aid in the diagnosis of acute decompensated heart failure (ADHF) in patients presenting with signs and symptoms of ADHF to the emergency department (ED). In addition, NT-proBNP of <300 pg/mL indicates ADHF is not likely.    Age Range Result Interpretation  NT-proBNP Concentration (pg/mL:      <50             Positive            >450                   Gray                 300-450                    Negative             <300    50-75           Positive            >900                  Gray                300-900                  Negative            <300      >75             Positive            >1800                  Gray                300-1800                  Negative            <300    Single High Sensitivity  Troponin T [[id number]]  (Normal) Collected: 07/13/24 2019    Specimen: Blood Updated: 07/13/24 2057     HS Troponin T 10 ng/L     Narrative:      High Sensitive Troponin T Reference Range:  <14.0 ng/L- Negative Female for AMI  <22.0 ng/L- Negative Male for AMI  >=14 - Abnormal Female indicating possible myocardial injury.  >=22 - Abnormal Male indicating possible myocardial injury.   Clinicians would have to utilize clinical acumen, EKG, Troponin, and serial changes to determine if it is an Acute Myocardial Infarction or myocardial injury due to an underlying chronic condition.         CBC Auto Differential [[id number]]  (Abnormal) Collected: 07/13/24 2019    Specimen: Blood Updated: 07/13/24 2029     WBC 13.81 10*3/mm3      RBC 5.27 10*6/mm3      Hemoglobin 15.8 g/dL      Hematocrit 44.7 %      MCV 84.8 fL      MCH 30.0 pg      MCHC 35.3 g/dL      RDW 13.9 %      RDW-SD 42.9 fl      MPV 9.3 fL      Platelets 322 10*3/mm3      Neutrophil % 77.1 %      Lymphocyte % 17.5 %      Monocyte % 4.1 %      Eosinophil % 0.2 %      Basophil % 0.7 %      Immature Grans % 0.4 %      Neutrophils, Absolute 10.64 10*3/mm3      Lymphocytes, Absolute 2.42 10*3/mm3      Monocytes, Absolute 0.57 10*3/mm3      Eosinophils, Absolute 0.03 10*3/mm3      Basophils, Absolute 0.09 10*3/mm3      Immature Grans, Absolute 0.06 10*3/mm3      nRBC 0.0 /100 WBC     COVID-19, FLU A/B, RSV PCR 1 HR TAT - Swab, Nasopharynx [[id number]]  (Normal) Collected: 07/13/24 2158    Specimen: Swab from Nasopharynx Updated: 07/13/24 2248     COVID19 Not Detected     Influenza A PCR Not Detected     Influenza B PCR Not Detected     RSV, PCR Not Detected    Narrative:      Fact sheet for providers: https://www.fda.gov/media/761350/download    Fact sheet for patients: https://www.fda.gov/media/706369/download    Test performed by PCR.             Imaging:    CT Chest With Contrast Diagnostic    Result Date: 7/13/2024  CT CHEST W CONTRAST DIAGNOSTIC-  Date of exam:  7/13/2024, 9:50 P.M.  Indications: SOA/SOB/shortness of air/shortness of breath.  Comparisons: 7/13/2024; 11/16/2019.  TECHNIQUE: Axial CT images were obtained of the chest after the uneventful intravenous administration of 85 mL of Isovue 370 nonionic contrast agent.  Reconstructed 2D coronal and sagittal images were also obtained. Automated exposure control and iterative construction methods were used. No 3D reformations are provided for review.  FINDINGS: No pulmonary embolism. No acute infiltrate. No cardiac enlargement. No thoracic aortic aneurysm or dissection. There is motion artifact on the exam. No pleural or pericardial effusion. The central tracheobronchial tree is well aerated without filling defect. No suspicious pulmonary nodules are seen. There is pulmonary hypoinflation. The patient has undergone total thyroidectomy. There is diffuse hepatic steatosis with hepatomegaly. The patient has undergone cholecystectomy, as well. No acute findings are seen in the partially imaged upper abdomen. No acute fracture. No aggressive osseous lesion.      No pulmonary embolism. No acute infiltrate.    Please note that portions of this note were completed with a voice recognition program.       Electronically Signed By-Chris Siddiqi MD On:7/13/2024 10:10 PM      XR Chest 1 View    Result Date: 7/13/2024  XR CHEST 1 VW-  Date of Exam: 7/13/2024 7:35 PM  Indication: SOA/SOB/shortness of air/shortness of breath.  Comparison: 6/9/2024.  FINDINGS: A single AP (or PA) upright portable chest radiograph was performed. No cardiac enlargement is seen. No acute infiltrate is appreciated. No pleural effusion or pneumothorax is identified. No significant interval change is seen since the prior study (or studies).      No acute infiltrate is appreciated.    Please note that portions of this note were completed with a voice recognition program.   Electronically Signed By-Chris Siddiqi MD On:7/13/2024 8:27 PM         Differential  Diagnosis and Discussion:      Dyspnea: Differential diagnosis includes but is not limited to metabolic acidosis, neurological disorders, psychogenic, asthma, pneumothorax, upper airway obstruction, COPD, pneumonia, noncardiogenic pulmonary edema, interstitial lung disease, anemia, congestive heart failure, and pulmonary embolism    All labs were reviewed and interpreted by me.  All X-rays impressions were independently interpreted by me.  EKG was interpreted by me.  EKG was interpreted by supervising attending.  CT scan radiology impression was interpreted by me.    MDM     Amount and/or Complexity of Data Reviewed  Clinical lab tests: reviewed  Tests in the radiology section of CPT®: reviewed  Tests in the medicine section of CPT®: reviewed  Decide to obtain previous medical records or to obtain history from someone other than the patient: yes                 Patient Care Considerations:    SEPSIS was considered but is NOT present in the emergency department as SIRS criteria is not present. ANTIBIOTICS: I considered prescribing antibiotics as an outpatient however no bacterial focus of infection was found.      Consultants/Shared Management Plan:    SHARED VISIT: I have discussed the case with my supervising physician, Dr. Elliott who stateshe has looked at patients EKG and lab work. She is not in afibb. The substantive portion of the medical decision was made by the attesting physician who made or approve the management plan and will take responsibility for the patient.  Clinical findings were discussed and ultimate disposition was made in consult with supervising physician.    Social Determinants of Health:    Patient is independent, reliable, and has access to care.       Disposition and Care Coordination:    Discharged: The patient is suitable and stable for discharge with no need for consideration of admission.    I have explained the patient´s condition, diagnoses and treatment plan based on the information  available to me at this time. I have answered questions and addressed any concerns. The patient has a good  understanding of the patient´s diagnosis, condition, and treatment plan as can be expected at this point. The vital signs have been stable. The patient´s condition is stable and appropriate for discharge from the emergency department.      The patient will pursue further outpatient evaluation with the primary care physician or other designated or consulting physician as outlined in the discharge instructions. They are agreeable to this plan of care and follow-up instructions have been explained in detail. The patient has received these instructions in written format and has expressed an understanding of the discharge instructions. The patient is aware that any significant change in condition or worsening of symptoms should prompt an immediate return to this or the closest emergency department or call to 911.  I have explained discharge medications and the need for follow up with the patient/caretakers. This was also printed in the discharge instructions. Patient was discharged with the following medications and follow up:      Medication List        Changed      * albuterol sulfate  (90 Base) MCG/ACT inhaler  Commonly known as: PROVENTIL HFA;VENTOLIN HFA;PROAIR HFA  Inhale 2 puffs Every 4 (Four) Hours As Needed for Wheezing or Shortness of Air.  What changed: Another medication with the same name was added. Make sure you understand how and when to take each.     * albuterol 0.63 MG/3ML nebulizer solution  Commonly known as: ACCUNEB  Take 3 mL by nebulization Every 6 (Six) Hours As Needed for Wheezing.  What changed: You were already taking a medication with the same name, and this prescription was added. Make sure you understand how and when to take each.     * methylPREDNISolone 4 MG dose pack  Commonly known as: MEDROL  Take as directed on package instructions.  What changed: Another medication with  the same name was added. Make sure you understand how and when to take each.     * methylPREDNISolone 4 MG dose pack  Commonly known as: MEDROL  Take 6 tablets by mouth Daily for 1 day, THEN 5 tablets Daily for 1 day, THEN 4 tablets Daily for 1 day, THEN 3 tablets Daily for 1 day, THEN 2 tablets Daily for 1 day, THEN 1 tablet Daily for 1 day. Take as directed on package instructions.  Start taking on: July 13, 2024  What changed: You were already taking a medication with the same name, and this prescription was added. Make sure you understand how and when to take each.           * This list has 4 medication(s) that are the same as other medications prescribed for you. Read the directions carefully, and ask your doctor or other care provider to review them with you.                   Where to Get Your Medications        These medications were sent to [id number] - [place] - [address] AT [facility] - [phone] [facility] [phone]   [address] [place] [address]      Phone: [phone]   albuterol 0.63 MG/3ML nebulizer solution  methylPREDNISolone 4 MG dose pack      Guy Beatty MD  [address]  [address]  [facility] [id number] [phone]             Final diagnoses:   Shortness of breath        ED Disposition       ED Disposition   Discharge    Condition   Stable    Comment   --               This medical record created using voice recognition software.             Megan Cloud PA-C  07/13/24 2459

## 2024-07-14 LAB
QT INTERVAL: 382 MS
QTC INTERVAL: 509 MS

## 2024-07-14 NOTE — DISCHARGE INSTRUCTIONS
Your workup was negative for congestive heart failure, pneumonia, PE, COVID and flu    I have sent a steroid pack along with a home nebulizer machine and solution.  Please use every 4-6 hours as needed for shortness of breath    Please follow-up with your PCP and I have put in referral for pulmonology as well

## 2024-07-15 RX ORDER — IPRATROPIUM BROMIDE AND ALBUTEROL SULFATE 2.5; .5 MG/3ML; MG/3ML
3 SOLUTION RESPIRATORY (INHALATION) EVERY 4 HOURS PRN
Qty: 360 ML | Refills: 0 | Status: SHIPPED | OUTPATIENT
Start: 2024-07-15 | End: 2024-07-15

## 2024-07-16 ENCOUNTER — HOSPITAL ENCOUNTER (OUTPATIENT)
Dept: CT IMAGING | Facility: HOSPITAL | Age: 54
Discharge: HOME | End: 2024-07-16
Admitting: NURSE PRACTITIONER
Payer: COMMERCIAL

## 2024-07-16 DIAGNOSIS — R10.13 EPIGASTRIC PAIN: ICD-10-CM

## 2024-07-16 PROCEDURE — 74176 CT ABD & PELVIS W/O CONTRAST: CPT

## 2024-07-18 ENCOUNTER — TELEPHONE (OUTPATIENT)
Dept: INTERNAL MEDICINE | Facility: CLINIC | Age: 54
End: 2024-07-18
Payer: COMMERCIAL

## 2024-07-18 ENCOUNTER — TELEPHONE (OUTPATIENT)
Dept: INTERNAL MEDICINE | Facility: CLINIC | Age: 54
End: 2024-07-18

## 2024-07-18 NOTE — TELEPHONE ENCOUNTER
Caller: Carmelina Saldana    Relationship: Self    Best call back number: [phone]     What test was performed: CT ABDOMEN    When was the test performed:7/16/24    [facility]

## 2024-07-22 ENCOUNTER — TELEPHONE (OUTPATIENT)
Dept: INTERNAL MEDICINE | Facility: CLINIC | Age: 54
End: 2024-07-22

## 2024-07-22 NOTE — TELEPHONE ENCOUNTER
Caller: Carmelina Saldana    Relationship to patient: Self    Best call back number: [phone]    PATIENT STATED [facility] HAS NOT RECEIVED MEDICAL CLEARANCE FOR SURGERY. PATIENT WOULD LIKE A CALL BACK ON STATUS FOR MEDICAL CLEARANCE.

## 2024-07-30 NOTE — PROGRESS NOTES
Chief Complaint  Med Refill, Shortness of Breath (Patient states that she has been having SOB, has been going on for about a month. ), Chest Pain (Patient is not having active chest pain, but states that it comes and goes ), Palpitations, Abdominal Pain (Patient states she had a CT done a couple weeks ago and has not gotten her results yet ), Shaking (Patient states that she has been shaking lately, has been going on for about a month ), and Medication Problem (Patient has to fly on a plane soon, wants to see about getting something to take for her anxiety for the plane ride )    Subjective          Carmelina Saldana presents to [facility]  History of Present Illness    Here with sister.  Primary historian is Carmelina Amesley, with supplementary history from her sister as noted.    Seen 7/13/24 at ER for dyspnea.  Had CT showing no evidence of PE.  EKG notable for 3:1 AV block and LAFB.  Given steroids, which helped while she was taking them.    She does report episodic chest tightness with palpitations and dyspnea for the past month.  In terms of her chest tightness, it occurs in the center of her chest.  It is provoked with walking, and resting relieves it.  It has been present for about a month.  No syncopal episodes, though she does report experiencing palpitations, shortness of air, and presyncope.    Has bilateral UE tremor that interferes with activities such as drinking from a cup.  Per her sister, her sister as well as their mother have a history of essential tremor (althouhg Carmelina Saldana's is more severe).  Miss Saldana reports she only urinates 1-2 times a day . She reports diplopia/being cross-eyed at times.  She reports issues of balance that at times results in falls.  She does deny syncopal episodes, though she does endorse presyncopal episodes.    Of note, she reports she is going on a flight this Sunday, and wants to know if it is safe for her to  fly.  She is requesting medicine for anxiety to take on her flight.    Current Outpatient Medications   Medication Instructions    albuterol (ACCUNEB) 0.63 mg, Nebulization, Every 6 Hours PRN    albuterol sulfate  (90 Base) MCG/ACT inhaler 2 puffs, Inhalation, Every 4 Hours PRN    amLODIPine-benazepril (LOTREL) 10-40 MG per capsule 1 capsule, Oral, Daily    atorvastatin (LIPITOR) 10 mg, Oral, Daily    Blood Glucose Monitoring Suppl kit Monitor kit - instructed on use    busPIRone (BUSPAR) 5 mg, Oral, 3 Times Daily    chlorthalidone (HYGROTON) 25 MG tablet TAKE 1 TABLET BY MOUTH EVERY DAY    DULoxetine (CYMBALTA) 60 mg, Oral, Daily    Eliquis 5 mg, Oral, 2 Times Daily    gabapentin (NEURONTIN) 300 MG capsule TAKE 1 CAPSULE BY MOUTH THREE TIMES DAILY    glucose blood test strip Use to check blood sugar 4 times per day    hydrOXYzine (ATARAX) 25 mg, Oral, 3 Times Daily PRN, for anxiety    Insulin Aspart FlexPen 100 UNIT/ML solution pen-injector INJECT 5 UNITS UNDER THE SKIN THREE TIMES DAILY PRIOR TO MEALS, WITH ADDITIONAL SLIDING SCALE AS DIRECTED; MAX UNITS PER DAY IS 60    Insulin Glargine (LANTUS SOLOSTAR) 34 Units, Subcutaneous, Daily    Lancets (onetouch ultrasoft) lancets Use as directed to check blood sugar 4 times per day    levothyroxine (SYNTHROID, LEVOTHROID) 200 mcg, Oral, Daily    liothyronine (CYTOMEL) 5 mcg, Oral, 2 Times Daily    methylPREDNISolone (MEDROL) 4 MG dose pack Take as directed on package instructions.    pantoprazole (PROTONIX) 40 MG EC tablet TAKE 1 TABLET BY MOUTH EVERY DAY    promethazine-dextromethorphan (PROMETHAZINE-DM) 6.25-15 MG/5ML syrup 5 mL, Oral, 4 Times Daily PRN    rOPINIRole (REQUIP) 0.25 mg, Oral, Nightly    Semaglutide, 1 MG/DOSE, (Ozempic, 1 MG/DOSE,) 4 MG/3ML solution pen-injector INJECT 1MG SUBCUTANEOUS ONCE WEEKLY FOR 9-12    sertraline (ZOLOFT) 75 mg, Oral, Daily, Take 75 mg by mouth daily (one 25 mg and one 50 mg).    sucralfate (CARAFATE) 1 g, Oral, 4 Times  "Daily       The following portions of the patient's history were reviewed and updated as appropriate: allergies, current medications, past family history, past medical history, past social history, past surgical history, and problem list.    Objective   Vital Signs:   /83 (BP Location: Right arm, Patient Position: Sitting, Cuff Size: Large Adult)   Pulse 102   Temp 96.8 °F (36 °C) (Temporal)   Ht 162.6 cm (64\")   Wt 103 kg (227 lb)   SpO2 93%   BMI 38.96 kg/m²     BP Readings from Last 3 Encounters:   07/31/24 114/83   07/13/24 151/84   06/09/24 149/89     Wt Readings from Last 3 Encounters:   07/31/24 103 kg (227 lb)   07/13/24 103 kg (226 lb 10.1 oz)   06/09/24 119 kg (263 lb)           Physical Exam  Vitals reviewed.   Constitutional:       General: She is not in acute distress.     Appearance: Normal appearance. She is not ill-appearing, toxic-appearing or diaphoretic.   HENT:      Head: Normocephalic and atraumatic.      Right Ear: External ear normal.      Left Ear: External ear normal.      Mouth/Throat:      Comments: Symmetric smile  Eyes:      Extraocular Movements: Extraocular movements intact.      Conjunctiva/sclera: Conjunctivae normal.      Pupils: Pupils are equal, round, and reactive to light.   Cardiovascular:      Rate and Rhythm: Normal rate and regular rhythm.      Pulses: Normal pulses.      Heart sounds: Normal heart sounds. No murmur heard.     No friction rub. No gallop.   Pulmonary:      Effort: Pulmonary effort is normal. No respiratory distress.      Breath sounds: Normal breath sounds. No stridor. No wheezing, rhonchi or rales.   Chest:      Chest wall: No tenderness.   Abdominal:      General: Abdomen is flat.      Palpations: Abdomen is soft. There is no mass.      Tenderness: There is no abdominal tenderness.   Musculoskeletal:      Right lower leg: No edema.      Left lower leg: No edema.   Skin:     General: Skin is warm and dry.   Neurological:      Mental Status: She " is alert. Mental status is at baseline.      Comments: No resting tremor noted   Psychiatric:         Behavior: Behavior normal.         Thought Content: Thought content normal.         Judgment: Judgment normal.          Result Review :   The following data was reviewed by: Guy Beatty MD on 07/31/2024:  Common labs          12/15/2023    12:18 3/14/2024    16:00 7/13/2024    20:19   Common Labs   Glucose 213  186  303    BUN 25  24  21    Creatinine 1.26  1.40  1.35    Sodium 137  136  135    Potassium 4.5  4.6  3.6    Chloride 99  97  94    Calcium 9.9  9.3  9.6    Albumin 4.4  4.1  4.3    Total Bilirubin 0.4  0.5  0.7    Alkaline Phosphatase 116  123  122    AST (SGOT) 23  30  20    ALT (SGPT) 17  34  21    WBC  12.17  13.81    Hemoglobin  15.0  15.8    Hematocrit  45.3  44.7    Platelets  293  322    Hemoglobin A1C 8.40               Lab Results   Component Value Date    SARSANTIGEN Not Detected 06/09/2024    COVID19 Not Detected 07/13/2024    FLUAAG Not Detected 06/09/2024    FLUBAG Not Detected 06/09/2024    RAPSCRN Negative 06/09/2024    RSV Not Detected 07/13/2024    INR 0.92 (L) 11/23/2020            Assessment and Plan    Diagnoses and all orders for this visit:    1. Type 2 diabetes mellitus with diabetic neuropathy, with long-term current use of insulin (Primary)  -     CBC & Differential  -     Comprehensive Metabolic Panel  -     Hemoglobin A1c  -     Microalbumin / Creatinine Urine Ratio - Urine, Clean Catch    2. Essential hypertension  -     Comprehensive Metabolic Panel    3. Other specified hypothyroidism  -     TSH    4. Anxiety and depression  -     busPIRone (BUSPAR) 5 MG tablet; Take 1 tablet by mouth 3 (Three) Times a Day.  Dispense: 90 tablet; Refill: 1    5. Other hyperlipidemia  -     Lipid Panel    6. Medication management  -     POC Medline 12 Panel Urine Drug Screen    7. Screening mammogram for breast cancer  -     Mammo Screening Digital Tomosynthesis Bilateral With CAD;  Future    8. Chest tightness  -     Stress test with myocardial perfusion; Future  -     Adult Transthoracic Echo Complete W/ Cont if Necessary Per Protocol; Future  -     Ambulatory Referral to Cardiology    9. AV block, 2nd degree  -     Stress test with myocardial perfusion; Future  -     Adult Transthoracic Echo Complete W/ Cont if Necessary Per Protocol; Future  -     Ambulatory Referral to Cardiology    10. Palpitation  -     Stress test with myocardial perfusion; Future  -     Adult Transthoracic Echo Complete W/ Cont if Necessary Per Protocol; Future  -     Ambulatory Referral to Cardiology    11. Diplopia  -     MRI Brain With & Without Contrast; Future    12. Balance problem  -     MRI Brain With & Without Contrast; Future    13. Tremor  -     MRI Brain With & Without Contrast; Future      Chest tightness:  -CT chest with no evidence of PE  -EKG with 3:1 AV block and LAFB  -history concerning for angina (chest tightness provoked by activity, and relieved by rest)  -in terms of risk factors, her age as well as history of poorly controlled diabetes  -I did  her that in the event of sustained chest tightness or chest pain she should contact 911 and be transported to ER via ambulance  -she asked if she was safe for her upcoming airplane trip, and I did tell her that given the current concern for cardiac etiology of her chest pain I could not vouch for her safety  -reviewed CT results with patient at clinic    HTN:  -stable  -will continue chlorthalidone, lotrel    T2DM/neuropathy:  -will continue gabapentin at current dose  -labs today    Tremor, Diplopia, Balance problem:  -family history of essential tremor noted.  -will obtain MRI with and without    There are no discontinued medications.       Follow Up   Return in about 6 weeks (around 9/11/2024) for Annual physical.  Patient was given instructions and counseling regarding her condition or for health maintenance advice. Please see specific  information pulled into the AVS if appropriate.       Guy Beatty MD  07/31/24  14:01 EDT

## 2024-07-31 ENCOUNTER — OFFICE VISIT (OUTPATIENT)
Dept: INTERNAL MEDICINE | Facility: CLINIC | Age: 54
End: 2024-07-31
Payer: COMMERCIAL

## 2024-07-31 VITALS
BODY MASS INDEX: 38.76 KG/M2 | OXYGEN SATURATION: 93 % | DIASTOLIC BLOOD PRESSURE: 83 MMHG | SYSTOLIC BLOOD PRESSURE: 114 MMHG | TEMPERATURE: 96.8 F | HEART RATE: 102 BPM | WEIGHT: 227 LBS | HEIGHT: 64 IN

## 2024-07-31 DIAGNOSIS — E11.40 TYPE 2 DIABETES MELLITUS WITH DIABETIC NEUROPATHY, WITH LONG-TERM CURRENT USE OF INSULIN: Primary | ICD-10-CM

## 2024-07-31 DIAGNOSIS — E78.49 OTHER HYPERLIPIDEMIA: ICD-10-CM

## 2024-07-31 DIAGNOSIS — I10 ESSENTIAL HYPERTENSION: ICD-10-CM

## 2024-07-31 DIAGNOSIS — Z12.31 SCREENING MAMMOGRAM FOR BREAST CANCER: ICD-10-CM

## 2024-07-31 DIAGNOSIS — R07.89 CHEST TIGHTNESS: ICD-10-CM

## 2024-07-31 DIAGNOSIS — I44.1 AV BLOCK, 2ND DEGREE: ICD-10-CM

## 2024-07-31 DIAGNOSIS — R25.1 TREMOR: ICD-10-CM

## 2024-07-31 DIAGNOSIS — H53.2 DIPLOPIA: ICD-10-CM

## 2024-07-31 DIAGNOSIS — Z79.4 TYPE 2 DIABETES MELLITUS WITH DIABETIC NEUROPATHY, WITH LONG-TERM CURRENT USE OF INSULIN: Primary | ICD-10-CM

## 2024-07-31 DIAGNOSIS — F41.9 ANXIETY AND DEPRESSION: ICD-10-CM

## 2024-07-31 DIAGNOSIS — F32.A ANXIETY AND DEPRESSION: ICD-10-CM

## 2024-07-31 DIAGNOSIS — Z79.899 MEDICATION MANAGEMENT: ICD-10-CM

## 2024-07-31 DIAGNOSIS — R00.2 PALPITATION: ICD-10-CM

## 2024-07-31 DIAGNOSIS — E03.8 OTHER SPECIFIED HYPOTHYROIDISM: ICD-10-CM

## 2024-07-31 DIAGNOSIS — R26.89 BALANCE PROBLEM: ICD-10-CM

## 2024-07-31 LAB
ALBUMIN SERPL-MCNC: 4.2 G/DL (ref 3.5–5.2)
ALBUMIN UR-MCNC: 2.3 MG/DL
ALBUMIN/GLOB SERPL: 1.3 G/DL
ALP SERPL-CCNC: 114 U/L (ref 39–117)
ALT SERPL W P-5'-P-CCNC: 18 U/L (ref 1–33)
AMPHET+METHAMPHET UR QL: NEGATIVE
AMPHETAMINE INTERNAL CONTROL: NORMAL
AMPHETAMINES UR QL: NEGATIVE
ANION GAP SERPL CALCULATED.3IONS-SCNC: 13 MMOL/L (ref 5–15)
AST SERPL-CCNC: 18 U/L (ref 1–32)
BARBITURATE INTERNAL CONTROL: NORMAL
BARBITURATES UR QL SCN: NEGATIVE
BASOPHILS # BLD AUTO: 0.07 10*3/MM3 (ref 0–0.2)
BASOPHILS NFR BLD AUTO: 0.8 % (ref 0–1.5)
BENZODIAZ UR QL SCN: NEGATIVE
BENZODIAZEPINE INTERNAL CONTROL: NORMAL
BILIRUB SERPL-MCNC: 0.4 MG/DL (ref 0–1.2)
BUN SERPL-MCNC: 15 MG/DL (ref 6–20)
BUN/CREAT SERPL: 13.5 (ref 7–25)
BUPRENORPHINE INTERNAL CONTROL: NORMAL
BUPRENORPHINE SERPL-MCNC: NEGATIVE NG/ML
CALCIUM SPEC-SCNC: 9.5 MG/DL (ref 8.6–10.5)
CANNABINOIDS SERPL QL: NEGATIVE
CHLORIDE SERPL-SCNC: 97 MMOL/L (ref 98–107)
CHOLEST SERPL-MCNC: 148 MG/DL (ref 0–200)
CO2 SERPL-SCNC: 25 MMOL/L (ref 22–29)
COCAINE INTERNAL CONTROL: NORMAL
COCAINE UR QL: NEGATIVE
CREAT SERPL-MCNC: 1.11 MG/DL (ref 0.57–1)
CREAT UR-MCNC: 109.2 MG/DL
DEPRECATED RDW RBC AUTO: 45.3 FL (ref 37–54)
EGFRCR SERPLBLD CKD-EPI 2021: 58.8 ML/MIN/1.73
EOSINOPHIL # BLD AUTO: 0.14 10*3/MM3 (ref 0–0.4)
EOSINOPHIL NFR BLD AUTO: 1.6 % (ref 0.3–6.2)
ERYTHROCYTE [DISTWIDTH] IN BLOOD BY AUTOMATED COUNT: 13.6 % (ref 12.3–15.4)
EXPIRATION DATE: NORMAL
GLOBULIN UR ELPH-MCNC: 3.2 GM/DL
GLUCOSE SERPL-MCNC: 425 MG/DL (ref 65–99)
HBA1C MFR BLD: 9.9 % (ref 4.8–5.6)
HCT VFR BLD AUTO: 45.1 % (ref 34–46.6)
HDLC SERPL-MCNC: 57 MG/DL (ref 40–60)
HGB BLD-MCNC: 14.6 G/DL (ref 12–15.9)
IMM GRANULOCYTES # BLD AUTO: 0.07 10*3/MM3 (ref 0–0.05)
IMM GRANULOCYTES NFR BLD AUTO: 0.8 % (ref 0–0.5)
LDLC SERPL CALC-MCNC: 71 MG/DL (ref 0–100)
LDLC/HDLC SERPL: 1.21 {RATIO}
LYMPHOCYTES # BLD AUTO: 1.48 10*3/MM3 (ref 0.7–3.1)
LYMPHOCYTES NFR BLD AUTO: 17.2 % (ref 19.6–45.3)
Lab: NORMAL
MCH RBC QN AUTO: 29.6 PG (ref 26.6–33)
MCHC RBC AUTO-ENTMCNC: 32.4 G/DL (ref 31.5–35.7)
MCV RBC AUTO: 91.5 FL (ref 79–97)
MDMA (ECSTASY) INTERNAL CONTROL: NORMAL
MDMA UR QL SCN: NEGATIVE
METHADONE INTERNAL CONTROL: NORMAL
METHADONE UR QL SCN: NEGATIVE
METHAMPHETAMINE INTERNAL CONTROL: NORMAL
MICROALBUMIN/CREAT UR: 21.1 MG/G (ref 0–29)
MONOCYTES # BLD AUTO: 0.47 10*3/MM3 (ref 0.1–0.9)
MONOCYTES NFR BLD AUTO: 5.5 % (ref 5–12)
MORPHINE INTERNAL CONTROL: NORMAL
MORPHINE/OPIATES SCREEN, URINE: NEGATIVE
NEUTROPHILS NFR BLD AUTO: 6.37 10*3/MM3 (ref 1.7–7)
NEUTROPHILS NFR BLD AUTO: 74.1 % (ref 42.7–76)
NRBC BLD AUTO-RTO: 0 /100 WBC (ref 0–0.2)
OXYCODONE INTERNAL CONTROL: NORMAL
OXYCODONE UR QL SCN: NEGATIVE
PCP UR QL SCN: NEGATIVE
PHENCYCLIDINE INTERNAL CONTROL: NORMAL
PLATELET # BLD AUTO: 316 10*3/MM3 (ref 140–450)
PMV BLD AUTO: 9.7 FL (ref 6–12)
POTASSIUM SERPL-SCNC: 4.1 MMOL/L (ref 3.5–5.2)
PROT SERPL-MCNC: 7.4 G/DL (ref 6–8.5)
RBC # BLD AUTO: 4.93 10*6/MM3 (ref 3.77–5.28)
SODIUM SERPL-SCNC: 135 MMOL/L (ref 136–145)
THC INTERNAL CONTROL: NORMAL
TRIGL SERPL-MCNC: 111 MG/DL (ref 0–150)
TSH SERPL DL<=0.05 MIU/L-ACNC: 0.93 UIU/ML (ref 0.27–4.2)
VLDLC SERPL-MCNC: 20 MG/DL (ref 5–40)
WBC NRBC COR # BLD AUTO: 8.6 10*3/MM3 (ref 3.4–10.8)

## 2024-07-31 PROCEDURE — 83036 HEMOGLOBIN GLYCOSYLATED A1C: CPT | Performed by: STUDENT IN AN ORGANIZED HEALTH CARE EDUCATION/TRAINING PROGRAM

## 2024-07-31 PROCEDURE — 82570 ASSAY OF URINE CREATININE: CPT | Performed by: STUDENT IN AN ORGANIZED HEALTH CARE EDUCATION/TRAINING PROGRAM

## 2024-07-31 PROCEDURE — 3074F SYST BP LT 130 MM HG: CPT | Performed by: STUDENT IN AN ORGANIZED HEALTH CARE EDUCATION/TRAINING PROGRAM

## 2024-07-31 PROCEDURE — 82043 UR ALBUMIN QUANTITATIVE: CPT | Performed by: STUDENT IN AN ORGANIZED HEALTH CARE EDUCATION/TRAINING PROGRAM

## 2024-07-31 PROCEDURE — 80061 LIPID PANEL: CPT | Performed by: STUDENT IN AN ORGANIZED HEALTH CARE EDUCATION/TRAINING PROGRAM

## 2024-07-31 PROCEDURE — 99214 OFFICE O/P EST MOD 30 MIN: CPT | Performed by: STUDENT IN AN ORGANIZED HEALTH CARE EDUCATION/TRAINING PROGRAM

## 2024-07-31 PROCEDURE — 80050 GENERAL HEALTH PANEL: CPT | Performed by: STUDENT IN AN ORGANIZED HEALTH CARE EDUCATION/TRAINING PROGRAM

## 2024-07-31 PROCEDURE — 3079F DIAST BP 80-89 MM HG: CPT | Performed by: STUDENT IN AN ORGANIZED HEALTH CARE EDUCATION/TRAINING PROGRAM

## 2024-07-31 RX ORDER — BUSPIRONE HYDROCHLORIDE 5 MG/1
5 TABLET ORAL 3 TIMES DAILY
Qty: 90 TABLET | Refills: 1 | Status: SHIPPED | OUTPATIENT
Start: 2024-07-31

## 2024-08-01 ENCOUNTER — TELEPHONE (OUTPATIENT)
Dept: INTERNAL MEDICINE | Facility: CLINIC | Age: 54
End: 2024-08-01
Payer: COMMERCIAL

## 2024-08-01 NOTE — TELEPHONE ENCOUNTER
Monserrat from [facility] called to give a critical. Glucose was 425.

## 2024-08-04 DIAGNOSIS — Z79.4 TYPE 2 DIABETES MELLITUS WITH DIABETIC NEUROPATHY, WITH LONG-TERM CURRENT USE OF INSULIN: ICD-10-CM

## 2024-08-04 DIAGNOSIS — E11.40 TYPE 2 DIABETES MELLITUS WITH DIABETIC NEUROPATHY, WITH LONG-TERM CURRENT USE OF INSULIN: ICD-10-CM

## 2024-08-06 ENCOUNTER — OFFICE VISIT (OUTPATIENT)
Dept: CARDIOLOGY | Facility: CLINIC | Age: 54
End: 2024-08-06
Payer: COMMERCIAL

## 2024-08-06 VITALS
DIASTOLIC BLOOD PRESSURE: 85 MMHG | SYSTOLIC BLOOD PRESSURE: 139 MMHG | BODY MASS INDEX: 39.27 KG/M2 | HEART RATE: 93 BPM | WEIGHT: 230 LBS | HEIGHT: 64 IN

## 2024-08-06 DIAGNOSIS — R55 SYNCOPE AND COLLAPSE: Primary | ICD-10-CM

## 2024-08-06 DIAGNOSIS — Z86.711 HISTORY OF PULMONARY EMBOLUS (PE): ICD-10-CM

## 2024-08-06 DIAGNOSIS — E11.40 TYPE 2 DIABETES MELLITUS WITH DIABETIC NEUROPATHY, WITH LONG-TERM CURRENT USE OF INSULIN: ICD-10-CM

## 2024-08-06 DIAGNOSIS — R07.2 PRECORDIAL CHEST PAIN: ICD-10-CM

## 2024-08-06 DIAGNOSIS — I10 ESSENTIAL HYPERTENSION: ICD-10-CM

## 2024-08-06 DIAGNOSIS — Z79.01 CHRONIC ANTICOAGULATION: ICD-10-CM

## 2024-08-06 DIAGNOSIS — Z79.4 TYPE 2 DIABETES MELLITUS WITH DIABETIC NEUROPATHY, WITH LONG-TERM CURRENT USE OF INSULIN: ICD-10-CM

## 2024-08-06 PROCEDURE — 93000 ELECTROCARDIOGRAM COMPLETE: CPT | Performed by: INTERNAL MEDICINE

## 2024-08-06 PROCEDURE — 3075F SYST BP GE 130 - 139MM HG: CPT | Performed by: INTERNAL MEDICINE

## 2024-08-06 PROCEDURE — 99204 OFFICE O/P NEW MOD 45 MIN: CPT | Performed by: INTERNAL MEDICINE

## 2024-08-06 PROCEDURE — 3079F DIAST BP 80-89 MM HG: CPT | Performed by: INTERNAL MEDICINE

## 2024-08-06 RX ORDER — GABAPENTIN 300 MG/1
CAPSULE ORAL
Qty: 90 CAPSULE | OUTPATIENT
Start: 2024-08-06

## 2024-08-06 NOTE — TELEPHONE ENCOUNTER
Caller: Carmelina Saldana    Relationship: Self    Best call back number:[phone]     Requested Prescriptions:   Requested Prescriptions     Pending Prescriptions Disp Refills    gabapentin (NEURONTIN) 300 MG capsule [Pharmacy Med Name: GABAPENTIN 300MG CAPSULES] 90 capsule      Sig: TAKE 1 CAPSULE BY MOUTH THREE TIMES DAILY        Pharmacy where request should be sent: [id number] - [facility] [place] - [address] AT [facility] [phone] [facility] [phone]      Last office visit with prescribing clinician: 7/31/2024   Last telemedicine visit with prescribing clinician: Visit date not found   Next office visit with prescribing clinician: 10/2/2024     Additional details provided by patient:OUT OF MEDICATION     Does the patient have less than a 3 day supply:  [x] Yes  [] No    Would you like a call back once the refill request has been completed: [] Yes [] No    If the office needs to give you a call back, can they leave a voicemail: [] Yes [] No    Juan A Morris Rep   08/06/24 13:17 EDT

## 2024-08-06 NOTE — TELEPHONE ENCOUNTER
Refill Request for Controlled Substance    Date of Request: 8/6/2024  Medication Requested: Gabapentin   Last UDS: 07/31/2024  Last Consent: 07/31/2024  Last OV: 07/31/2024  Next OV: 10/02/2024

## 2024-08-06 NOTE — PROGRESS NOTES
"  CARDIOLOGY INITIAL CONSULT       Chief Complaint  Palpitations and Hypertension    Subjective            Carmelina Saldana presents to [facility]  History of Present Illness      The patient has past medical history of Type 2 DM, hyperlipidemia, hypertension, hypothyroidism and history of pulmonary embolism, anticoagulated. She has episodes of syncope , at least 2 events recently. She also reports chest discomfort and progressive shortness of breath. Her EKG showed normal sinus rhythm with Left Anterior Fascicular Block. The CTA of the chest was unremarkable for PE and showed mild coronary calcifications.        Past History:    Medical History:  Past Medical History:   Diagnosis Date    Acute deep vein thrombosis (DVT) of right popliteal vein     Anemia     Anxiety     Arthritis     Bilateral pulmonary embolism     Hypertension     Legally blind     Neuropathy     some pains at night in the feet    Obesity     Pleuritic chest pain     Retinopathy     no retinopathy but does have \"blood behind the eyes\"; going for injections and laser treatment; she states her vision has improved some and she will be undergoing laser treatment    Thyroid disease     removed due to thyroid nodules     Type 2 diabetes mellitus        Surgical History: has a past surgical history that includes US Guided Fine Needle Aspiration (03/02/2020); Cholecystectomy; Hysterectomy; Thyroid surgery; and Eye surgery (Left, 09/28/2022).     Family History: family history includes Diabetes type II in her mother and another family member; Stroke in her father.     Social History: reports that she has never smoked. She has never been exposed to tobacco smoke. She has never used smokeless tobacco. She reports that she does not currently use alcohol. She reports that she does not use drugs.    Allergies: Patient has no known allergies.    Current Outpatient Medications on File Prior to Visit   Medication Sig    albuterol " (ACCUNEB) 0.63 MG/3ML nebulizer solution Take 3 mL by nebulization Every 6 (Six) Hours As Needed for Wheezing.    albuterol sulfate  (90 Base) MCG/ACT inhaler Inhale 2 puffs Every 4 (Four) Hours As Needed for Wheezing or Shortness of Air.    amLODIPine-benazepril (LOTREL) 10-40 MG per capsule TAKE 1 CAPSULE BY MOUTH DAILY    atorvastatin (LIPITOR) 10 MG tablet Take 1 tablet by mouth Daily.    Blood Glucose Monitoring Suppl kit Monitor kit - instructed on use    busPIRone (BUSPAR) 5 MG tablet Take 1 tablet by mouth 3 (Three) Times a Day.    chlorthalidone (HYGROTON) 25 MG tablet TAKE 1 TABLET BY MOUTH EVERY DAY    DULoxetine (CYMBALTA) 60 MG capsule Take 1 capsule by mouth Daily.    Eliquis 5 MG tablet tablet TAKE 1 TABLET BY MOUTH TWICE DAILY    gabapentin (NEURONTIN) 300 MG capsule TAKE 1 CAPSULE BY MOUTH THREE TIMES DAILY    glucose blood test strip Use to check blood sugar 4 times per day    hydrOXYzine (ATARAX) 25 MG tablet TAKE 1 TABLET BY MOUTH THREE TIMES DAILY AS NEEDED FOR ANXIETY    Insulin Aspart FlexPen 100 UNIT/ML solution pen-injector INJECT 5 UNITS UNDER THE SKIN THREE TIMES DAILY PRIOR TO MEALS, WITH ADDITIONAL SLIDING SCALE AS DIRECTED; MAX UNITS PER DAY IS 60    Insulin Glargine (LANTUS SOLOSTAR) 100 UNIT/ML injection pen Inject 40 Units under the skin into the appropriate area as directed Daily.    Lancets (onetouch ultrasoft) lancets Use as directed to check blood sugar 4 times per day    levothyroxine (SYNTHROID, LEVOTHROID) 200 MCG tablet TAKE 1 TABLET BY MOUTH EVERY DAY    liothyronine (CYTOMEL) 5 MCG tablet Take 1 tablet by mouth 2 (Two) Times a Day.    pantoprazole (PROTONIX) 40 MG EC tablet TAKE 1 TABLET BY MOUTH EVERY DAY    rOPINIRole (REQUIP) 0.25 MG tablet Take 1 tablet by mouth Every Night.    Semaglutide, 1 MG/DOSE, (Ozempic, 1 MG/DOSE,) 4 MG/3ML solution pen-injector INJECT 1MG SUBCUTANEOUS ONCE WEEKLY FOR 9-12    sertraline (Zoloft) 25 MG tablet Take 3 tablets by mouth Daily.  "Take 75 mg by mouth daily (one 25 mg and one 50 mg).    sucralfate (Carafate) 1 g tablet Take 1 tablet by mouth 4 (Four) Times a Day.    methylPREDNISolone (MEDROL) 4 MG dose pack Take as directed on package instructions. (Patient not taking: Reported on 8/6/2024)    promethazine-dextromethorphan (PROMETHAZINE-DM) 6.25-15 MG/5ML syrup Take 5 mL by mouth 4 (Four) Times a Day As Needed for Cough. (Patient not taking: Reported on 7/31/2024)     No current facility-administered medications on file prior to visit.          Review of Systems : All systems were reviewed and denied except for syncope and chest discomfort.     Objective     /85 (BP Location: Right arm)   Pulse 93   Ht 162.6 cm (64\")   Wt 104 kg (230 lb)   BMI 39.48 kg/m²       Physical Exam  Alert, oriented  Neck. No JVD, no bruits.  Heart. Regular, no gallops, no murmurs.  Lungs: no rales, no wheezing.  Abdomen: soft, bs+  LE no edema, varicosities.  Neurologic. No motor deficits.   Orthostatics:   Supine   159/90 mmHg  HR 98  Sitting   159/88 mmHg    Standing  129/90 mmHg        Result Review :     The following data was reviewed by: Geovanny Crain MD on 08/06/2024:    CMP          3/14/2024    16:00 7/13/2024    20:19 7/31/2024    14:11   CMP   Glucose 186  303  425    BUN 24  21  15    Creatinine 1.40  1.35  1.11    EGFR 44.5  46.5  58.8    Sodium 136  135  135    Potassium 4.6  3.6  4.1    Chloride 97  94  97    Calcium 9.3  9.6  9.5    Total Protein 7.4  7.5  7.4    Albumin 4.1  4.3  4.2    Globulin 3.3  3.2  3.2    Total Bilirubin 0.5  0.7  0.4    Alkaline Phosphatase 123  122  114    AST (SGOT) 30  20  18    ALT (SGPT) 34  21  18    Albumin/Globulin Ratio 1.2  1.3  1.3    BUN/Creatinine Ratio 17.1  15.6  13.5    Anion Gap 11.9  13.1  13.0      CBC          3/14/2024    16:00 7/13/2024    20:19 7/31/2024    14:11   CBC   WBC 12.17  13.81  8.60    RBC 5.03  5.27  4.93    Hemoglobin 15.0  15.8  14.6    Hematocrit 45.3  44.7  45.1  "   MCV 90.1  84.8  91.5    MCH 29.8  30.0  29.6    MCHC 33.1  35.3  32.4    RDW 12.9  13.9  13.6    Platelets 293  322  316      TSH          12/15/2023    12:18 3/14/2024    16:00 7/31/2024    14:11   TSH   TSH 7.890  0.833  0.931      Lipid Panel          7/31/2024    14:11   Lipid Panel   Total Cholesterol 148    Triglycerides 111    HDL Cholesterol 57    VLDL Cholesterol 20    LDL Cholesterol  71    LDL/HDL Ratio 1.21         Data reviewed: Cardiology studies              ECG 12 Lead    Date/Time: 8/6/2024 11:57 AM  Performed by: Geovanny David MD    Authorized by: Geovanny David MD  Rhythm: sinus rhythm  Conduction: left anterior fascicular block  Other findings: T wave abnormality    Clinical impression: abnormal EKG              Assessment and Plan        Diagnoses and all orders for this visit:    1. Syncope and collapse (Primary)  -     Adult Transthoracic Echo Complete W/ Cont if Necessary Per Protocol; Future  -     Holter Monitor - 72 Hour Up To 15 Days; Future  -     Stress Test With Myocardial Perfusion One Day; Future    2. Essential hypertension    3. History of pulmonary embolus (PE)    4. Chronic anticoagulation    5. Precordial chest pain  -     Stress Test With Myocardial Perfusion One Day; Future        The patient had episodes of syncope and intermittent episodes of chest tightness and shortness of breath. Her symptoms has features of typical and atypical angina. Her EKG is abnormal . I would obtain a Pharmacological Nuclear Stress Test to evaluate for possible obstructive CAD and to determine presence of ischemia. Will review a 2 DECHO to assess wall motion and cardiac function. I would proceed with a cardiac monitor for 10 days to evaluate for arrhythmias. Advised to take the Amlodipine/benazepril at bedtime and keep home BP logs. Advised to wear compression stockings daily. Continue with lifestyle modification. Advised to walk at least 45 minutes per day 5 times per week. If  she develops  recurrent syncope or worsening symptoms, advised to go to the ED or to call 911.       I spent 45 minutes caring for Carmelina on this date of service. This time includes time spent by me in the following activities:reviewing tests, obtaining and/or reviewing a separately obtained history, performing a medically appropriate examination and/or evaluation , ordering medications, tests, or procedures, and documenting information in the medical record    Follow Up     No follow-ups on file.    Patient was given instructions and counseling regarding her condition or for health maintenance advice. Please see specific information pulled into the AVS if appropriate.

## 2024-08-07 ENCOUNTER — HOSPITAL ENCOUNTER (OUTPATIENT)
Dept: CARDIOLOGY | Facility: HOSPITAL | Age: 54
Discharge: HOME | End: 2024-08-07
Admitting: INTERNAL MEDICINE
Payer: COMMERCIAL

## 2024-08-07 DIAGNOSIS — R55 SYNCOPE AND COLLAPSE: ICD-10-CM

## 2024-08-07 PROCEDURE — 93356 MYOCRD STRAIN IMG SPCKL TRCK: CPT

## 2024-08-07 PROCEDURE — 93306 TTE W/DOPPLER COMPLETE: CPT

## 2024-08-09 DIAGNOSIS — F32.A ANXIETY AND DEPRESSION: ICD-10-CM

## 2024-08-09 DIAGNOSIS — F41.9 ANXIETY AND DEPRESSION: ICD-10-CM

## 2024-08-09 DIAGNOSIS — E78.49 OTHER HYPERLIPIDEMIA: ICD-10-CM

## 2024-08-09 LAB
BH CV ECHO LEFT VENTRICLE GLOBAL LONGITUDINAL STRAIN: -18.3 %
BH CV ECHO MEAS - ACS: 1.65 CM
BH CV ECHO MEAS - AO MAX PG: 5.2 MMHG
BH CV ECHO MEAS - AO MEAN PG: 3.3 MMHG
BH CV ECHO MEAS - AO ROOT DIAM: 2.9 CM
BH CV ECHO MEAS - AO V2 MAX: 114 CM/SEC
BH CV ECHO MEAS - AO V2 VTI: 19.3 CM
BH CV ECHO MEAS - AVA(I,D): 3.9 CM2
BH CV ECHO MEAS - EDV(CUBED): 44.5 ML
BH CV ECHO MEAS - EDV(MOD-SP2): 35.9 ML
BH CV ECHO MEAS - EDV(MOD-SP4): 36.6 ML
BH CV ECHO MEAS - EF(MOD-BP): 57.4 %
BH CV ECHO MEAS - EF(MOD-SP2): 62.4 %
BH CV ECHO MEAS - EF(MOD-SP4): 47.8 %
BH CV ECHO MEAS - ESV(CUBED): 10.9 ML
BH CV ECHO MEAS - ESV(MOD-SP2): 13.5 ML
BH CV ECHO MEAS - ESV(MOD-SP4): 19.1 ML
BH CV ECHO MEAS - FS: 37.5 %
BH CV ECHO MEAS - IVS/LVPW: 1.12 CM
BH CV ECHO MEAS - IVSD: 1.21 CM
BH CV ECHO MEAS - LA DIMENSION: 4.1 CM
BH CV ECHO MEAS - LAT PEAK E' VEL: 11.9 CM/SEC
BH CV ECHO MEAS - LV DIASTOLIC VOL/BSA (35-75): 17.6 CM2
BH CV ECHO MEAS - LV MASS(C)D: 128.3 GRAMS
BH CV ECHO MEAS - LV MAX PG: 5.4 MMHG
BH CV ECHO MEAS - LV MEAN PG: 3.1 MMHG
BH CV ECHO MEAS - LV SYSTOLIC VOL/BSA (12-30): 9.2 CM2
BH CV ECHO MEAS - LV V1 MAX: 116.5 CM/SEC
BH CV ECHO MEAS - LV V1 VTI: 24.6 CM
BH CV ECHO MEAS - LVIDD: 3.5 CM
BH CV ECHO MEAS - LVIDS: 2.21 CM
BH CV ECHO MEAS - LVOT AREA: 3 CM2
BH CV ECHO MEAS - LVOT DIAM: 1.97 CM
BH CV ECHO MEAS - LVPWD: 1.08 CM
BH CV ECHO MEAS - MED PEAK E' VEL: 7.3 CM/SEC
BH CV ECHO MEAS - MV A MAX VEL: 91.3 CM/SEC
BH CV ECHO MEAS - MV DEC SLOPE: 466.1 CM/SEC2
BH CV ECHO MEAS - MV DEC TIME: 0.17 SEC
BH CV ECHO MEAS - MV E MAX VEL: 77.6 CM/SEC
BH CV ECHO MEAS - MV E/A: 0.85
BH CV ECHO MEAS - MV MAX PG: 3.5 MMHG
BH CV ECHO MEAS - MV MEAN PG: 1.94 MMHG
BH CV ECHO MEAS - MV V2 VTI: 17.7 CM
BH CV ECHO MEAS - MVA(VTI): 4.2 CM2
BH CV ECHO MEAS - PA V2 MAX: 111.3 CM/SEC
BH CV ECHO MEAS - RV MAX PG: 2.9 MMHG
BH CV ECHO MEAS - RV V1 MAX: 85.3 CM/SEC
BH CV ECHO MEAS - RV V1 VTI: 16.1 CM
BH CV ECHO MEAS - RVDD: 3.6 CM
BH CV ECHO MEAS - SV(LVOT): 75.1 ML
BH CV ECHO MEAS - SV(MOD-SP2): 22.4 ML
BH CV ECHO MEAS - SV(MOD-SP4): 17.5 ML
BH CV ECHO MEAS - SVI(LVOT): 36.2 ML/M2
BH CV ECHO MEAS - SVI(MOD-SP2): 10.8 ML/M2
BH CV ECHO MEAS - SVI(MOD-SP4): 8.4 ML/M2
BH CV ECHO MEAS - TAPSE (>1.6): 1.43 CM
BH CV ECHO MEASUREMENTS AVERAGE E/E' RATIO: 8.08
BH CV XLRA - TDI S': 10.3 CM/SEC

## 2024-08-09 RX ORDER — ROPINIROLE 0.25 MG/1
0.25 TABLET, FILM COATED ORAL NIGHTLY
Qty: 90 TABLET | Refills: 1 | Status: SHIPPED | OUTPATIENT
Start: 2024-08-09

## 2024-08-09 RX ORDER — ATORVASTATIN CALCIUM 10 MG/1
10 TABLET, FILM COATED ORAL DAILY
Qty: 90 TABLET | Refills: 0 | Status: SHIPPED | OUTPATIENT
Start: 2024-08-09

## 2024-08-24 ENCOUNTER — APPOINTMENT (OUTPATIENT)
Dept: GENERAL RADIOLOGY | Facility: HOSPITAL | Age: 54
End: 2024-08-24
Payer: COMMERCIAL

## 2024-08-24 ENCOUNTER — HOSPITAL ENCOUNTER (EMERGENCY)
Facility: HOSPITAL | Age: 54
Discharge: HOME | End: 2024-08-25
Attending: EMERGENCY MEDICINE | Admitting: EMERGENCY MEDICINE
Payer: COMMERCIAL

## 2024-08-24 DIAGNOSIS — R07.9 CHEST PAIN, UNSPECIFIED TYPE: Primary | ICD-10-CM

## 2024-08-24 LAB
BASOPHILS # BLD AUTO: 0.1 10*3/MM3 (ref 0–0.2)
BASOPHILS NFR BLD AUTO: 0.9 % (ref 0–1.5)
DEPRECATED RDW RBC AUTO: 46.1 FL (ref 37–54)
EOSINOPHIL # BLD AUTO: 0.2 10*3/MM3 (ref 0–0.4)
EOSINOPHIL NFR BLD AUTO: 1.8 % (ref 0.3–6.2)
ERYTHROCYTE [DISTWIDTH] IN BLOOD BY AUTOMATED COUNT: 14.1 % (ref 12.3–15.4)
HCT VFR BLD AUTO: 42.6 % (ref 34–46.6)
HGB BLD-MCNC: 14.3 G/DL (ref 12–15.9)
IMM GRANULOCYTES # BLD AUTO: 0.06 10*3/MM3 (ref 0–0.05)
IMM GRANULOCYTES NFR BLD AUTO: 0.5 % (ref 0–0.5)
LYMPHOCYTES # BLD AUTO: 2.81 10*3/MM3 (ref 0.7–3.1)
LYMPHOCYTES NFR BLD AUTO: 24.7 % (ref 19.6–45.3)
MCH RBC QN AUTO: 30.2 PG (ref 26.6–33)
MCHC RBC AUTO-ENTMCNC: 33.6 G/DL (ref 31.5–35.7)
MCV RBC AUTO: 90.1 FL (ref 79–97)
MONOCYTES # BLD AUTO: 0.58 10*3/MM3 (ref 0.1–0.9)
MONOCYTES NFR BLD AUTO: 5.1 % (ref 5–12)
NEUTROPHILS NFR BLD AUTO: 67 % (ref 42.7–76)
NEUTROPHILS NFR BLD AUTO: 7.61 10*3/MM3 (ref 1.7–7)
NRBC BLD AUTO-RTO: 0 /100 WBC (ref 0–0.2)
PLATELET # BLD AUTO: 332 10*3/MM3 (ref 140–450)
PMV BLD AUTO: 9.2 FL (ref 6–12)
RBC # BLD AUTO: 4.73 10*6/MM3 (ref 3.77–5.28)
WBC NRBC COR # BLD AUTO: 11.36 10*3/MM3 (ref 3.4–10.8)

## 2024-08-24 PROCEDURE — 80053 COMPREHEN METABOLIC PANEL: CPT | Performed by: EMERGENCY MEDICINE

## 2024-08-24 PROCEDURE — 36415 COLL VENOUS BLD VENIPUNCTURE: CPT

## 2024-08-24 PROCEDURE — 83735 ASSAY OF MAGNESIUM: CPT | Performed by: EMERGENCY MEDICINE

## 2024-08-24 PROCEDURE — 83880 ASSAY OF NATRIURETIC PEPTIDE: CPT | Performed by: EMERGENCY MEDICINE

## 2024-08-24 PROCEDURE — 85025 COMPLETE CBC W/AUTO DIFF WBC: CPT | Performed by: EMERGENCY MEDICINE

## 2024-08-24 PROCEDURE — 96375 TX/PRO/DX INJ NEW DRUG ADDON: CPT

## 2024-08-24 PROCEDURE — 93005 ELECTROCARDIOGRAM TRACING: CPT

## 2024-08-24 PROCEDURE — 84484 ASSAY OF TROPONIN QUANT: CPT | Performed by: EMERGENCY MEDICINE

## 2024-08-24 PROCEDURE — 83690 ASSAY OF LIPASE: CPT | Performed by: EMERGENCY MEDICINE

## 2024-08-24 PROCEDURE — 99284 EMERGENCY DEPT VISIT MOD MDM: CPT

## 2024-08-24 PROCEDURE — 96374 THER/PROPH/DIAG INJ IV PUSH: CPT

## 2024-08-24 PROCEDURE — 71045 X-RAY EXAM CHEST 1 VIEW: CPT

## 2024-08-24 RX ORDER — SODIUM CHLORIDE 0.9 % (FLUSH) 0.9 %
10 SYRINGE (ML) INJECTION AS NEEDED
Status: DISCONTINUED | OUTPATIENT
Start: 2024-08-24 | End: 2024-08-25 | Stop reason: HOSPADM

## 2024-08-24 RX ORDER — ASPIRIN 81 MG/1
324 TABLET, CHEWABLE ORAL ONCE
Status: COMPLETED | OUTPATIENT
Start: 2024-08-25 | End: 2024-08-25

## 2024-08-25 VITALS
WEIGHT: 227.74 LBS | RESPIRATION RATE: 14 BRPM | SYSTOLIC BLOOD PRESSURE: 113 MMHG | HEIGHT: 64 IN | BODY MASS INDEX: 38.88 KG/M2 | HEART RATE: 93 BPM | DIASTOLIC BLOOD PRESSURE: 68 MMHG | TEMPERATURE: 98 F | OXYGEN SATURATION: 96 %

## 2024-08-25 LAB
ALBUMIN SERPL-MCNC: 3.7 G/DL (ref 3.5–5.2)
ALBUMIN/GLOB SERPL: 1 G/DL
ALP SERPL-CCNC: 125 U/L (ref 39–117)
ALT SERPL W P-5'-P-CCNC: 11 U/L (ref 1–33)
ANION GAP SERPL CALCULATED.3IONS-SCNC: 12.6 MMOL/L (ref 5–15)
AST SERPL-CCNC: 13 U/L (ref 1–32)
BILIRUB SERPL-MCNC: 0.3 MG/DL (ref 0–1.2)
BUN SERPL-MCNC: 17 MG/DL (ref 6–20)
BUN/CREAT SERPL: 14 (ref 7–25)
CALCIUM SPEC-SCNC: 9.3 MG/DL (ref 8.6–10.5)
CHLORIDE SERPL-SCNC: 97 MMOL/L (ref 98–107)
CO2 SERPL-SCNC: 26.4 MMOL/L (ref 22–29)
CREAT SERPL-MCNC: 1.21 MG/DL (ref 0.57–1)
EGFRCR SERPLBLD CKD-EPI 2021: 53 ML/MIN/1.73
GEN 5 2HR TROPONIN T REFLEX: 9 NG/L
GLOBULIN UR ELPH-MCNC: 3.6 GM/DL
GLUCOSE SERPL-MCNC: 380 MG/DL (ref 65–99)
HOLD SPECIMEN: NORMAL
HOLD SPECIMEN: NORMAL
LIPASE SERPL-CCNC: 36 U/L (ref 13–60)
MAGNESIUM SERPL-MCNC: 1.9 MG/DL (ref 1.6–2.6)
NT-PROBNP SERPL-MCNC: 76.6 PG/ML (ref 0–900)
POTASSIUM SERPL-SCNC: 3.3 MMOL/L (ref 3.5–5.2)
PROT SERPL-MCNC: 7.3 G/DL (ref 6–8.5)
SODIUM SERPL-SCNC: 136 MMOL/L (ref 136–145)
TROPONIN T DELTA: 0 NG/L
TROPONIN T SERPL HS-MCNC: 9 NG/L
WHOLE BLOOD HOLD COAG: NORMAL
WHOLE BLOOD HOLD SPECIMEN: NORMAL

## 2024-08-25 PROCEDURE — 96374 THER/PROPH/DIAG INJ IV PUSH: CPT

## 2024-08-25 PROCEDURE — 84484 ASSAY OF TROPONIN QUANT: CPT | Performed by: EMERGENCY MEDICINE

## 2024-08-25 PROCEDURE — 96375 TX/PRO/DX INJ NEW DRUG ADDON: CPT

## 2024-08-25 PROCEDURE — 25010000002 MORPHINE PER 10 MG: Performed by: EMERGENCY MEDICINE

## 2024-08-25 RX ORDER — HYDROCODONE BITARTRATE AND ACETAMINOPHEN 5; 325 MG/1; MG/1
1 TABLET ORAL 4 TIMES DAILY PRN
Qty: 12 TABLET | Refills: 0 | Status: SHIPPED | OUTPATIENT
Start: 2024-08-25

## 2024-08-25 RX ORDER — MORPHINE SULFATE 2 MG/ML
2 INJECTION, SOLUTION INTRAMUSCULAR; INTRAVENOUS ONCE
Status: COMPLETED | OUTPATIENT
Start: 2024-08-25 | End: 2024-08-25

## 2024-08-25 RX ORDER — FAMOTIDINE 10 MG/ML
20 INJECTION, SOLUTION INTRAVENOUS ONCE
Status: COMPLETED | OUTPATIENT
Start: 2024-08-25 | End: 2024-08-25

## 2024-08-25 RX ORDER — FAMOTIDINE 20 MG/1
20 TABLET, FILM COATED ORAL 2 TIMES DAILY
Qty: 30 TABLET | Refills: 0 | Status: SHIPPED | OUTPATIENT
Start: 2024-08-25

## 2024-08-25 RX ADMIN — MORPHINE SULFATE 2 MG: 2 INJECTION, SOLUTION INTRAMUSCULAR; INTRAVENOUS at 01:03

## 2024-08-25 RX ADMIN — ASPIRIN 324 MG: 81 TABLET, CHEWABLE ORAL at 01:03

## 2024-08-25 RX ADMIN — FAMOTIDINE 20 MG: 10 INJECTION INTRAVENOUS at 01:03

## 2024-08-25 NOTE — ED PROVIDER NOTES
"Time: 11:39 PM EDT  Date of encounter:  8/24/2024  Independent Historian/Clinical History and Information was obtained by:   Patient    History is limited by: N/A    Chief Complaint   Patient presents with    Chest Pain         History of Present Illness:  Patient is a 54 y.o. year old female who presents to the emergency department for evaluation of chest pain.  Patient having midsternal chest pain with associated shortness of air for the past 2 hours.  She does state that she is been referred to cardiology but does not have diagnosis yet.  She states she has been having intermittent chest pains for couple months.  Patient admits to associated lightheadedness.  Patient does admit to history of DVT for which she takes Eliquis.  (Provider in triage, Roc Canales PA-C)    Patient Care Team  Primary Care Provider: Guy Beatty MD    Past Medical History:     No Known Allergies  Past Medical History:   Diagnosis Date    Acute deep vein thrombosis (DVT) of right popliteal vein     Anemia     Anxiety     Arthritis     Bilateral pulmonary embolism     Hypertension     Legally blind     Neuropathy     some pains at night in the feet    Obesity     Pleuritic chest pain     Retinopathy     no retinopathy but does have \"blood behind the eyes\"; going for injections and laser treatment; she states her vision has improved some and she will be undergoing laser treatment    Thyroid disease     removed due to thyroid nodules     Type 2 diabetes mellitus      Past Surgical History:   Procedure Laterality Date    CHOLECYSTECTOMY      EYE SURGERY Left 09/28/2022    HYSTERECTOMY      PARTIAL    THYROID SURGERY      US GUIDED FINE NEEDLE ASPIRATION  03/02/2020     Family History   Problem Relation Age of Onset    Diabetes type II Mother     Stroke Father     Diabetes type II Other         grandparents       Home Medications:  Prior to Admission medications    Medication Sig Start Date End Date Taking? Authorizing Provider "   albuterol (ACCUNEB) 0.63 MG/3ML nebulizer solution Take 3 mL by nebulization Every 6 (Six) Hours As Needed for Wheezing. 7/13/24   Megan Cloud PA-C   albuterol sulfate  (90 Base) MCG/ACT inhaler Inhale 2 puffs Every 4 (Four) Hours As Needed for Wheezing or Shortness of Air. 6/9/24   Fatmata Sam APRN   amLODIPine-benazepril (LOTREL) 10-40 MG per capsule TAKE 1 CAPSULE BY MOUTH DAILY 6/7/24 6/7/25  Guy Beatty MD   atorvastatin (LIPITOR) 10 MG tablet TAKE 1 TABLET BY MOUTH DAILY 8/9/24   Guy Beatty MD   Blood Glucose Monitoring Suppl kit Monitor kit - instructed on use 7/3/24   Guy Beatty MD   busPIRone (BUSPAR) 5 MG tablet Take 1 tablet by mouth 3 (Three) Times a Day. 7/31/24   Guy Beatty MD   chlorthalidone (HYGROTON) 25 MG tablet TAKE 1 TABLET BY MOUTH EVERY DAY 9/21/23   Guy Beatty MD   DULoxetine (CYMBALTA) 60 MG capsule Take 1 capsule by mouth Daily. 6/7/24   Guy Beatty MD   Eliquis 5 MG tablet tablet TAKE 1 TABLET BY MOUTH TWICE DAILY 7/8/24   Guy Beatty MD   gabapentin (NEURONTIN) 300 MG capsule TAKE 1 CAPSULE BY MOUTH THREE TIMES DAILY 1/3/24   Guy Beatty MD   glucose blood test strip Use to check blood sugar 4 times per day 7/3/24   Guy Beatty MD   hydrOXYzine (ATARAX) 25 MG tablet TAKE 1 TABLET BY MOUTH THREE TIMES DAILY AS NEEDED FOR ANXIETY 5/16/24   Guy Beatty MD   Insulin Aspart FlexPen 100 UNIT/ML solution pen-injector INJECT 5 UNITS UNDER THE SKIN THREE TIMES DAILY PRIOR TO MEALS, WITH ADDITIONAL SLIDING SCALE AS DIRECTED; MAX UNITS PER DAY IS 60 12/6/23   Guy Beatty MD   Insulin Glargine (LANTUS SOLOSTAR) 100 UNIT/ML injection pen Inject 40 Units under the skin into the appropriate area as directed Daily. 8/4/24   Guy Beatty MD   Lancets (onetouch ultrasoft) lancets Use as directed to check blood sugar 4 times per day 7/3/24   Guy Beatty MD   levothyroxine (SYNTHROID, LEVOTHROID)  200 MCG tablet TAKE 1 TABLET BY MOUTH EVERY DAY 5/20/24   Guy Beatty MD   liothyronine (CYTOMEL) 5 MCG tablet Take 1 tablet by mouth 2 (Two) Times a Day. 12/18/23   Guy Beatty MD   methylPREDNISolone (MEDROL) 4 MG dose pack Take as directed on package instructions.  Patient not taking: Reported on 8/6/2024 6/9/24   Fatmata Sam APRN   pantoprazole (PROTONIX) 40 MG EC tablet TAKE 1 TABLET BY MOUTH EVERY DAY 9/21/23   Guy Beatty MD   promethazine-dextromethorphan (PROMETHAZINE-DM) 6.25-15 MG/5ML syrup Take 5 mL by mouth 4 (Four) Times a Day As Needed for Cough.  Patient not taking: Reported on 7/31/2024 6/9/24   Fatmata Sam APRN   rOPINIRole (REQUIP) 0.25 MG tablet TAKE 1 TABLET BY MOUTH EVERY NIGHT 8/9/24   Guy Beatty MD   Semaglutide, 1 MG/DOSE, (Ozempic, 1 MG/DOSE,) 4 MG/3ML solution pen-injector INJECT 1MG SUBCUTANEOUS ONCE WEEKLY FOR 9-12 2/21/24   Guy Beatty MD   sertraline (Zoloft) 25 MG tablet Take 3 tablets by mouth Daily. Take 75 mg by mouth daily (one 25 mg and one 50 mg). 12/15/23   Guy Beatty MD   sucralfate (Carafate) 1 g tablet Take 1 tablet by mouth 4 (Four) Times a Day. 4/11/24   Guy Beatty MD        Social History:   Social History     Tobacco Use    Smoking status: Never     Passive exposure: Never    Smokeless tobacco: Never   Vaping Use    Vaping status: Never Used   Substance Use Topics    Alcohol use: Not Currently     Comment: OCCASIONAL/SOCIAL    Drug use: Never         Review of Systems:  Review of Systems   Constitutional:  Negative for chills and fever.   HENT:  Negative for congestion, ear pain and sore throat.    Eyes:  Negative for pain.   Respiratory:  Positive for shortness of breath. Negative for cough and chest tightness.    Cardiovascular:  Positive for chest pain.   Gastrointestinal:  Negative for abdominal pain, diarrhea, nausea and vomiting.   Genitourinary:  Negative for flank pain and hematuria.  "  Musculoskeletal:  Negative for joint swelling.   Skin:  Negative for pallor.   Neurological:  Negative for seizures and headaches.   All other systems reviewed and are negative.       Physical Exam:  /68   Pulse 93   Temp 98 °F (36.7 °C) (Oral)   Resp 14   Ht 162.6 cm (64\")   Wt 103 kg (227 lb 11.8 oz)   SpO2 96%   BMI 39.09 kg/m²         Physical Exam  Vitals and nursing note reviewed.   Constitutional:       General: She is not in acute distress.     Appearance: Normal appearance. She is not toxic-appearing.   HENT:      Head: Normocephalic and atraumatic.      Jaw: There is normal jaw occlusion.      Mouth/Throat:      Mouth: Mucous membranes are moist.   Eyes:      General: Lids are normal.      Extraocular Movements: Extraocular movements intact.      Conjunctiva/sclera: Conjunctivae normal.      Pupils: Pupils are equal, round, and reactive to light.   Cardiovascular:      Rate and Rhythm: Normal rate and regular rhythm.      Pulses: Normal pulses.      Heart sounds: Normal heart sounds.   Pulmonary:      Effort: Pulmonary effort is normal. No respiratory distress.      Breath sounds: Normal breath sounds. No wheezing or rhonchi.   Abdominal:      General: Abdomen is flat. There is no distension.      Palpations: Abdomen is soft.      Tenderness: There is no abdominal tenderness. There is no guarding or rebound.   Musculoskeletal:         General: Normal range of motion.      Cervical back: Normal range of motion and neck supple.      Right lower leg: No tenderness. No edema.      Left lower leg: No tenderness. No edema.   Skin:     General: Skin is warm and dry.   Neurological:      General: No focal deficit present.      Mental Status: She is alert and oriented to person, place, and time. Mental status is at baseline.   Psychiatric:         Mood and Affect: Mood normal.         Behavior: Behavior normal.               Procedures:  Procedures      Medical Decision Making:      Comorbidities " that affect care:    Diabetes, neuropathy, thyroid disease, history of DVT, history of pulmonary embolism, anxiety, hypertension, chronic anticoagulation on Eliquis    External Notes reviewed:    Previous Clinic Note: Cardiology visit for syncope 8/6/2024      The following orders were placed and all results were independently analyzed by me:  Orders Placed This Encounter   Procedures    XR Chest 1 View    [place] Draw    High Sensitivity Troponin T    Comprehensive Metabolic Panel    Lipase    BNP    Magnesium    CBC Auto Differential    High Sensitivity Troponin T 2Hr    Continuous Pulse Oximetry    ECG 12 Lead Chest Pain    CBC & Differential    Green Top (Gel)    Lavender Top    Gold Top - SST    Light Blue Top       Medications Given in the Emergency Department:  Medications   aspirin chewable tablet 324 mg (324 mg Oral Given 8/25/24 0103)   morphine injection 2 mg (2 mg Intravenous Given 8/25/24 0103)   famotidine (PEPCID) injection 20 mg (20 mg Intravenous Given 8/25/24 0103)        ED Course:    The patient was initially evaluated in the triage area where orders were placed. The patient was later dispositioned by Vaughn Wilson MD.      The patient was advised to stay for completion of workup which includes but is not limited to communication of labs and radiological results, reassessment and plan. The patient was advised that leaving prior to disposition by a provider could result in critical findings that are not communicated to the patient.     ED Course as of 08/25/24 0651   Sat Aug 24, 2024   2339 PROVIDER IN TRIAGE  Patient was evaluated by me in triage, Roc Canales PA-C.  Orders were placed and patient is currently awaiting final results and disposition.  [MD]      ED Course User Index  [MD] Roc Canales PA-C       Labs:    Lab Results (last 24 hours)       Procedure Component Value Units Date/Time    High Sensitivity Troponin T [[id number]]  (Normal) Collected: 08/24/24 2341    Specimen:  Blood from Arm, Right Updated: 08/25/24 0015     HS Troponin T 9 ng/L     Narrative:      High Sensitive Troponin T Reference Range:  <14.0 ng/L- Negative Female for AMI  <22.0 ng/L- Negative Male for AMI  >=14 - Abnormal Female indicating possible myocardial injury.  >=22 - Abnormal Male indicating possible myocardial injury.   Clinicians would have to utilize clinical acumen, EKG, Troponin, and serial changes to determine if it is an Acute Myocardial Infarction or myocardial injury due to an underlying chronic condition.         CBC & Differential [[id number]]  (Abnormal) Collected: 08/24/24 2341    Specimen: Blood from Arm, Right Updated: 08/24/24 2351    Narrative:      The following orders were created for panel order CBC & Differential.  Procedure                               Abnormality         Status                     ---------                               -----------         ------                     CBC Auto Differential[[id number]]        Abnormal            Final result                 Please view results for these tests on the individual orders.    Comprehensive Metabolic Panel [[id number]]  (Abnormal) Collected: 08/24/24 2341    Specimen: Blood from Arm, Right Updated: 08/25/24 0010     Glucose 380 mg/dL      BUN 17 mg/dL      Creatinine 1.21 mg/dL      Sodium 136 mmol/L      Potassium 3.3 mmol/L      Chloride 97 mmol/L      CO2 26.4 mmol/L      Calcium 9.3 mg/dL      Total Protein 7.3 g/dL      Albumin 3.7 g/dL      ALT (SGPT) 11 U/L      AST (SGOT) 13 U/L      Alkaline Phosphatase 125 U/L      Total Bilirubin 0.3 mg/dL      Globulin 3.6 gm/dL      A/G Ratio 1.0 g/dL      BUN/Creatinine Ratio 14.0     Anion Gap 12.6 mmol/L      eGFR 53.0 mL/min/1.73     Narrative:      GFR Normal >60  Chronic Kidney Disease <60  Kidney Failure <15      Lipase [[id number]]  (Normal) Collected: 08/24/24 2341    Specimen: Blood from Arm, Right Updated: 08/25/24 0010     Lipase 36 U/L     BNP [[id number]]  (Normal)  Collected: 08/24/24 2341    Specimen: Blood from Arm, Right Updated: 08/25/24 0015     proBNP 76.6 pg/mL     Narrative:      This assay is used as an aid in the diagnosis of individuals suspected of having heart failure. It can be used as an aid in the diagnosis of acute decompensated heart failure (ADHF) in patients presenting with signs and symptoms of ADHF to the emergency department (ED). In addition, NT-proBNP of <300 pg/mL indicates ADHF is not likely.    Age Range Result Interpretation  NT-proBNP Concentration (pg/mL:      <50             Positive            >450                   Gray                 300-450                    Negative             <300    50-75           Positive            >900                  Gray                300-900                  Negative            <300      >75             Positive            >1800                  Gray                300-1800                  Negative            <300    Magnesium [[id number]]  (Normal) Collected: 08/24/24 2341    Specimen: Blood from Arm, Right Updated: 08/25/24 0010     Magnesium 1.9 mg/dL     CBC Auto Differential [[id number]]  (Abnormal) Collected: 08/24/24 2341    Specimen: Blood from Arm, Right Updated: 08/24/24 2351     WBC 11.36 10*3/mm3      RBC 4.73 10*6/mm3      Hemoglobin 14.3 g/dL      Hematocrit 42.6 %      MCV 90.1 fL      MCH 30.2 pg      MCHC 33.6 g/dL      RDW 14.1 %      RDW-SD 46.1 fl      MPV 9.2 fL      Platelets 332 10*3/mm3      Neutrophil % 67.0 %      Lymphocyte % 24.7 %      Monocyte % 5.1 %      Eosinophil % 1.8 %      Basophil % 0.9 %      Immature Grans % 0.5 %      Neutrophils, Absolute 7.61 10*3/mm3      Lymphocytes, Absolute 2.81 10*3/mm3      Monocytes, Absolute 0.58 10*3/mm3      Eosinophils, Absolute 0.20 10*3/mm3      Basophils, Absolute 0.10 10*3/mm3      Immature Grans, Absolute 0.06 10*3/mm3      nRBC 0.0 /100 WBC     High Sensitivity Troponin T 2Hr [[id number]]  (Normal) Collected: 08/25/24 0218    Specimen:  Blood Updated: 08/25/24 0239     HS Troponin T 9 ng/L      Troponin T Delta 0 ng/L     Narrative:      High Sensitive Troponin T Reference Range:  <14.0 ng/L- Negative Female for AMI  <22.0 ng/L- Negative Male for AMI  >=14 - Abnormal Female indicating possible myocardial injury.  >=22 - Abnormal Male indicating possible myocardial injury.   Clinicians would have to utilize clinical acumen, EKG, Troponin, and serial changes to determine if it is an Acute Myocardial Infarction or myocardial injury due to an underlying chronic condition.                  Imaging:    XR Chest 1 View    Result Date: 8/24/2024  XR CHEST 1 VW-  Date of exam: 8/24/2024 11:51 PM.  Indication: Chest pain.  Comparison: 7/13/2024.  FINDINGS: A single AP (or PA) upright portable chest radiograph was performed. No cardiac enlargement is seen. No acute infiltrate is appreciated. No pleural effusion or pneumothorax is identified. The patient has undergone cholecystectomy. No significant interval change is seen since the prior study (or studies).       No acute infiltrate is appreciated.    Portions of this note were completed with a voice recognition program.   Electronically Signed By-Chris Siddiqi MD On:8/24/2024 11:59 PM         Differential Diagnosis and Discussion:      Chest Pain:  Based on the patient's signs and symptoms, I considered aortic dissection, myocardial infaction, pulmonary embolism, cardiac tamponade, pericarditis, pneumothorax, musculoskeletal chest pain and other differential diagnosis as an etiology of the patient's chest pain.     All labs were reviewed and interpreted by me.  All X-rays impressions were independently interpreted by me.  EKG was interpreted by me.    MDM  Number of Diagnoses or Management Options  Chest pain, unspecified type  Diagnosis management comments: I reviewed the patient's CT chest from her last ED presentation for similar symptoms which shows no evidence of pulmonary embolism.  I reviewed the  patient's recent echocardiogram which is essentially normal.                 Patient Care Considerations:    CT CHEST: I considered ordering a CT scan of the chest, however patient had CT chest performed approximately 3 weeks ago      Consultants/Shared Management Plan:    None    Social Determinants of Health:    Patient is independent, reliable, and has access to care.       Disposition and Care Coordination:    Discharged: I considered escalation of care by admitting this patient to the hospital, however patient is low risk heart score and pre-existing cardiology follow-up    I have explained the patient´s condition, diagnoses and treatment plan based on the information available to me at this time. I have answered questions and addressed any concerns. The patient has a good  understanding of the patient´s diagnosis, condition, and treatment plan as can be expected at this point. The vital signs have been stable. The patient´s condition is stable and appropriate for discharge from the emergency department.      The patient will pursue further outpatient evaluation with the primary care physician or other designated or consulting physician as outlined in the discharge instructions. They are agreeable to this plan of care and follow-up instructions have been explained in detail. The patient has received these instructions in written format and has expressed an understanding of the discharge instructions. The patient is aware that any significant change in condition or worsening of symptoms should prompt an immediate return to this or the closest emergency department or call to 911.  I have explained discharge medications and the need for follow up with the patient/caretakers. This was also printed in the discharge instructions. Patient was discharged with the following medications and follow up:      Medication List        New Prescriptions      famotidine 20 MG tablet  Commonly known as: Pepcid  Take 1 tablet by  mouth 2 (Two) Times a Day.     HYDROcodone-acetaminophen 5-325 MG per tablet  Commonly known as: NORCO  Take 1 tablet by mouth 4 (Four) Times a Day As Needed for Moderate Pain.               Where to Get Your Medications        These medications were sent to [id number] - [place], [place] - [address] AT [facility] - [phone]  - [phone] FX  [address] [place] [address]      Phone: [phone]   famotidine 20 MG tablet  HYDROcodone-acetaminophen 5-325 MG per tablet      Guy Beatty MD  [address]  [address]  [facility] [address]  [phone]    Schedule an appointment as soon as possible for a visit       Geovanny David MD  [address] [facility]  [address]  [place] [address]  [phone]    Schedule an appointment as soon as possible for a visit          Final diagnoses:   Chest pain, unspecified type        ED Disposition       ED Disposition   Discharge    Condition   Stable    Comment   --               This medical record created using voice recognition software.             Vaughn Wilson MD  08/25/24 1611

## 2024-08-30 ENCOUNTER — TELEPHONE (OUTPATIENT)
Dept: CARDIOLOGY | Facility: CLINIC | Age: 54
End: 2024-08-30
Payer: COMMERCIAL

## 2024-08-30 LAB
QT INTERVAL: 362 MS
QTC INTERVAL: 474 MS

## 2024-08-30 RX ORDER — METOPROLOL SUCCINATE 25 MG/1
25 TABLET, EXTENDED RELEASE ORAL NIGHTLY
Qty: 90 TABLET | Refills: 3 | Status: SHIPPED | OUTPATIENT
Start: 2024-08-30

## 2024-08-30 NOTE — TELEPHONE ENCOUNTER
SW patient. Went over results and recommendations, sent prescription and scheduled f/u after stress test.

## 2024-08-30 NOTE — TELEPHONE ENCOUNTER
----- Message from Tatiana Tovar sent at 8/30/2024 10:32 AM EDT -----  Holter monitor study at baseline she is in a normal rhythm but she does have episodes of atrial fibrillation which is an irregular rhythm from the upper portion of the heart.  Treatment for this includes anticoagulation, please verify if she is still taking Eliquis 5 mg twice daily per previous DVT?  If not we need to send her new prescription.  I would also like for her to start on medication to help control heart rate and rhythm.  Please send prescription for Toprol 25 mg to take 1 tablet nightly.  Please schedule her for an office follow-up with me in the next 1 to 2 weeks to discuss results and treatment more detail.

## 2024-09-05 ENCOUNTER — TELEPHONE (OUTPATIENT)
Dept: CARDIOLOGY | Facility: CLINIC | Age: 54
End: 2024-09-05
Payer: COMMERCIAL

## 2024-09-05 NOTE — TELEPHONE ENCOUNTER
SW patient. Patient was notified by pharmacy that Dr Cj Crain had placed order for amiodarone.     Medication is not on patient medication list. Calling pharmacy to verify. Patient misread medication. Patient verbalized understanding of medications.

## 2024-09-05 NOTE — TELEPHONE ENCOUNTER
Patient called asking about medication metoprolol and amiodarone.     Attempted to call patient back. Left VM with call back number.

## 2024-09-06 ENCOUNTER — TELEPHONE (OUTPATIENT)
Dept: CARDIOLOGY | Facility: CLINIC | Age: 54
End: 2024-09-06
Payer: COMMERCIAL

## 2024-09-06 NOTE — TELEPHONE ENCOUNTER
Caller: WANG    Relationship: SELF    Best call back number: [phone]    Which medication are you concerned about: METOPROLOL SUCCINATE XL    Who prescribed you this medication: YURY FIKN SING    When did you start taking this medication: 8.30.24    What are your concerns:  PATIENT CALLING IN ABOUT MEDICATION - WHEN PATIENT IS GETTING UP FROM SITTING / LAYING PATIENT IS SEE STARS AS IF THEY WERE GOING TO PASS OUT AND FEELING DIZZY - WORSE TODAY.  PATIENT WAS HAVING ISSUES LIKE THIS BEFORE BUT GETTING WORSE SINCE STARTING THE MEDICATION. PLEASE CALL PATIENT TO ADVISE. THANK YOU!    How long have you had these concerns: TODAY

## 2024-09-06 NOTE — TELEPHONE ENCOUNTER
CAMACHO patient. Patient does not have BP/HR readings but states it has been happening all day with change of positions.   Metoprolol is the medication patient is referring to.   Please advise

## 2024-09-06 NOTE — TELEPHONE ENCOUNTER
Hold dose metoprolol today, then decrease to 12.5 mg (1/2 tablet) nightly, bring bp log to appt next week.

## 2024-09-09 RX ORDER — METOPROLOL SUCCINATE 25 MG/1
12.5 TABLET, EXTENDED RELEASE ORAL NIGHTLY
Start: 2024-09-09 | End: 2024-09-13 | Stop reason: SDUPTHER

## 2024-09-09 NOTE — TELEPHONE ENCOUNTER
Attempted at call patient. Left detailed VM with call back number as listed on her verbal release.

## 2024-09-10 ENCOUNTER — HOSPITAL ENCOUNTER (OUTPATIENT)
Dept: CARDIOLOGY | Facility: HOSPITAL | Age: 54
Discharge: HOME | End: 2024-09-10
Payer: COMMERCIAL

## 2024-09-10 DIAGNOSIS — R07.2 PRECORDIAL CHEST PAIN: ICD-10-CM

## 2024-09-10 LAB
BH CV STRESS DURATION MIN STAGE 1: 3
BH CV STRESS DURATION SEC STAGE 1: 0
BH CV STRESS GRADE STAGE 1: 10
BH CV STRESS METS STAGE 1: 5
BH CV STRESS PROTOCOL 1: NORMAL
BH CV STRESS SPEED STAGE 1: 1.7
BH CV STRESS STAGE 1: 1
MAXIMAL PREDICTED HEART RATE: 165 BPM
STRESS TARGET HR: 140 BPM

## 2024-09-11 ENCOUNTER — HOSPITAL ENCOUNTER (OUTPATIENT)
Dept: MRI IMAGING | Facility: HOSPITAL | Age: 54
Discharge: HOME | End: 2024-09-11
Payer: COMMERCIAL

## 2024-09-11 ENCOUNTER — HOSPITAL ENCOUNTER (OUTPATIENT)
Dept: MAMMOGRAPHY | Facility: HOSPITAL | Age: 54
Discharge: HOME | End: 2024-09-11
Payer: COMMERCIAL

## 2024-09-11 ENCOUNTER — TELEPHONE (OUTPATIENT)
Dept: CARDIOLOGY | Facility: CLINIC | Age: 54
End: 2024-09-11
Payer: COMMERCIAL

## 2024-09-11 DIAGNOSIS — R25.1 TREMOR: ICD-10-CM

## 2024-09-11 DIAGNOSIS — R26.89 BALANCE PROBLEM: ICD-10-CM

## 2024-09-11 DIAGNOSIS — Z12.31 SCREENING MAMMOGRAM FOR BREAST CANCER: ICD-10-CM

## 2024-09-11 DIAGNOSIS — H53.2 DIPLOPIA: ICD-10-CM

## 2024-09-11 PROCEDURE — 77067 SCR MAMMO BI INCL CAD: CPT

## 2024-09-11 PROCEDURE — 77063 BREAST TOMOSYNTHESIS BI: CPT

## 2024-09-11 PROCEDURE — 70553 MRI BRAIN STEM W/O & W/DYE: CPT

## 2024-09-11 PROCEDURE — 0 GADOBENATE DIMEGLUMINE 529 MG/ML SOLUTION: Performed by: STUDENT IN AN ORGANIZED HEALTH CARE EDUCATION/TRAINING PROGRAM

## 2024-09-11 PROCEDURE — A9577 INJ MULTIHANCE: HCPCS | Performed by: STUDENT IN AN ORGANIZED HEALTH CARE EDUCATION/TRAINING PROGRAM

## 2024-09-11 RX ADMIN — GADOBENATE DIMEGLUMINE 20 ML: 529 INJECTION, SOLUTION INTRAVENOUS at 09:10

## 2024-09-11 NOTE — TELEPHONE ENCOUNTER
I spoke to Carmelina at CV dept: Patient came in for test and was dizzy, resting HR was 120's. FERNANDO talked to Dr. Cj Crain and he cancelled the test.

## 2024-09-11 NOTE — TELEPHONE ENCOUNTER
Caller: Carmelina Saldana    Relationship: Self    Best call back number: [phone]    What is the best time to reach you: ANY    Who are you requesting to speak with (clinical staff, provider,  specific staff member): ANY    What was the call regarding: PATIENT WAS NOT ABLE TO HAVE THE STRESS TEST DONE.  DOES SHE STILL NEED TO KEEP THE APPT ON 9-13-24?

## 2024-09-11 NOTE — TELEPHONE ENCOUNTER
CAMACHO patient. Went over direction and recommendations. Patient verbalized understanding and appreciation.

## 2024-09-11 NOTE — TELEPHONE ENCOUNTER
Heart rate is remaining elevated have her take an additional dose of metoprolol 25 mg today.   And yes we want to keep the appointment so we can reevaluate her overall heart rate control and discuss further treatments and/or testing

## 2024-09-11 NOTE — TELEPHONE ENCOUNTER
Clarification, dragon dictation issue.  Previous message should say   IF her heart rate is remaining elevated.

## 2024-09-13 ENCOUNTER — OFFICE VISIT (OUTPATIENT)
Dept: CARDIOLOGY | Facility: CLINIC | Age: 54
End: 2024-09-13
Payer: COMMERCIAL

## 2024-09-13 VITALS
BODY MASS INDEX: 37.59 KG/M2 | HEART RATE: 111 BPM | DIASTOLIC BLOOD PRESSURE: 54 MMHG | WEIGHT: 220.2 LBS | SYSTOLIC BLOOD PRESSURE: 96 MMHG | HEIGHT: 64 IN

## 2024-09-13 DIAGNOSIS — R29.818 SUSPECTED SLEEP APNEA: ICD-10-CM

## 2024-09-13 DIAGNOSIS — R07.2 PRECORDIAL CHEST PAIN: ICD-10-CM

## 2024-09-13 DIAGNOSIS — R55 SYNCOPE AND COLLAPSE: ICD-10-CM

## 2024-09-13 DIAGNOSIS — I48.0 PAROXYSMAL A-FIB: Primary | ICD-10-CM

## 2024-09-13 DIAGNOSIS — Z86.711 HISTORY OF PULMONARY EMBOLUS (PE): ICD-10-CM

## 2024-09-13 DIAGNOSIS — I10 ESSENTIAL HYPERTENSION: ICD-10-CM

## 2024-09-13 PROCEDURE — 3078F DIAST BP <80 MM HG: CPT | Performed by: FAMILY MEDICINE

## 2024-09-13 PROCEDURE — 3074F SYST BP LT 130 MM HG: CPT | Performed by: FAMILY MEDICINE

## 2024-09-13 PROCEDURE — 99214 OFFICE O/P EST MOD 30 MIN: CPT | Performed by: FAMILY MEDICINE

## 2024-09-13 RX ORDER — BENAZEPRIL HYDROCHLORIDE 20 MG/1
20 TABLET ORAL DAILY
Qty: 90 TABLET | Refills: 1 | Status: SHIPPED | OUTPATIENT
Start: 2024-09-13

## 2024-09-13 RX ORDER — METOPROLOL SUCCINATE 25 MG/1
25 TABLET, EXTENDED RELEASE ORAL NIGHTLY
Qty: 90 TABLET | Refills: 1
Start: 2024-09-13

## 2024-09-18 ENCOUNTER — PATIENT ROUNDING (BHMG ONLY) (OUTPATIENT)
Dept: URGENT CARE | Facility: CLINIC | Age: 54
End: 2024-09-18
Payer: COMMERCIAL

## 2024-09-20 DIAGNOSIS — I48.0 PAROXYSMAL A-FIB: Primary | ICD-10-CM

## 2024-09-20 DIAGNOSIS — R07.9 CHEST PAIN, UNSPECIFIED TYPE: ICD-10-CM

## 2024-09-21 DIAGNOSIS — R93.0 ABNORMAL MRI OF HEAD: Primary | ICD-10-CM

## 2024-09-21 DIAGNOSIS — R25.1 TREMOR: ICD-10-CM

## 2024-09-21 DIAGNOSIS — H53.2 DIPLOPIA: ICD-10-CM

## 2024-09-21 DIAGNOSIS — R26.89 BALANCE PROBLEM: ICD-10-CM

## 2024-09-25 ENCOUNTER — HOSPITAL ENCOUNTER (OUTPATIENT)
Dept: NUCLEAR MEDICINE | Facility: HOSPITAL | Age: 54
Discharge: HOME | End: 2024-09-25
Payer: COMMERCIAL

## 2024-09-25 DIAGNOSIS — R07.2 PRECORDIAL CHEST PAIN: ICD-10-CM

## 2024-09-25 DIAGNOSIS — I48.0 PAROXYSMAL A-FIB: ICD-10-CM

## 2024-09-25 LAB
BH CV IMMEDIATE POST TECH DATA BLOOD PRESSURE: NORMAL MMHG
BH CV IMMEDIATE POST TECH DATA HEART RATE: 101 BPM
BH CV IMMEDIATE POST TECH DATA OXYGEN SATS: 100 %
BH CV NINE MINUTE RECOVERY TECH DATA BLOOD PRESSURE: NORMAL MMHG
BH CV NINE MINUTE RECOVERY TECH DATA HEART RATE: 90 BPM
BH CV NINE MINUTE RECOVERY TECH DATA OXYGEN SATURATION: 99 %
BH CV REST NUCLEAR ISOTOPE DOSE: 9.4 MCI
BH CV SIX MINUTE RECOVERY TECH DATA BLOOD PRESSURE: NORMAL
BH CV SIX MINUTE RECOVERY TECH DATA HEART RATE: 95 BPM
BH CV SIX MINUTE RECOVERY TECH DATA OXYGEN SATURATION: 98 %
BH CV STRESS BP STAGE 1: NORMAL
BH CV STRESS COMMENTS STAGE 1: NORMAL
BH CV STRESS DOSE REGADENOSON STAGE 1: 0.4
BH CV STRESS DURATION MIN STAGE 1: 0
BH CV STRESS DURATION SEC STAGE 1: 10
BH CV STRESS HR STAGE 1: 94
BH CV STRESS NUCLEAR ISOTOPE DOSE: 38 MCI
BH CV STRESS O2 STAGE 1: 97
BH CV STRESS PROTOCOL 1: NORMAL
BH CV STRESS RECOVERY BP: NORMAL MMHG
BH CV STRESS RECOVERY HR: 90 BPM
BH CV STRESS RECOVERY O2: 99 %
BH CV STRESS STAGE 1: 1
BH CV THREE MINUTE POST TECH DATA BLOOD PRESSURE: NORMAL MMHG
BH CV THREE MINUTE POST TECH DATA HEART RATE: 98 BPM
BH CV THREE MINUTE POST TECH DATA OXYGEN SATURATION: 98 %
BH CV TWELVE MINUTE RECOVERY TECH DATA BLOOD PRESSURE: NORMAL MMHG
BH CV TWELVE MINUTE RECOVERY TECH DATA HEART RATE: 90 BPM
BH CV TWELVE MINUTE RECOVERY TECH DATA OXYGEN SATURATION: 99 %
LV EF NUC BP: 78 %
MAXIMAL PREDICTED HEART RATE: 165 BPM
PERCENT MAX PREDICTED HR: 61.21 %
STRESS BASELINE BP: NORMAL MMHG
STRESS BASELINE HR: 89 BPM
STRESS O2 SAT REST: 96 %
STRESS PERCENT HR: 72 %
STRESS POST O2 SAT PEAK: 100 %
STRESS POST PEAK BP: NORMAL MMHG
STRESS POST PEAK HR: 101 BPM
STRESS TARGET HR: 140 BPM

## 2024-09-25 PROCEDURE — 93016 CV STRESS TEST SUPVJ ONLY: CPT | Performed by: NURSE PRACTITIONER

## 2024-09-25 PROCEDURE — 25010000002 REGADENOSON 0.4 MG/5ML SOLUTION: Performed by: FAMILY MEDICINE

## 2024-09-25 PROCEDURE — 0 TECHNETIUM TETROFOSMIN KIT: Performed by: FAMILY MEDICINE

## 2024-09-25 PROCEDURE — 93017 CV STRESS TEST TRACING ONLY: CPT

## 2024-09-25 PROCEDURE — A9502 TC99M TETROFOSMIN: HCPCS | Performed by: FAMILY MEDICINE

## 2024-09-25 PROCEDURE — 78452 HT MUSCLE IMAGE SPECT MULT: CPT | Performed by: INTERNAL MEDICINE

## 2024-09-25 PROCEDURE — 78452 HT MUSCLE IMAGE SPECT MULT: CPT

## 2024-09-25 PROCEDURE — 93018 CV STRESS TEST I&R ONLY: CPT | Performed by: INTERNAL MEDICINE

## 2024-09-25 RX ORDER — REGADENOSON 0.08 MG/ML
0.4 INJECTION, SOLUTION INTRAVENOUS
Status: COMPLETED | OUTPATIENT
Start: 2024-09-25 | End: 2024-09-25

## 2024-09-25 RX ADMIN — TETROFOSMIN 1 DOSE: 1.38 INJECTION, POWDER, LYOPHILIZED, FOR SOLUTION INTRAVENOUS at 11:08

## 2024-09-25 RX ADMIN — REGADENOSON 0.4 MG: 0.08 INJECTION, SOLUTION INTRAVENOUS at 11:08

## 2024-09-25 RX ADMIN — TETROFOSMIN 1 DOSE: 1.38 INJECTION, POWDER, LYOPHILIZED, FOR SOLUTION INTRAVENOUS at 09:55

## 2024-09-27 ENCOUNTER — HOSPITAL ENCOUNTER (INPATIENT)
Facility: HOSPITAL | Age: 54
LOS: 2 days | Discharge: HOME | DRG: 683 | End: 2024-09-29
Attending: EMERGENCY MEDICINE | Admitting: FAMILY MEDICINE
Payer: COMMERCIAL

## 2024-09-27 ENCOUNTER — APPOINTMENT (OUTPATIENT)
Dept: ULTRASOUND IMAGING | Facility: HOSPITAL | Age: 54
DRG: 683 | End: 2024-09-27
Payer: COMMERCIAL

## 2024-09-27 ENCOUNTER — TELEPHONE (OUTPATIENT)
Dept: CARDIOLOGY | Facility: CLINIC | Age: 54
End: 2024-09-27
Payer: COMMERCIAL

## 2024-09-27 DIAGNOSIS — F32.A MODERATELY SEVERE DEPRESSION: ICD-10-CM

## 2024-09-27 DIAGNOSIS — R55 SYNCOPE, UNSPECIFIED SYNCOPE TYPE: ICD-10-CM

## 2024-09-27 DIAGNOSIS — R26.2 DIFFICULTY IN WALKING: ICD-10-CM

## 2024-09-27 DIAGNOSIS — N17.9 ACUTE RENAL FAILURE, UNSPECIFIED ACUTE RENAL FAILURE TYPE: Primary | ICD-10-CM

## 2024-09-27 PROBLEM — R19.7 DIARRHEA: Status: ACTIVE | Noted: 2024-09-27

## 2024-09-27 PROBLEM — R11.2 INTRACTABLE NAUSEA AND VOMITING: Status: ACTIVE | Noted: 2024-09-27

## 2024-09-27 LAB
027 TOXIN: NORMAL
ALBUMIN SERPL-MCNC: 3.6 G/DL (ref 3.5–5.2)
ALBUMIN/GLOB SERPL: 1.1 G/DL
ALP SERPL-CCNC: 105 U/L (ref 39–117)
ALT SERPL W P-5'-P-CCNC: 26 U/L (ref 1–33)
ANION GAP SERPL CALCULATED.3IONS-SCNC: 17.5 MMOL/L (ref 5–15)
AST SERPL-CCNC: 13 U/L (ref 1–32)
BACTERIA UR QL AUTO: ABNORMAL /HPF
BASOPHILS # BLD AUTO: 0.07 10*3/MM3 (ref 0–0.2)
BASOPHILS NFR BLD AUTO: 0.6 % (ref 0–1.5)
BILIRUB SERPL-MCNC: 0.7 MG/DL (ref 0–1.2)
BILIRUB UR QL STRIP: NEGATIVE
BUN SERPL-MCNC: 68 MG/DL (ref 6–20)
BUN/CREAT SERPL: 14.1 (ref 7–25)
C DIFF TOX GENS STL QL NAA+PROBE: NEGATIVE
CALCIUM SPEC-SCNC: 9.4 MG/DL (ref 8.6–10.5)
CHLORIDE SERPL-SCNC: 94 MMOL/L (ref 98–107)
CLARITY UR: ABNORMAL
CO2 SERPL-SCNC: 21.5 MMOL/L (ref 22–29)
COLOR UR: YELLOW
CREAT SERPL-MCNC: 4.81 MG/DL (ref 0.57–1)
DEPRECATED RDW RBC AUTO: 44.8 FL (ref 37–54)
EGFRCR SERPLBLD CKD-EPI 2021: 10.1 ML/MIN/1.73
EOSINOPHIL # BLD AUTO: 0.11 10*3/MM3 (ref 0–0.4)
EOSINOPHIL NFR BLD AUTO: 0.9 % (ref 0.3–6.2)
ERYTHROCYTE [DISTWIDTH] IN BLOOD BY AUTOMATED COUNT: 13.5 % (ref 12.3–15.4)
GLOBULIN UR ELPH-MCNC: 3.4 GM/DL
GLUCOSE BLDC GLUCOMTR-MCNC: 126 MG/DL (ref 70–99)
GLUCOSE BLDC GLUCOMTR-MCNC: 156 MG/DL (ref 70–99)
GLUCOSE BLDC GLUCOMTR-MCNC: 211 MG/DL (ref 70–99)
GLUCOSE BLDC GLUCOMTR-MCNC: 227 MG/DL (ref 70–99)
GLUCOSE BLDC GLUCOMTR-MCNC: 89 MG/DL (ref 70–99)
GLUCOSE SERPL-MCNC: 349 MG/DL (ref 65–99)
GLUCOSE UR STRIP-MCNC: NEGATIVE MG/DL
HBA1C MFR BLD: 8.8 % (ref 4.8–5.6)
HCT VFR BLD AUTO: 42.6 % (ref 34–46.6)
HGB BLD-MCNC: 14.5 G/DL (ref 12–15.9)
HGB UR QL STRIP.AUTO: ABNORMAL
HOLD SPECIMEN: NORMAL
HOLD SPECIMEN: NORMAL
HYALINE CASTS UR QL AUTO: ABNORMAL /LPF
IMM GRANULOCYTES # BLD AUTO: 0.18 10*3/MM3 (ref 0–0.05)
IMM GRANULOCYTES NFR BLD AUTO: 1.5 % (ref 0–0.5)
KETONES UR QL STRIP: NEGATIVE
LEUKOCYTE ESTERASE UR QL STRIP.AUTO: ABNORMAL
LYMPHOCYTES # BLD AUTO: 1.92 10*3/MM3 (ref 0.7–3.1)
LYMPHOCYTES NFR BLD AUTO: 16.2 % (ref 19.6–45.3)
MAGNESIUM SERPL-MCNC: 2.2 MG/DL (ref 1.6–2.6)
MCH RBC QN AUTO: 30.7 PG (ref 26.6–33)
MCHC RBC AUTO-ENTMCNC: 34 G/DL (ref 31.5–35.7)
MCV RBC AUTO: 90.1 FL (ref 79–97)
MONOCYTES # BLD AUTO: 0.83 10*3/MM3 (ref 0.1–0.9)
MONOCYTES NFR BLD AUTO: 7 % (ref 5–12)
NEUTROPHILS NFR BLD AUTO: 73.8 % (ref 42.7–76)
NEUTROPHILS NFR BLD AUTO: 8.73 10*3/MM3 (ref 1.7–7)
NITRITE UR QL STRIP: NEGATIVE
NRBC BLD AUTO-RTO: 0 /100 WBC (ref 0–0.2)
PH UR STRIP.AUTO: <=5 [PH] (ref 5–8)
PLATELET # BLD AUTO: 298 10*3/MM3 (ref 140–450)
PMV BLD AUTO: 10 FL (ref 6–12)
POTASSIUM SERPL-SCNC: 4.9 MMOL/L (ref 3.5–5.2)
PROT SERPL-MCNC: 7 G/DL (ref 6–8.5)
PROT UR QL STRIP: ABNORMAL
QT INTERVAL: 391 MS
QTC INTERVAL: 478 MS
RBC # BLD AUTO: 4.73 10*6/MM3 (ref 3.77–5.28)
RBC # UR STRIP: ABNORMAL /HPF
REF LAB TEST METHOD: ABNORMAL
SODIUM SERPL-SCNC: 133 MMOL/L (ref 136–145)
SP GR UR STRIP: 1.01 (ref 1–1.03)
SQUAMOUS #/AREA URNS HPF: ABNORMAL /HPF
STARCH GRANULES URNS QL MICRO: ABNORMAL /HPF
T4 FREE SERPL-MCNC: 3.28 NG/DL (ref 0.92–1.68)
TROPONIN T SERPL HS-MCNC: 19 NG/L
TSH SERPL DL<=0.05 MIU/L-ACNC: 0.07 UIU/ML (ref 0.27–4.2)
UROBILINOGEN UR QL STRIP: ABNORMAL
WBC # UR STRIP: ABNORMAL /HPF
WBC NRBC COR # BLD AUTO: 11.84 10*3/MM3 (ref 3.4–10.8)
WHOLE BLOOD HOLD COAG: NORMAL
WHOLE BLOOD HOLD SPECIMEN: NORMAL

## 2024-09-27 PROCEDURE — 93010 ELECTROCARDIOGRAM REPORT: CPT | Performed by: INTERNAL MEDICINE

## 2024-09-27 PROCEDURE — 81001 URINALYSIS AUTO W/SCOPE: CPT | Performed by: EMERGENCY MEDICINE

## 2024-09-27 PROCEDURE — 80050 GENERAL HEALTH PANEL: CPT | Performed by: EMERGENCY MEDICINE

## 2024-09-27 PROCEDURE — 63710000001 INSULIN LISPRO (HUMAN) PER 5 UNITS: Performed by: FAMILY MEDICINE

## 2024-09-27 PROCEDURE — 84439 ASSAY OF FREE THYROXINE: CPT | Performed by: FAMILY MEDICINE

## 2024-09-27 PROCEDURE — 76775 US EXAM ABDO BACK WALL LIM: CPT

## 2024-09-27 PROCEDURE — 87086 URINE CULTURE/COLONY COUNT: CPT | Performed by: EMERGENCY MEDICINE

## 2024-09-27 PROCEDURE — 25810000003 SODIUM CHLORIDE 0.9 % SOLUTION: Performed by: EMERGENCY MEDICINE

## 2024-09-27 PROCEDURE — 25810000003 LACTATED RINGERS PER 1000 ML: Performed by: FAMILY MEDICINE

## 2024-09-27 PROCEDURE — 36415 COLL VENOUS BLD VENIPUNCTURE: CPT

## 2024-09-27 PROCEDURE — 84484 ASSAY OF TROPONIN QUANT: CPT | Performed by: EMERGENCY MEDICINE

## 2024-09-27 PROCEDURE — 83036 HEMOGLOBIN GLYCOSYLATED A1C: CPT | Performed by: FAMILY MEDICINE

## 2024-09-27 PROCEDURE — 99223 1ST HOSP IP/OBS HIGH 75: CPT | Performed by: FAMILY MEDICINE

## 2024-09-27 PROCEDURE — 25010000002 ONDANSETRON PER 1 MG: Performed by: EMERGENCY MEDICINE

## 2024-09-27 PROCEDURE — 83735 ASSAY OF MAGNESIUM: CPT | Performed by: EMERGENCY MEDICINE

## 2024-09-27 PROCEDURE — 87186 SC STD MICRODIL/AGAR DIL: CPT | Performed by: EMERGENCY MEDICINE

## 2024-09-27 PROCEDURE — 87088 URINE BACTERIA CULTURE: CPT | Performed by: EMERGENCY MEDICINE

## 2024-09-27 PROCEDURE — 25810000003 LACTATED RINGERS SOLUTION: Performed by: FAMILY MEDICINE

## 2024-09-27 PROCEDURE — 82948 REAGENT STRIP/BLOOD GLUCOSE: CPT | Performed by: FAMILY MEDICINE

## 2024-09-27 PROCEDURE — 97161 PT EVAL LOW COMPLEX 20 MIN: CPT | Performed by: PHYSICAL THERAPIST

## 2024-09-27 PROCEDURE — 99285 EMERGENCY DEPT VISIT HI MDM: CPT

## 2024-09-27 PROCEDURE — 25810000003 SODIUM CHLORIDE 0.9 % SOLUTION: Performed by: FAMILY MEDICINE

## 2024-09-27 PROCEDURE — 63710000001 INSULIN GLARGINE PER 5 UNITS: Performed by: FAMILY MEDICINE

## 2024-09-27 PROCEDURE — 87506 IADNA-DNA/RNA PROBE TQ 6-11: CPT | Performed by: FAMILY MEDICINE

## 2024-09-27 PROCEDURE — 25010000002 METOCLOPRAMIDE PER 10 MG: Performed by: FAMILY MEDICINE

## 2024-09-27 PROCEDURE — 82948 REAGENT STRIP/BLOOD GLUCOSE: CPT

## 2024-09-27 PROCEDURE — 93005 ELECTROCARDIOGRAM TRACING: CPT | Performed by: EMERGENCY MEDICINE

## 2024-09-27 PROCEDURE — 87493 C DIFF AMPLIFIED PROBE: CPT | Performed by: FAMILY MEDICINE

## 2024-09-27 RX ORDER — BISACODYL 5 MG/1
5 TABLET, DELAYED RELEASE ORAL DAILY PRN
Status: DISCONTINUED | OUTPATIENT
Start: 2024-09-27 | End: 2024-09-29 | Stop reason: HOSPADM

## 2024-09-27 RX ORDER — ONDANSETRON 2 MG/ML
4 INJECTION INTRAMUSCULAR; INTRAVENOUS ONCE
Status: COMPLETED | OUTPATIENT
Start: 2024-09-27 | End: 2024-09-27

## 2024-09-27 RX ORDER — GABAPENTIN 300 MG/1
300 CAPSULE ORAL 3 TIMES DAILY
Status: DISCONTINUED | OUTPATIENT
Start: 2024-09-27 | End: 2024-09-29 | Stop reason: HOSPADM

## 2024-09-27 RX ORDER — SODIUM CHLORIDE, SODIUM LACTATE, POTASSIUM CHLORIDE, CALCIUM CHLORIDE 600; 310; 30; 20 MG/100ML; MG/100ML; MG/100ML; MG/100ML
100 INJECTION, SOLUTION INTRAVENOUS CONTINUOUS
Status: DISCONTINUED | OUTPATIENT
Start: 2024-09-27 | End: 2024-09-29 | Stop reason: HOSPADM

## 2024-09-27 RX ORDER — SODIUM CHLORIDE 0.9 % (FLUSH) 0.9 %
10 SYRINGE (ML) INJECTION AS NEEDED
Status: DISCONTINUED | OUTPATIENT
Start: 2024-09-27 | End: 2024-09-29 | Stop reason: HOSPADM

## 2024-09-27 RX ORDER — INSULIN LISPRO 100 [IU]/ML
3-14 INJECTION, SOLUTION INTRAVENOUS; SUBCUTANEOUS
Status: DISCONTINUED | OUTPATIENT
Start: 2024-09-27 | End: 2024-09-29 | Stop reason: HOSPADM

## 2024-09-27 RX ORDER — ONDANSETRON 2 MG/ML
4 INJECTION INTRAMUSCULAR; INTRAVENOUS EVERY 6 HOURS PRN
Status: DISCONTINUED | OUTPATIENT
Start: 2024-09-27 | End: 2024-09-29 | Stop reason: HOSPADM

## 2024-09-27 RX ORDER — BISACODYL 10 MG
10 SUPPOSITORY, RECTAL RECTAL DAILY PRN
Status: DISCONTINUED | OUTPATIENT
Start: 2024-09-27 | End: 2024-09-29 | Stop reason: HOSPADM

## 2024-09-27 RX ORDER — HEPARIN SODIUM 5000 [USP'U]/ML
5000 INJECTION, SOLUTION INTRAVENOUS; SUBCUTANEOUS EVERY 12 HOURS SCHEDULED
Status: DISCONTINUED | OUTPATIENT
Start: 2024-09-27 | End: 2024-09-27

## 2024-09-27 RX ORDER — METOCLOPRAMIDE HYDROCHLORIDE 5 MG/ML
10 INJECTION INTRAMUSCULAR; INTRAVENOUS EVERY 8 HOURS
Status: COMPLETED | OUTPATIENT
Start: 2024-09-27 | End: 2024-09-27

## 2024-09-27 RX ORDER — DEXTROSE MONOHYDRATE 25 G/50ML
25 INJECTION, SOLUTION INTRAVENOUS
Status: DISCONTINUED | OUTPATIENT
Start: 2024-09-27 | End: 2024-09-29 | Stop reason: HOSPADM

## 2024-09-27 RX ORDER — IBUPROFEN 600 MG/1
1 TABLET ORAL
Status: DISCONTINUED | OUTPATIENT
Start: 2024-09-27 | End: 2024-09-29 | Stop reason: HOSPADM

## 2024-09-27 RX ORDER — SODIUM CHLORIDE 9 MG/ML
125 INJECTION, SOLUTION INTRAVENOUS CONTINUOUS
Status: DISCONTINUED | OUTPATIENT
Start: 2024-09-27 | End: 2024-09-27

## 2024-09-27 RX ORDER — AMOXICILLIN 250 MG
2 CAPSULE ORAL 2 TIMES DAILY PRN
Status: DISCONTINUED | OUTPATIENT
Start: 2024-09-27 | End: 2024-09-29 | Stop reason: HOSPADM

## 2024-09-27 RX ORDER — PANTOPRAZOLE SODIUM 40 MG/10ML
40 INJECTION, POWDER, LYOPHILIZED, FOR SOLUTION INTRAVENOUS
Status: DISCONTINUED | OUTPATIENT
Start: 2024-09-27 | End: 2024-09-29 | Stop reason: HOSPADM

## 2024-09-27 RX ORDER — SODIUM CHLORIDE 9 MG/ML
40 INJECTION, SOLUTION INTRAVENOUS AS NEEDED
Status: DISCONTINUED | OUTPATIENT
Start: 2024-09-27 | End: 2024-09-29 | Stop reason: HOSPADM

## 2024-09-27 RX ORDER — SODIUM CHLORIDE 0.9 % (FLUSH) 0.9 %
10 SYRINGE (ML) INJECTION EVERY 12 HOURS SCHEDULED
Status: DISCONTINUED | OUTPATIENT
Start: 2024-09-27 | End: 2024-09-29 | Stop reason: HOSPADM

## 2024-09-27 RX ORDER — NICOTINE POLACRILEX 4 MG
15 LOZENGE BUCCAL
Status: DISCONTINUED | OUTPATIENT
Start: 2024-09-27 | End: 2024-09-29 | Stop reason: HOSPADM

## 2024-09-27 RX ORDER — POLYETHYLENE GLYCOL 3350 17 G/17G
17 POWDER, FOR SOLUTION ORAL DAILY PRN
Status: DISCONTINUED | OUTPATIENT
Start: 2024-09-27 | End: 2024-09-29 | Stop reason: HOSPADM

## 2024-09-27 RX ADMIN — PANTOPRAZOLE SODIUM 40 MG: 40 INJECTION, POWDER, FOR SOLUTION INTRAVENOUS at 05:36

## 2024-09-27 RX ADMIN — APIXABAN 5 MG: 5 TABLET, FILM COATED ORAL at 09:46

## 2024-09-27 RX ADMIN — Medication 10 ML: at 09:46

## 2024-09-27 RX ADMIN — SODIUM CHLORIDE, POTASSIUM CHLORIDE, SODIUM LACTATE AND CALCIUM CHLORIDE 1000 ML: 600; 310; 30; 20 INJECTION, SOLUTION INTRAVENOUS at 09:53

## 2024-09-27 RX ADMIN — METOCLOPRAMIDE HYDROCHLORIDE 10 MG: 5 INJECTION INTRAMUSCULAR; INTRAVENOUS at 05:36

## 2024-09-27 RX ADMIN — SODIUM CHLORIDE, POTASSIUM CHLORIDE, SODIUM LACTATE AND CALCIUM CHLORIDE 100 ML/HR: 600; 310; 30; 20 INJECTION, SOLUTION INTRAVENOUS at 09:53

## 2024-09-27 RX ADMIN — INSULIN LISPRO 3 UNITS: 100 INJECTION, SOLUTION INTRAVENOUS; SUBCUTANEOUS at 13:02

## 2024-09-27 RX ADMIN — SODIUM CHLORIDE, POTASSIUM CHLORIDE, SODIUM LACTATE AND CALCIUM CHLORIDE 500 ML: 600; 310; 30; 20 INJECTION, SOLUTION INTRAVENOUS at 11:27

## 2024-09-27 RX ADMIN — INSULIN GLARGINE 25 UNITS: 100 INJECTION, SOLUTION SUBCUTANEOUS at 06:05

## 2024-09-27 RX ADMIN — GABAPENTIN 300 MG: 300 CAPSULE ORAL at 20:28

## 2024-09-27 RX ADMIN — SODIUM CHLORIDE 2000 ML: 9 INJECTION, SOLUTION INTRAVENOUS at 04:09

## 2024-09-27 RX ADMIN — METOCLOPRAMIDE HYDROCHLORIDE 10 MG: 5 INJECTION INTRAMUSCULAR; INTRAVENOUS at 13:05

## 2024-09-27 RX ADMIN — INSULIN LISPRO 5 UNITS: 100 INJECTION, SOLUTION INTRAVENOUS; SUBCUTANEOUS at 09:46

## 2024-09-27 RX ADMIN — SODIUM CHLORIDE 125 ML/HR: 9 INJECTION, SOLUTION INTRAVENOUS at 06:04

## 2024-09-27 RX ADMIN — SODIUM CHLORIDE, POTASSIUM CHLORIDE, SODIUM LACTATE AND CALCIUM CHLORIDE 100 ML/HR: 600; 310; 30; 20 INJECTION, SOLUTION INTRAVENOUS at 16:53

## 2024-09-27 RX ADMIN — GABAPENTIN 300 MG: 300 CAPSULE ORAL at 09:46

## 2024-09-27 RX ADMIN — ONDANSETRON 4 MG: 2 INJECTION INTRAMUSCULAR; INTRAVENOUS at 04:09

## 2024-09-27 RX ADMIN — APIXABAN 5 MG: 5 TABLET, FILM COATED ORAL at 20:28

## 2024-09-27 NOTE — CONSULTS
"  [facility]   Consult Note    Patient Name: Carmelina Saldana  : 1970  MRN: [id number]  Primary Care Physician:  Guy Beatty MD  Referring Physician: No Known Provider  Date of admission: 2024    Subjective   Subjective     Reason for Consult/ Chief Complaint: SUSANNA    HPI:  Carmelina Saldana is a 54 y.o. female 54-year-old female with past medical history of CKD stage III with baseline creatinine 1.1-1.3, hypertension, diabetes, atrial fibrillation on anticoagulation, hypothyroidism, morbid obesity, anxiety/depression, GERD who came in with syncopal episodes.  Patient has been having nausea and vomiting for the last 1 week and unable to keep anything down.  She also has been having occasional diarrhea.  She has noted to have decreased urine output as well.  Overnight she had gotten up to use the restroom however she felt dizzy and passed out.  EMS was subsequently called and patient presented to the ER    Patient's blood pressures were noted to be low at the time of admission.  CBC with mild elevation in white count to 12.  Creatinine was noted to be significantly elevated at 4.8.  Urinalysis showed many hyaline casts.  Patient admitted for further management of symptoms.  Renal ultrasound was unremarkable.  Nephrology has been consulted for management of SUSANNA    Review of Systems  All review of systems negative except as given below.    Personal History     Past Medical History:   Diagnosis Date    Abnormal ECG Aug    Acute deep vein thrombosis (DVT) of right popliteal vein     Anemia     Anxiety     Arthritis     Atrial fibrillation     Bilateral pulmonary embolism     Hypertension     Legally blind     Neuropathy     some pains at night in the feet    Obesity     Pleuritic chest pain     Retinopathy     no retinopathy but does have \"blood behind the eyes\"; going for injections and laser treatment; she states her vision has improved some and she will be undergoing laser treatment    " Thyroid disease     removed due to thyroid nodules     Type 2 diabetes mellitus        Past Surgical History:   Procedure Laterality Date    CATARACT EXTRACTION, BILATERAL Bilateral     08/16/2024 & 08/09/2024    CHOLECYSTECTOMY      EYE SURGERY Left 09/28/2022    HYSTERECTOMY      PARTIAL    THYROID SURGERY      US GUIDED FINE NEEDLE ASPIRATION  03/02/2020       Family History: family history includes Asthma in her sister; Diabetes type II in her mother and another family member; Stroke in her father. Otherwise pertinent FHx was reviewed and not pertinent to current issue.    Social History:  reports that she has never smoked. She has never been exposed to tobacco smoke. She has never used smokeless tobacco. She reports that she does not currently use alcohol. She reports that she does not use drugs.    Home Medications:  Blood Glucose Monitoring Suppl, DULoxetine, HYDROcodone-acetaminophen, Insulin Aspart FlexPen, Insulin Glargine, Semaglutide (1 MG/DOSE), albuterol, albuterol sulfate HFA, amoxicillin-clavulanate, apixaban, atorvastatin, benazepril, busPIRone, chlorthalidone, famotidine, gabapentin, glucose blood, hydrOXYzine, levothyroxine, liothyronine, methylPREDNISolone, metoprolol succinate XL, onetouch ultrasoft, pantoprazole, promethazine-dextromethorphan, rOPINIRole, sertraline, and sucralfate    Allergies:  No Known Allergies    Objective    Objective     Vitals:   Temp:  [97.5 °F (36.4 °C)] 97.5 °F (36.4 °C)  Heart Rate:  [81-96] 96  Resp:  [18] 18  BP: ()/(47-72) 101/59    Physical Exam:             Constitutional:         Awake, alert responsive, conversant, no obvious distress   Eyes:                       PERRLA, sclerae anicteric, no conjunctival injection   HEENT:                   Moist mucous membranes, no nasal or eye discharge, no throat congestion   Neck:                      Supple, no thyromegaly, no lymphadenopathy, trachea midline, no elevated JVD   Respiratory:           Clear to  auscultation bilaterally, nonlabored respirations    Cardiovascular:     RRR, no murmurs, rubs, or gallops, palpable pedal pulses bilaterally, No bilateral ankle edema   Gastrointestinal:   Positive bowel sounds, soft, nontender, non-distended, no organomegaly   Musculoskeletal:  No clubbing or cyanosis to extremities, muscle wasting, joint swelling, muscle weakness   Psychiatric:              Appropriate affect, cooperative   Neurologic:            Awake alert, oriented x 3, strength symmetric in all extremities, Cranial Nerves grossly intact to confrontation, speech clear   Skin:                      No rashes, bruising, skin ulcers, petechiae or ecchymosis    Result Review    Result Review:  I have personally reviewed the results from the time of this admission to 9/27/2024 08:35 EDT and agree with these findings:  []  Laboratory  []  Microbiology  []  Radiology  []  EKG/Telemetry   []  Cardiology/Vascular   []  Pathology  []  Old records  []  Other:    Results from last 7 days   Lab Units 09/27/24  0242   WBC 10*3/mm3 11.84*   HEMOGLOBIN g/dL 14.5   PLATELETS 10*3/mm3 298     Results from last 7 days   Lab Units 09/27/24  0242   SODIUM mmol/L 133*   POTASSIUM mmol/L 4.9   CHLORIDE mmol/L 94*   CO2 mmol/L 21.5*   ANION GAP mmol/L 17.5*   BUN mg/dL 68*   CREATININE mg/dL 4.81*   GLUCOSE mg/dL 349*       Assessment & Plan   Assessment / Plan     Active Hospital Problems:  Active Hospital Problems    Diagnosis     **Acute renal failure     Syncope     Intractable nausea and vomiting     Diarrhea     Paroxysmal A-fib     Type 2 diabetes mellitus with diabetic neuropathy, with long-term current use of insulin     History of pulmonary embolus (PE)     Essential hypertension     Class 3 severe obesity without serious comorbidity with body mass index (BMI) of 40.0 to 44.9 in adult      4-year-old female with past medical history of CKD stage III with baseline creatinine 1.1-1.3, hypertension, diabetes, atrial  fibrillation on anticoagulation, history of PE, hypothyroidism, morbid obesity, anxiety/depression, GERD who came in with syncopal episode in background of dizziness lightheadedness with diarrhea and poor p.o. intake and decreased urine output associate with an SUSANNA and creatinine rise to 4.8 while being on chlorthalidone and benazepril.  Urine analysis with many hyaline casts.    Plan:   Continue to hold antihypertensives  Agree with normal saline and will continue till order expires  Patient be monitored for syncope though it appears most likely due to hypotension and being volume down  Further supportive care for nausea vomiting and diarrhea    Thank you for involving me in the care of the patient.  Will continue to follow    Electronically signed by Deanna Lange MD, 09/27/24, 8:35 AM EDT.

## 2024-09-27 NOTE — TELEPHONE ENCOUNTER
Stress test does show a perfusion change to a small area of the heart which could be indicated with decreased blood flow.  Recommend scheduling her for close in office follow-up to decide on medication adjustment versus further testing.

## 2024-09-27 NOTE — H&P
[facility]   HISTORY AND PHYSICAL    Patient Name: Carmelina Saldana  : 1970  MRN: [id number]  Primary Care Physician:  Guy Beatty MD  Date of admission: 2024    Subjective   Subjective     Chief Complaint: Syncopal episode    HPI:    Carmelina Saldana is a 54 y.o. female with past medical history of diabetes, hypertension, A-fib on Eliquis, anxiety, hypothyroidism, obesity, anxiety, and GERD presented to the ED for evaluation of a syncopal episode.  Patient states that for the last week or so she has been having intractable nausea and vomiting to where she cannot keep anything down, along with nonbloody diarrhea and decreased p.o. intake.  She has also had minimal urine output.  Patient is on insulin at home and has been on Ozempic for over a year.  Around midnight patient got up to use the bathroom and became significantly lightheaded and dizzy and then passed out.  Patient denied sustaining any significant trauma from passing out.  EMS was called by family and she was brought to the ED for further evaluation.  In the ED patient was hypotensive on arrival which responded well to fluids.  Labs showed that she was in acute renal failure with hyperglycemia and mild leukocytosis.  When seen patient was feeling much better after fluids.  She denied any recent fevers, headaches, focal weakness, chest pain, palpitation, shortness of breath, cough, diarrhea, constipation, dysuria, hematuria, hematochezia, melena, or anxiety.  Patient admitted for further evaluation and treatment.    Review of Systems   All systems were reviewed and negative except for: As per HPI    Personal History     Past Medical History:   Diagnosis Date    Abnormal ECG Aug    Acute deep vein thrombosis (DVT) of right popliteal vein     Anemia     Anxiety     Arthritis     Atrial fibrillation     Bilateral pulmonary embolism     Hypertension     Legally blind     Neuropathy     some pains at night in the feet    Obesity   "   Pleuritic chest pain     Retinopathy     no retinopathy but does have \"blood behind the eyes\"; going for injections and laser treatment; she states her vision has improved some and she will be undergoing laser treatment    Thyroid disease     removed due to thyroid nodules     Type 2 diabetes mellitus        Past Surgical History:   Procedure Laterality Date    CATARACT EXTRACTION, BILATERAL Bilateral     08/16/2024 & 08/09/2024    CHOLECYSTECTOMY      EYE SURGERY Left 09/28/2022    HYSTERECTOMY      PARTIAL    THYROID SURGERY      US GUIDED FINE NEEDLE ASPIRATION  03/02/2020       Family History: family history includes Asthma in her sister; Diabetes type II in her mother and another family member; Stroke in her father. Otherwise pertinent FHx was reviewed and not pertinent to current issue.    Social History:  reports that she has never smoked. She has never been exposed to tobacco smoke. She has never used smokeless tobacco. She reports that she does not currently use alcohol. She reports that she does not use drugs.    Home Medications:  Blood Glucose Monitoring Suppl, DULoxetine, HYDROcodone-acetaminophen, Insulin Aspart FlexPen, Insulin Glargine, Semaglutide (1 MG/DOSE), albuterol, albuterol sulfate HFA, amoxicillin-clavulanate, apixaban, atorvastatin, benazepril, busPIRone, chlorthalidone, famotidine, gabapentin, glucose blood, hydrOXYzine, levothyroxine, liothyronine, methylPREDNISolone, metoprolol succinate XL, onetouch ultrasoft, pantoprazole, promethazine-dextromethorphan, rOPINIRole, sertraline, and sucralfate      Allergies:  No Known Allergies    Objective   Objective     Vitals:   Temp:  [97.5 °F (36.4 °C)] 97.5 °F (36.4 °C)  Heart Rate:  [81-96] 96  Resp:  [18] 18  BP: ()/(47-72) 101/59  Physical Exam    Constitutional: Awake, alert   Eyes: PERRLA, sclerae anicteric, no conjunctival injection   HENT: NCAT, mucous membranes moist   Neck: Supple, no thyromegaly, no lymphadenopathy, trachea " midline   Respiratory: Clear to auscultation bilaterally, nonlabored respirations    Cardiovascular: RRR, no murmurs, rubs, or gallops, palpable pedal pulses bilaterally   Gastrointestinal: Abdominal tenderness, positive bowel sounds, soft, nondistended   Musculoskeletal: No bilateral ankle edema, no clubbing or cyanosis to extremities   Psychiatric: Appropriate affect, cooperative   Neurologic: Oriented x 3, strength symmetric in all extremities, Cranial Nerves grossly intact to confrontation, speech clear   Skin: No rashes     Result Review    Result Review:  I have personally reviewed the results from the time of this admission to 9/27/2024 05:45 EDT and agree with these findings:  [x]  Laboratory list / accordion  []  Microbiology  [x]  Radiology  [x]  EKG/Telemetry   []  Cardiology/Vascular   []  Pathology  []  Old records  []  Other:  Most notable findings include: Acute renal failure, leukocytosis, hyperglycemia, electrolyte imbalances       Assessment & Plan   Assessment / Plan     Brief Patient Summary:  Carmelina Saldana is a 54 y.o. female with past medical history of diabetes, hypertension, A-fib on Eliquis, anxiety, hypothyroidism, obesity, anxiety, and GERD presented to the ED for evaluation of a syncopal episode.    Active Hospital Problems:  Active Hospital Problems    Diagnosis     **Acute renal failure     Syncope     Intractable nausea and vomiting     Diarrhea     Paroxysmal A-fib     Type 2 diabetes mellitus with diabetic neuropathy, with long-term current use of insulin     History of pulmonary embolus (PE)     Essential hypertension     Class 3 severe obesity without serious comorbidity with body mass index (BMI) of 40.0 to 44.9 in adult      Plan:     Acute renal failure  -Admit to telemetry  -Patient with normal function at baseline  -Dehydration secondary to GI losses with superimposed poor p.o. intake  -IVF  -UA ordered and pending  -Avoid nephrotoxic drugs  -Consult nephrology  -Will  follow labs  -Supportive care    Syncope  -Likely secondary to above  -Patient on BP meds including beta-blockers  -Fall precautions  -EKG reviewed  -Initial troponin negative, will trend  -TSH  -Orthostatic Vitals  -IVF  -Further workup and consult if needed    Intractable nausea and vomiting  -Possible gastroparesis  -IVF  -Reglan 3 times daily  -Zofran as needed  -Further workup including gastric emptying study if warranted  -Consult GI if warranted    Diarrhea  -Patient recently on antibiotics  -Differential ordered and pending  -IVF    Diabetes  -Insulin sliding scale  -Levemir at bedtime  -Titrate as needed    A-fib  -Resume Eliquis    Hypothyroidism    GI ppx        VTE Prophylaxis:  Pharmacologic VTE prophylaxis orders are present.        CODE STATUS:    Level Of Support Discussed With: Patient  Code Status (Patient has no pulse and is not breathing): CPR (Attempt to Resuscitate)  Medical Interventions (Patient has pulse or is breathing): Full Support    Admission Status:  I believe this patient meets inpatient status.      Electronically signed by Juan F Mo MD, 09/27/24, 5:45 AM EDT.

## 2024-09-27 NOTE — THERAPY EVALUATION
"Acute Care - Physical Therapy Initial Evaluation  KENNETH Mcnair     Patient Name: Carmelina Saldana  : 1970  MRN: [id number]  Today's Date: 2024      Visit Dx:     ICD-10-CM ICD-9-CM   1. Acute renal failure, unspecified acute renal failure type  N17.9 584.9   2. Syncope, unspecified syncope type  R55 780.2   3. Difficulty in walking  R26.2 719.7     Patient Active Problem List   Diagnosis    Uncontrolled type 2 diabetes mellitus    Type 2 diabetes mellitus with diabetic neuropathy, with long-term current use of insulin    Essential hypertension    Postoperative hypothyroidism    Other hyperlipidemia    Class 3 severe obesity without serious comorbidity with body mass index (BMI) of 40.0 to 44.9 in adult    History of pulmonary embolus (PE)    Chronic anticoagulation    Vitreous hemorrhage of right eye    Chronic cough    Visit for screening mammogram    Colon cancer screening    Severe obesity due to excess calories    Vitreous hemorrhage, left eye    Legally blind    Paroxysmal A-fib    Acute renal failure    Syncope    Intractable nausea and vomiting    Diarrhea     Past Medical History:   Diagnosis Date    Abnormal ECG Aug    Acute deep vein thrombosis (DVT) of right popliteal vein     Anemia     Anxiety     Arthritis     Atrial fibrillation     Bilateral pulmonary embolism     Hypertension     Legally blind     Neuropathy     some pains at night in the feet    Obesity     Pleuritic chest pain     Retinopathy     no retinopathy but does have \"blood behind the eyes\"; going for injections and laser treatment; she states her vision has improved some and she will be undergoing laser treatment    Thyroid disease     removed due to thyroid nodules     Type 2 diabetes mellitus      Past Surgical History:   Procedure Laterality Date    CATARACT EXTRACTION, BILATERAL Bilateral     2024 & 2024    CHOLECYSTECTOMY      EYE SURGERY Left 2022    HYSTERECTOMY      PARTIAL    THYROID SURGERY      US " GUIDED FINE NEEDLE ASPIRATION  03/02/2020     PT Assessment (Last 12 Hours)       PT Evaluation and Treatment       Row Name 09/27/24 1400          Physical Therapy Time and Intention    Subjective Information weakness;complains of  -TC     Document Type evaluation  -TC     Mode of Treatment individual therapy;physical therapy  -TC     Patient Effort excellent  -TC     Symptoms Noted During/After Treatment none  -TC       Row Name 09/27/24 1400          General Information    Patient Profile Reviewed yes  -TC     Patient Observations alert;cooperative;agree to therapy  -TC     Prior Level of Function independent:;all household mobility  -TC     Equipment Currently Used at Home none  -TC     Existing Precautions/Restrictions no known precautions/restrictions  -TC       Row Name 09/27/24 1400          Living Environment    Current Living Arrangements home  -TC     Home Accessibility stairs to enter home  -TC     People in Home child(randy), adult  -TC     Primary Care Provided by self  -TC       Row Name 09/27/24 1400          Home Main Entrance    Number of Stairs, Main Entrance five  -TC     Stair Railings, Main Entrance railings on both sides of stairs  -TC       Row Name 09/27/24 1400          Home Use of Assistive/Adaptive Equipment    Equipment Currently Used at Home none  -TC       Row Name 09/27/24 1400          Pain    Pretreatment Pain Rating 0/10 - no pain  -TC     Posttreatment Pain Rating 0/10 - no pain  -TC       Row Name 09/27/24 1400          Cognition    Affect/Mental Status (Cognition) WNL  -TC       Row Name 09/27/24 1400          Range of Motion (ROM)    Range of Motion bilateral lower extremities  -TC       Row Name 09/27/24 1400          Range of Motion Comprehensive    General Range of Motion no range of motion deficits identified  -TC       Row Name 09/27/24 1400          Strength (Manual Muscle Testing)    Strength (Manual Muscle Testing) strength is WFL  -TC       Row Name 09/27/24 1400           Strength Comprehensive (MMT)    General Manual Muscle Testing (MMT) Assessment no strength deficits identified  B LE MMT 5/5  -TC       Row Name 09/27/24 1400          Bed Mobility    Bed Mobility supine-sit;sit-supine  -TC     Supine-Sit [place] (Bed Mobility) independent  -TC     Sit-Supine [place] (Bed Mobility) independent  -TC       Row Name 09/27/24 1400          Transfers    Transfers sit-stand transfer;stand-sit transfer  -TC       Row Name 09/27/24 1400          Sit-Stand Transfer    Sit-Stand [place] (Transfers) standby assist  -TC     Assistive Device (Sit-Stand Transfers) walker, front-wheeled  -TC       Row Name 09/27/24 1400          Stand-Sit Transfer    Stand-Sit [place] (Transfers) standby assist  -TC     Assistive Device (Stand-Sit Transfers) walker, front-wheeled  -TC       Row Name 09/27/24 1400          Gait/Stairs (Locomotion)    Gait/Stairs Locomotion gait/ambulation assistive device  -     [place] Level (Gait) standby assist  -TC     Assistive Device (Gait) walker, front-wheeled  -TC     Patient was able to Ambulate yes  -TC     Distance in Feet (Gait) 50  -TC     Deviations/Abnormal Patterns (Gait) gait speed decreased  -TC       Row Name 09/27/24 1400          Balance    Balance Assessment standing dynamic balance  -TC     Dynamic Standing Balance contact guard  -TC     Position/Device Used, Standing Balance walker, front-wheeled  -TC       Row Name 09/27/24 1400          Plan of Care Review    Plan of Care Reviewed With patient  -TC     Outcome Evaluation Pt presents with decreased balance and coordination when standing and walking requiring external assist for safety due to inc fall risk. Pt would benefit from skilled PT services to address this deficit. Pt would benefit from discharge home s/p hospitalization.  -TC       Row Name 09/27/24 1400          Positioning and Restraints    Pre-Treatment Position in bed  -TC     Post Treatment Position bed  -TC      In Bed sitting EOB;call light within reach  -TC       Row Name 09/27/24 1400          Therapy Assessment/Plan (PT)    Rehab Potential (PT) good, to achieve stated therapy goals  -TC     Criteria for Skilled Interventions Met (PT) yes;meets criteria;skilled treatment is necessary  -TC     Therapy Frequency (PT) daily  -TC     Predicted Duration of Therapy Intervention (PT) 10 days  -TC     Problem List (PT) problems related to;balance;coordination;mobility;strength  -TC     Activity Limitations Related to Problem List (PT) unable to ambulate safely;unable to transfer safely  -TC       Row Name 09/27/24 1400          PT Evaluation Complexity    History, PT Evaluation Complexity 1-2 personal factors and/or comorbidities  -TC     Examination of Body Systems (PT Eval Complexity) 1-2 elements  -TC     Clinical Presentation (PT Evaluation Complexity) stable  -TC     Clinical Decision Making (PT Evaluation Complexity) low complexity  -TC     Overall Complexity (PT Evaluation Complexity) low complexity  -[facility] Name 09/27/24 1400          Therapy Plan Review/Discharge Plan (PT)    Therapy Plan Review (PT) evaluation/treatment results reviewed  -TC       Row Name 09/27/24 1400          Physical Therapy Goals    Transfer Goal Selection (PT) transfer, PT goal 1  -TC     Gait Training Goal Selection (PT) gait training, PT goal 1  -[facility] Name 09/27/24 1400          Transfer Goal 1 (PT)    Activity/Assistive Device (Transfer Goal 1, PT) sit-to-stand/stand-to-sit  -TC     [place] Level/Cues Needed (Transfer Goal 1, PT) independent  -TC     Time Frame (Transfer Goal 1, PT) 10 days  -[facility] Name 09/27/24 1400          Gait Training Goal 1 (PT)    Activity/Assistive Device (Gait Training Goal 1, PT) gait (walking locomotion)  -TC     [place] Level (Gait Training Goal 1, PT) independent  -TC     Distance (Gait Training Goal 1, PT) 250  -TC     Time Frame (Gait Training Goal 1, PT) 10 days  -TC                User Key  (r) = Recorded By, (t) = Taken By, (c) = Cosigned By      Initials Name Provider Type    TC Rosetta Sarkar PT Physical Therapist                    Physical Therapy Education       Title: PT OT SLP Therapies (Done)       Topic: Physical Therapy (Done)       Point: Mobility training (Done)       Learning Progress Summary             Patient Acceptance, E, VU by TC at 9/27/2024 1416                         Point: Home exercise program (Done)       Learning Progress Summary             Patient Acceptance, E, VU by TC at 9/27/2024 1416                         Point: Body mechanics (Done)       Learning Progress Summary             Patient Acceptance, E, VU by TC at 9/27/2024 1416                         Point: Precautions (Done)       Learning Progress Summary             Patient Acceptance, E, VU by TC at 9/27/2024 1416                                         User Key       Initials Effective Dates Name Provider Type Discipline    TC 06/18/24 -  Rosetta Sarkar PT Physical Therapist PT                  PT Recommendation and Plan  Anticipated Discharge Disposition (PT): home  Planned Therapy Interventions (PT): balance training, gait training, strengthening, patient/family education  Therapy Frequency (PT): daily  Plan of Care Reviewed With: patient  Outcome Evaluation: Pt presents with decreased balance and coordination when standing and walking requiring external assist for safety due to inc fall risk. Pt would benefit from skilled PT services to address this deficit. Pt would benefit from discharge home s/p hospitalization.   Outcome Measures       Row Name 09/27/24 1400             How much help from another person do you currently need...    Turning from your back to your side while in flat bed without using bedrails? 4  -TC      Moving from lying on back to sitting on the side of a flat bed without bedrails? 4  -TC      Moving to and from a bed to a chair (including a wheelchair)? 3  -TC       Standing up from a chair using your arms (e.g., wheelchair, bedside chair)? 3  -TC      Climbing 3-5 steps with a railing? 3  -TC      To walk in hospital room? 3  -TC      AM-PAC 6 Clicks Score (PT) 20  -TC      Highest Level of Mobility Goal 6 --> Walk 10 steps or more  -TC         Functional Assessment    Outcome Measure Options AM-PAC 6 Clicks Basic Mobility (PT)  -TC                User Key  (r) = Recorded By, (t) = Taken By, (c) = Cosigned By      Initials Name Provider Type    Rosetta Crespo, PT Physical Therapist                     Time Calculation:    PT Charges       Row Name 09/27/24 1417             Time Calculation    PT Received On 09/27/24  -TC      PT Goal Re-Cert Due Date 10/06/24  -TC         Untimed Charges    PT Eval/Re-eval Minutes 20  -TC         Total Minutes    Untimed Charges Total Minutes 20  -TC       Total Minutes 20  -TC                User Key  (r) = Recorded By, (t) = Taken By, (c) = Cosigned By      Initials Name Provider Type    Rosetta Crespo, PT Physical Therapist                  Therapy Charges for Today       Code Description Service Date Service Provider Modifiers Qty    18711733401 HC PT EVAL LOW COMPLEXITY 1 9/27/2024 Rosetta Sarkar, PT GP 1            PT G-Codes  Outcome Measure Options: AM-PAC 6 Clicks Basic Mobility (PT)  AM-PAC 6 Clicks Score (PT): 20    Rosetta Sarkar PT  9/27/2024

## 2024-09-27 NOTE — TELEPHONE ENCOUNTER
Patient called yesterday afternoon left . Patient stated she was having difficulty getting out of bed all day without feeling like she was going to pass out.     SW Patient. Patient went to ER today due to passing out. Patient has been admitted.     Patient asked about her results on her stress test. Please advise.     Asked patient to call the office once she was discharged to get scheduled with f/u.

## 2024-09-27 NOTE — ED PROVIDER NOTES
"Time: 3:19 AM EDT  Date of encounter:  9/27/2024  Independent Historian/Clinical History and Information was obtained by:   Patient and Family    History is limited by: N/A    Chief Complaint: Syncope      History of Present Illness:  Patient is a 54 y.o. year old female who presents to the emergency department for evaluation of syncope    Patient states she felt lightheaded and had a syncopal episode prior to arrival in the emergency department today.  States she has had severe nausea vomiting and diarrhea for the past 2 days.  States has been unable to tolerate anything by mouth.  She has had some mild abdominal cramping but denies abdominal pain at this time.  She states she has only had a very small amount of urination today which she approximates to be about a teaspoon.      Patient Care Team  Primary Care Provider: Guy Beatty MD    Past Medical History:     No Known Allergies  Past Medical History:   Diagnosis Date    Abnormal ECG Aug    Acute deep vein thrombosis (DVT) of right popliteal vein     Anemia     Anxiety     Arthritis     Atrial fibrillation     Bilateral pulmonary embolism     Hypertension     Legally blind     Neuropathy     some pains at night in the feet    Obesity     Pleuritic chest pain     Retinopathy     no retinopathy but does have \"blood behind the eyes\"; going for injections and laser treatment; she states her vision has improved some and she will be undergoing laser treatment    Thyroid disease     removed due to thyroid nodules     Type 2 diabetes mellitus      Past Surgical History:   Procedure Laterality Date    CATARACT EXTRACTION, BILATERAL Bilateral     08/16/2024 & 08/09/2024    CHOLECYSTECTOMY      EYE SURGERY Left 09/28/2022    HYSTERECTOMY      PARTIAL    THYROID SURGERY      US GUIDED FINE NEEDLE ASPIRATION  03/02/2020     Family History   Problem Relation Age of Onset    Diabetes type II Mother     Stroke Father     Diabetes type II Other         grandparents    " Asthma Sister        Home Medications:  Prior to Admission medications    Medication Sig Start Date End Date Taking? Authorizing Provider   albuterol (ACCUNEB) 0.63 MG/3ML nebulizer solution Take 3 mL by nebulization Every 6 (Six) Hours As Needed for Wheezing. 7/13/24  Yes Megan Cloud PA-C   albuterol sulfate  (90 Base) MCG/ACT inhaler Inhale 2 puffs Every 4 (Four) Hours As Needed for Wheezing or Shortness of Air. 6/9/24  Yes Fatmata Sam APRN   atorvastatin (LIPITOR) 10 MG tablet TAKE 1 TABLET BY MOUTH DAILY 8/9/24  Yes Guy Beatty MD   benazepril (LOTENSIN) 20 MG tablet Take 1 tablet by mouth Daily. 9/13/24  Yes Tatiana Tovar APRN   busPIRone (BUSPAR) 5 MG tablet Take 1 tablet by mouth 3 (Three) Times a Day. 7/31/24  Yes Guy Beatty MD   chlorthalidone (HYGROTON) 25 MG tablet TAKE 1 TABLET BY MOUTH EVERY DAY 9/21/23  Yes Guy Beatty MD   DULoxetine (CYMBALTA) 60 MG capsule Take 1 capsule by mouth Daily. 6/7/24  Yes Guy Beatty MD   Eliquis 5 MG tablet tablet TAKE 1 TABLET BY MOUTH TWICE DAILY 7/8/24  Yes Guy Beatty MD   levothyroxine (SYNTHROID, LEVOTHROID) 200 MCG tablet TAKE 1 TABLET BY MOUTH EVERY DAY 5/20/24  Yes Guy Beatty MD   liothyronine (CYTOMEL) 5 MCG tablet Take 1 tablet by mouth 2 (Two) Times a Day. 12/18/23  Yes Guy Beatty MD   metoprolol succinate XL (TOPROL-XL) 25 MG 24 hr tablet Take 1 tablet by mouth Every Night. 9/13/24  Yes Tatiana Tovar APRN   pantoprazole (PROTONIX) 40 MG EC tablet TAKE 1 TABLET BY MOUTH EVERY DAY 9/21/23  Yes Guy Beatty MD   rOPINIRole (REQUIP) 0.25 MG tablet TAKE 1 TABLET BY MOUTH EVERY NIGHT 8/9/24  Yes Guy Beatty MD   sertraline (Zoloft) 25 MG tablet Take 3 tablets by mouth Daily. Take 75 mg by mouth daily (one 25 mg and one 50 mg). 12/15/23  Yes Guy Beatty MD   amoxicillin-clavulanate (AUGMENTIN) 875-125 MG per tablet Take 1 tablet by mouth 2 (Two) Times a Day for 10 days.  9/17/24 9/27/24  Lanny Vera APRN   Blood Glucose Monitoring Suppl kit Monitor kit - instructed on use 7/3/24   Guy Beatty MD   famotidine (Pepcid) 20 MG tablet Take 1 tablet by mouth 2 (Two) Times a Day. 8/25/24   Vaughn Wilson MD   gabapentin (NEURONTIN) 300 MG capsule TAKE 1 CAPSULE BY MOUTH THREE TIMES DAILY 1/3/24   Guy Beatty MD   glucose blood test strip Use to check blood sugar 4 times per day 7/3/24   Guy Beatty MD   HYDROcodone-acetaminophen (NORCO) 5-325 MG per tablet Take 1 tablet by mouth 4 (Four) Times a Day As Needed for Moderate Pain. 8/25/24   Vaughn Wilson MD   hydrOXYzine (ATARAX) 25 MG tablet TAKE 1 TABLET BY MOUTH THREE TIMES DAILY AS NEEDED FOR ANXIETY 5/16/24   Guy Beatty MD   Insulin Aspart FlexPen 100 UNIT/ML solution pen-injector INJECT 5 UNITS UNDER THE SKIN THREE TIMES DAILY PRIOR TO MEALS, WITH ADDITIONAL SLIDING SCALE AS DIRECTED; MAX UNITS PER DAY IS 60 12/6/23   Guy Beatty MD   Insulin Glargine (LANTUS SOLOSTAR) 100 UNIT/ML injection pen Inject 40 Units under the skin into the appropriate area as directed Daily. 8/4/24   Guy Beatty MD   Lancets (onetouch ultrasoft) lancets Use as directed to check blood sugar 4 times per day 7/3/24   Guy Beatty MD   methylPREDNISolone (MEDROL) 4 MG dose pack Take as directed on package instructions. 9/17/24   Lanny Vera APRN   promethazine-dextromethorphan (PROMETHAZINE-DM) 6.25-15 MG/5ML syrup Take 5 mL by mouth 4 (Four) Times a Day As Needed for Cough. 6/9/24   Fatmata Sam APRN   Semaglutide, 1 MG/DOSE, (Ozempic, 1 MG/DOSE,) 4 MG/3ML solution pen-injector INJECT 1MG SUBCUTANEOUS ONCE WEEKLY FOR 9-12 2/21/24   Guy Beatty MD   sucralfate (Carafate) 1 g tablet Take 1 tablet by mouth 4 (Four) Times a Day. 4/11/24   Guy Beatty MD        Social History:   Social History     Tobacco Use    Smoking status: Never     Passive exposure: Never    Smokeless tobacco:  "Never   Vaping Use    Vaping status: Never Used   Substance Use Topics    Alcohol use: Not Currently     Comment: OCCASIONAL/SOCIAL    Drug use: Never         Review of Systems:  Review of Systems   Constitutional:  Positive for activity change, appetite change and fatigue. Negative for chills and fever.   HENT:  Negative for congestion, ear pain and sore throat.    Eyes:  Negative for pain.   Respiratory:  Negative for cough, chest tightness and shortness of breath.    Cardiovascular:  Negative for chest pain.   Gastrointestinal:  Positive for diarrhea, nausea and vomiting. Negative for abdominal pain.   Genitourinary:  Negative for flank pain and hematuria.   Musculoskeletal:  Negative for joint swelling.   Skin:  Negative for pallor.   Neurological:  Negative for seizures and headaches.   All other systems reviewed and are negative.       Physical Exam:  /59 (BP Location: Right arm, Patient Position: Lying)   Pulse 96   Temp 97.5 °F (36.4 °C) (Oral)   Resp 18   Ht 162.6 cm (64\")   Wt 99 kg (218 lb 4.1 oz)   SpO2 100%   BMI 37.46 kg/m²     Physical Exam  Vitals and nursing note reviewed.   Constitutional:       General: She is not in acute distress.     Appearance: Normal appearance. She is not toxic-appearing.   HENT:      Head: Normocephalic and atraumatic.      Jaw: There is normal jaw occlusion.      Mouth/Throat:      Mouth: Mucous membranes are dry.      Comments: Dry mucous membrane  Eyes:      General: Lids are normal.      Extraocular Movements: Extraocular movements intact.      Conjunctiva/sclera: Conjunctivae normal.      Pupils: Pupils are equal, round, and reactive to light.   Cardiovascular:      Rate and Rhythm: Normal rate and regular rhythm.      Pulses: Normal pulses.      Heart sounds: Normal heart sounds.   Pulmonary:      Effort: Pulmonary effort is normal. No respiratory distress.      Breath sounds: Normal breath sounds. No wheezing or rhonchi.   Abdominal:      General: " Abdomen is flat.      Palpations: Abdomen is soft.      Tenderness: There is no abdominal tenderness. There is no guarding or rebound.   Musculoskeletal:         General: Normal range of motion.      Cervical back: Normal range of motion and neck supple.      Right lower leg: No edema.      Left lower leg: No edema.   Skin:     General: Skin is warm and dry.   Neurological:      Mental Status: She is alert and oriented to person, place, and time. Mental status is at baseline.   Psychiatric:         Mood and Affect: Mood normal.                  Procedures:  Procedures      Medical Decision Making:      Comorbidities that affect care:    Diabetes, history of pulmonary embolism, chronic anticoagulation on Eliquis, atrial fibrillation,    External Notes reviewed:    Previous Clinic Note: Outpatient cardiology visit for paroxysmal atrial fibrillation.  9/13/2024      The following orders were placed and all results were independently analyzed by me:  Orders Placed This Encounter   Procedures    Clostridioides difficile Toxin - Stool, Per Rectum    Clostridioides difficile Toxin, PCR - Stool, Per Rectum    US Renal Bilateral    [place] Draw    Comprehensive Metabolic Panel    Magnesium    Single High Sensitivity Troponin T    CBC Auto Differential    Urinalysis With Culture If Indicated - Urine, Clean Catch    Hemoglobin A1c    TSH Rfx On Abnormal To Free T4    T4, Free    Urinalysis, Microscopic Only - Urine, Clean Catch    Basic Metabolic Panel    Magnesium    Phosphorus    Hepatic Function Panel    NPO Diet NPO Type: Strict NPO    Undress & Gown    Continuous Pulse Oximetry    Vital Signs    Vital Signs    Intake & Output    Weigh Patient    Oral Care    Saline Lock & Maintain IV Access    Activity - Ad Judie    Orthostatic Blood Pressure    Code Status and Medical Interventions: CPR (Attempt to Resuscitate); Full Support    Hospitalist (on-call MD unless specified)    Inpatient Consult to Advance Care Planning     Inpatient Nephrology Consult    PT Consult: Eval & Treat Functional Mobility Below Baseline    Oxygen Therapy- Nasal Cannula; Titrate 1-6 LPM Per SpO2; 90 - 95%    POC Glucose Once    POC Glucose 4x Daily Before Meals & at Bedtime    POC Glucose Once    ECG 12 Lead ED Triage Standing Order; Syncope    Insert Peripheral IV    Insert Peripheral IV    Inpatient Admission    CBC & Differential    Green Top (Gel)    Lavender Top    Gold Top - SST    Light Blue Top    CBC & Differential       Medications Given in the Emergency Department:  Medications   sodium chloride 0.9 % flush 10 mL (has no administration in time range)   sodium chloride 0.9 % flush 10 mL (has no administration in time range)   sodium chloride 0.9 % flush 10 mL (has no administration in time range)   sodium chloride 0.9 % infusion 40 mL (has no administration in time range)   sodium chloride 0.9 % infusion (125 mL/hr Intravenous New Bag 9/27/24 0604)   sennosides-docusate (PERICOLACE) 8.6-50 MG per tablet 2 tablet (has no administration in time range)     And   polyethylene glycol (MIRALAX) packet 17 g (has no administration in time range)     And   bisacodyl (DULCOLAX) EC tablet 5 mg (has no administration in time range)     And   bisacodyl (DULCOLAX) suppository 10 mg (has no administration in time range)   ondansetron (ZOFRAN) injection 4 mg (has no administration in time range)   pantoprazole (PROTONIX) injection 40 mg (40 mg Intravenous Given 9/27/24 0536)   metoclopramide (REGLAN) injection 10 mg (10 mg Intravenous Given 9/27/24 0536)   apixaban (ELIQUIS) tablet 5 mg (has no administration in time range)   gabapentin (NEURONTIN) capsule 300 mg (has no administration in time range)   dextrose (GLUTOSE) oral gel 15 g (has no administration in time range)   dextrose (D50W) (25 g/50 mL) IV injection 25 g (has no administration in time range)   glucagon (GLUCAGEN) injection 1 mg (has no administration in time range)   insulin glargine (LANTUS,  SEMGLEE) injection 25 Units (25 Units Subcutaneous Given 9/27/24 0605)   Insulin Lispro (humaLOG) injection 3-14 Units (has no administration in time range)   sodium chloride 0.9 % bolus 2,000 mL (2,000 mL Intravenous New Bag 9/27/24 0409)   ondansetron (ZOFRAN) injection 4 mg (4 mg Intravenous Given 9/27/24 0409)        ED Course:    ED Course as of 09/27/24 0731   Fri Sep 27, 2024   0404 My interpretation of EKG: Sinus rhythm 90, no acute ischemia [JS]   0404 Discussed patient's workup and presentation with the hospitalist who agrees to admission.  The patient's airway is intact, vital signs, and respiratory status are safe for admission at this time.   [JS]      ED Course User Index  [JS] Vaughn Wilson MD       Labs:    Lab Results (last 24 hours)       Procedure Component Value Units Date/Time    CBC & Differential [[id number]]  (Abnormal) Collected: 09/27/24 0242    Specimen: Blood Updated: 09/27/24 0248    Narrative:      The following orders were created for panel order CBC & Differential.  Procedure                               Abnormality         Status                     ---------                               -----------         ------                     CBC Auto Differential[[id number]]        Abnormal            Final result                 Please view results for these tests on the individual orders.    Comprehensive Metabolic Panel [[id number]]  (Abnormal) Collected: 09/27/24 0242    Specimen: Blood Updated: 09/27/24 0305     Glucose 349 mg/dL      BUN 68 mg/dL      Creatinine 4.81 mg/dL      Sodium 133 mmol/L      Potassium 4.9 mmol/L      Chloride 94 mmol/L      CO2 21.5 mmol/L      Calcium 9.4 mg/dL      Total Protein 7.0 g/dL      Albumin 3.6 g/dL      ALT (SGPT) 26 U/L      AST (SGOT) 13 U/L      Alkaline Phosphatase 105 U/L      Total Bilirubin 0.7 mg/dL      Globulin 3.4 gm/dL      A/G Ratio 1.1 g/dL      BUN/Creatinine Ratio 14.1     Anion Gap 17.5 mmol/L      eGFR 10.1 mL/min/1.73       Comment: <15 Indicative of kidney failure       Narrative:      GFR Normal >60  Chronic Kidney Disease <60  Kidney Failure <15      Magnesium [[id number]]  (Normal) Collected: 09/27/24 0242    Specimen: Blood Updated: 09/27/24 0305     Magnesium 2.2 mg/dL     Single High Sensitivity Troponin T [[id number]]  (Abnormal) Collected: 09/27/24 0242    Specimen: Blood Updated: 09/27/24 0305     HS Troponin T 19 ng/L     Narrative:      High Sensitive Troponin T Reference Range:  <14.0 ng/L- Negative Female for AMI  <22.0 ng/L- Negative Male for AMI  >=14 - Abnormal Female indicating possible myocardial injury.  >=22 - Abnormal Male indicating possible myocardial injury.   Clinicians would have to utilize clinical acumen, EKG, Troponin, and serial changes to determine if it is an Acute Myocardial Infarction or myocardial injury due to an underlying chronic condition.         CBC Auto Differential [[id number]]  (Abnormal) Collected: 09/27/24 0242    Specimen: Blood Updated: 09/27/24 0248     WBC 11.84 10*3/mm3      RBC 4.73 10*6/mm3      Hemoglobin 14.5 g/dL      Hematocrit 42.6 %      MCV 90.1 fL      MCH 30.7 pg      MCHC 34.0 g/dL      RDW 13.5 %      RDW-SD 44.8 fl      MPV 10.0 fL      Platelets 298 10*3/mm3      Neutrophil % 73.8 %      Lymphocyte % 16.2 %      Monocyte % 7.0 %      Eosinophil % 0.9 %      Basophil % 0.6 %      Immature Grans % 1.5 %      Neutrophils, Absolute 8.73 10*3/mm3      Lymphocytes, Absolute 1.92 10*3/mm3      Monocytes, Absolute 0.83 10*3/mm3      Eosinophils, Absolute 0.11 10*3/mm3      Basophils, Absolute 0.07 10*3/mm3      Immature Grans, Absolute 0.18 10*3/mm3      nRBC 0.0 /100 WBC     Hemoglobin A1c [[id number]] Collected: 09/27/24 0242    Specimen: Blood Updated: 09/27/24 0553    TSH Rfx On Abnormal To Free T4 [[id number]]  (Abnormal) Collected: 09/27/24 0242    Specimen: Blood Updated: 09/27/24 0615     TSH 0.072 uIU/mL     T4, Free [[id number]]  (Abnormal) Collected: 09/27/24 0242     Specimen: Blood Updated: 09/27/24 0654     Free T4 3.28 ng/dL     POC Glucose Once [[id number]]  (Abnormal) Collected: 09/27/24 0606    Specimen: Blood Updated: 09/27/24 0610     Glucose 227 mg/dL      Comment: Serial Number: [id number]:  [id number]       Urinalysis With Culture If Indicated - Urine, Clean Catch [[id number]]  (Abnormal) Collected: 09/27/24 0620    Specimen: Urine, Clean Catch Updated: 09/27/24 0638     Color, UA Yellow     Appearance, UA Cloudy     pH, UA <=5.0     Specific Gravity, UA 1.014     Glucose, UA Negative     Ketones, UA Negative     Bilirubin, UA Negative     Blood, UA Trace     Protein, UA Trace     Leuk Esterase, UA Small (1+)     Nitrite, UA Negative     Urobilinogen, UA 1.0 E.U./dL    Narrative:      In absence of clinical symptoms, the presence of pyuria, bacteria, and/or nitrites on the urinalysis result does not correlate with infection.    Urinalysis, Microscopic Only - Urine, Clean Catch [[id number]] Collected: 09/27/24 0620    Specimen: Urine, Clean Catch Updated: 09/27/24 0638             Imaging:    No Radiology Exams Resulted Within Past 24 Hours      Differential Diagnosis and Discussion:    Syncope: Differential diagnosis includes but is not limited to TIA, hyperventilation, aortic stenosis, pulmonary emboli, myocardial disease, bradycardia arrhythmia, heart block, tachyarrhythmia, vasovagal, orthostatic hypotension, ruptured AAA, aortic dissection, subarachnoid hemorrhage, seizure, hypoglycemia.  Weakness: Based on the patient's history, signs, and symptoms, the diffential diagnosis includes but is not limited to meningitis, stroke, sepsis, subarachnoid hemorrhage, intracranial bleeding, encephalitis, acute uti, dehydration, MS, myasthenia gravis, [place], migraine variant, neuromuscular disorders vertigo, electrolyte imbalance, and metabolic disorders.    All labs were reviewed and interpreted by me.  All X-rays impressions were independently interpreted by  me.  EKG was interpreted by me.    MDM     Amount and/or Complexity of Data Reviewed  Decide to obtain previous medical records or to obtain history from someone other than the patient: yes                 Patient Care Considerations:    SEPSIS IS NOT PRESENT IN THE EMERGENCY DEPARTMENT: Patient meets SIRS criteria in the emergency department however the patient does not have a known source of bacterial infection to confirm the diagnosis of sepsis.        Consultants/Shared Management Plan:    Hospitalist: I have discussed the case with the hospitalist who agrees to accept the patient for admission.    Social Determinants of Health:    Patient has presented with family members who are responsible, reliable and will ensure follow up care.      Disposition and Care Coordination:    Admit:   Through independent evaluation of the patient's history, physical, and imperical data, the patient meets criteria for inpatient admission to the hospital.        Final diagnoses:   Acute renal failure, unspecified acute renal failure type   Syncope, unspecified syncope type        ED Disposition       ED Disposition   Decision to Admit    Condition   --    Comment   Level of Care: Remote Telemetry [26]   Diagnosis: Acute renal failure [174078]   Admitting Physician: MARLIN DOYLE [[id number]]   Certification: I Certify That Inpatient Hospital Services Are Medically Necessary For Greater Than 2 Midnights                 This medical record created using voice recognition software.             Vaughn Wilson MD  09/27/24 0731

## 2024-09-27 NOTE — PAYOR COMM NOTE
"Carmelina Saldana (54 y.o. Female)       Date of Birth   1970    Social Security Number       Address   [address] [place] [address]    Home Phone   [phone]    MRN   [id number]       Zoroastrianism   Non-Druze    Marital Status                               Admission Date   24    Admission Type   Emergency    Admitting Provider   Juan F Mo MD    Attending Provider   Robert Salazar MD    Department, Room/Bed   [address], [facility]0/1       Discharge Date       Discharge Disposition       Discharge Destination                                 Attending Provider: Robert Salazar MD    Allergies: No Known Allergies    Isolation: Spore   Infection: C.difficile (rule out) (24)   Code Status: CPR    Ht: 162.6 cm (64\")   Wt: 99 kg (218 lb 4.1 oz)    Admission Cmt: None   Principal Problem: Acute renal failure [N17.9]                   Active Insurance as of 2024       Primary Coverage       Payor Plan Insurance Group Employer/Plan Group    [employer] [place] [employer] [place]ICA[place]        Payor Plan Address Payor Plan Phone Number Payor Plan Fax Number Effective Dates    [address] [address] [phone]  3/1/2024 - None Entered    [facility] [id number]         Subscriber Name Subscriber Birth Date Member ID       CARMELINA SALDANA 1970 [id number]                     Emergency Contacts        (Rel.) Home Phone Work Phone Mobile Phone    ABDULKADIR CAPONE (Mother) [phone] -- [phone]    OPAL SALDANA (Son) -- -- [phone]                 History & Physical        Juan F Mo MD at 24 45           [facility]   HISTORY AND PHYSICAL    Patient Name: Carmelina Saldana  : 1970  MRN: [id number]  Primary Care Physician:  Guy Beatty MD  Date of admission: 2024    Subjective  Subjective     Chief Complaint: Syncopal episode    HPI:    Carmelina Saldana is a 54 y.o. female with past medical " "history of diabetes, hypertension, A-fib on Eliquis, anxiety, hypothyroidism, obesity, anxiety, and GERD presented to the ED for evaluation of a syncopal episode.  Patient states that for the last week or so she has been having intractable nausea and vomiting to where she cannot keep anything down, along with nonbloody diarrhea and decreased p.o. intake.  She has also had minimal urine output.  Patient is on insulin at home and has been on Ozempic for over a year.  Around midnight patient got up to use the bathroom and became significantly lightheaded and dizzy and then passed out.  Patient denied sustaining any significant trauma from passing out.  EMS was called by family and she was brought to the ED for further evaluation.  In the ED patient was hypotensive on arrival which responded well to fluids.  Labs showed that she was in acute renal failure with hyperglycemia and mild leukocytosis.  When seen patient was feeling much better after fluids.  She denied any recent fevers, headaches, focal weakness, chest pain, palpitation, shortness of breath, cough, diarrhea, constipation, dysuria, hematuria, hematochezia, melena, or anxiety.  Patient admitted for further evaluation and treatment.    Review of Systems   All systems were reviewed and negative except for: As per HPI    Personal History     Past Medical History:   Diagnosis Date    Abnormal ECG Aug    Acute deep vein thrombosis (DVT) of right popliteal vein     Anemia     Anxiety     Arthritis     Atrial fibrillation     Bilateral pulmonary embolism     Hypertension     Legally blind     Neuropathy     some pains at night in the feet    Obesity     Pleuritic chest pain     Retinopathy     no retinopathy but does have \"blood behind the eyes\"; going for injections and laser treatment; she states her vision has improved some and she will be undergoing laser treatment    Thyroid disease     removed due to thyroid nodules     Type 2 diabetes mellitus        Past " Surgical History:   Procedure Laterality Date    CATARACT EXTRACTION, BILATERAL Bilateral     08/16/2024 & 08/09/2024    CHOLECYSTECTOMY      EYE SURGERY Left 09/28/2022    HYSTERECTOMY      PARTIAL    THYROID SURGERY      US GUIDED FINE NEEDLE ASPIRATION  03/02/2020       Family History: family history includes Asthma in her sister; Diabetes type II in her mother and another family member; Stroke in her father. Otherwise pertinent FHx was reviewed and not pertinent to current issue.    Social History:  reports that she has never smoked. She has never been exposed to tobacco smoke. She has never used smokeless tobacco. She reports that she does not currently use alcohol. She reports that she does not use drugs.    Home Medications:  Blood Glucose Monitoring Suppl, DULoxetine, HYDROcodone-acetaminophen, Insulin Aspart FlexPen, Insulin Glargine, Semaglutide (1 MG/DOSE), albuterol, albuterol sulfate HFA, amoxicillin-clavulanate, apixaban, atorvastatin, benazepril, busPIRone, chlorthalidone, famotidine, gabapentin, glucose blood, hydrOXYzine, levothyroxine, liothyronine, methylPREDNISolone, metoprolol succinate XL, onetouch ultrasoft, pantoprazole, promethazine-dextromethorphan, rOPINIRole, sertraline, and sucralfate      Allergies:  No Known Allergies    Objective  Objective     Vitals:   Temp:  [97.5 °F (36.4 °C)] 97.5 °F (36.4 °C)  Heart Rate:  [81-96] 96  Resp:  [18] 18  BP: ()/(47-72) 101/59  Physical Exam    Constitutional: Awake, alert   Eyes: PERRLA, sclerae anicteric, no conjunctival injection   HENT: NCAT, mucous membranes moist   Neck: Supple, no thyromegaly, no lymphadenopathy, trachea midline   Respiratory: Clear to auscultation bilaterally, nonlabored respirations    Cardiovascular: RRR, no murmurs, rubs, or gallops, palpable pedal pulses bilaterally   Gastrointestinal: Abdominal tenderness, positive bowel sounds, soft, nondistended   Musculoskeletal: No bilateral ankle edema, no clubbing or cyanosis  to extremities   Psychiatric: Appropriate affect, cooperative   Neurologic: Oriented x 3, strength symmetric in all extremities, Cranial Nerves grossly intact to confrontation, speech clear   Skin: No rashes     Result Review   Result Review:  I have personally reviewed the results from the time of this admission to 9/27/2024 05:45 EDT and agree with these findings:  [x]  Laboratory list / accordion  []  Microbiology  [x]  Radiology  [x]  EKG/Telemetry   []  Cardiology/Vascular   []  Pathology  []  Old records  []  Other:  Most notable findings include: Acute renal failure, leukocytosis, hyperglycemia, electrolyte imbalances       Assessment & Plan  Assessment / Plan     Brief Patient Summary:  Carmelina Saldana is a 54 y.o. female with past medical history of diabetes, hypertension, A-fib on Eliquis, anxiety, hypothyroidism, obesity, anxiety, and GERD presented to the ED for evaluation of a syncopal episode.    Active Hospital Problems:  Active Hospital Problems    Diagnosis     **Acute renal failure     Syncope     Intractable nausea and vomiting     Diarrhea     Paroxysmal A-fib     Type 2 diabetes mellitus with diabetic neuropathy, with long-term current use of insulin     History of pulmonary embolus (PE)     Essential hypertension     Class 3 severe obesity without serious comorbidity with body mass index (BMI) of 40.0 to 44.9 in adult      Plan:     Acute renal failure  -Admit to telemetry  -Patient with normal function at baseline  -Dehydration secondary to GI losses with superimposed poor p.o. intake  -IVF  -UA ordered and pending  -Avoid nephrotoxic drugs  -Consult nephrology  -Will follow labs  -Supportive care    Syncope  -Likely secondary to above  -Patient on BP meds including beta-blockers  -Fall precautions  -EKG reviewed  -Initial troponin negative, will trend  -TSH  -Orthostatic Vitals  -IVF  -Further workup and consult if needed    Intractable nausea and vomiting  -Possible  gastroparesis  -IVF  -Reglan 3 times daily  -Zofran as needed  -Further workup including gastric emptying study if warranted  -Consult GI if warranted    Diarrhea  -Patient recently on antibiotics  -Differential ordered and pending  -IVF    Diabetes  -Insulin sliding scale  -Levemir at bedtime  -Titrate as needed    A-fib  -Resume Eliquis    Hypothyroidism    GI ppx        VTE Prophylaxis:  Pharmacologic VTE prophylaxis orders are present.        CODE STATUS:    Level Of Support Discussed With: Patient  Code Status (Patient has no pulse and is not breathing): CPR (Attempt to Resuscitate)  Medical Interventions (Patient has pulse or is breathing): Full Support    Admission Status:  I believe this patient meets inpatient status.      Electronically signed by Juan F Mo MD, 09/27/24, 5:45 AM EDT.    Electronically signed by Juan F Mo MD at 09/27/24 0602          Emergency Department Notes        Vaughn Wilson MD at 09/27/24 0319          Time: 3:19 AM EDT  Date of encounter:  9/27/2024  Independent Historian/Clinical History and Information was obtained by:   Patient and Family    History is limited by: N/A    Chief Complaint: Syncope      History of Present Illness:  Patient is a 54 y.o. year old female who presents to the emergency department for evaluation of syncope    Patient states she felt lightheaded and had a syncopal episode prior to arrival in the emergency department today.  States she has had severe nausea vomiting and diarrhea for the past 2 days.  States has been unable to tolerate anything by mouth.  She has had some mild abdominal cramping but denies abdominal pain at this time.  She states she has only had a very small amount of urination today which she approximates to be about a teaspoon.      Patient Care Team  Primary Care Provider: Guy Beatty MD    Past Medical History:     No Known Allergies  Past Medical History:   Diagnosis Date    Abnormal ECG Aug    Acute deep vein  "thrombosis (DVT) of right popliteal vein     Anemia     Anxiety     Arthritis     Atrial fibrillation     Bilateral pulmonary embolism     Hypertension     Legally blind     Neuropathy     some pains at night in the feet    Obesity     Pleuritic chest pain     Retinopathy     no retinopathy but does have \"blood behind the eyes\"; going for injections and laser treatment; she states her vision has improved some and she will be undergoing laser treatment    Thyroid disease     removed due to thyroid nodules     Type 2 diabetes mellitus      Past Surgical History:   Procedure Laterality Date    CATARACT EXTRACTION, BILATERAL Bilateral     08/16/2024 & 08/09/2024    CHOLECYSTECTOMY      EYE SURGERY Left 09/28/2022    HYSTERECTOMY      PARTIAL    THYROID SURGERY      US GUIDED FINE NEEDLE ASPIRATION  03/02/2020     Family History   Problem Relation Age of Onset    Diabetes type II Mother     Stroke Father     Diabetes type II Other         grandparents    Asthma Sister        Home Medications:  Prior to Admission medications    Medication Sig Start Date End Date Taking? Authorizing Provider   albuterol (ACCUNEB) 0.63 MG/3ML nebulizer solution Take 3 mL by nebulization Every 6 (Six) Hours As Needed for Wheezing. 7/13/24  Yes Megan Cloud PA-C   albuterol sulfate  (90 Base) MCG/ACT inhaler Inhale 2 puffs Every 4 (Four) Hours As Needed for Wheezing or Shortness of Air. 6/9/24  Yes Fatmata Sam APRN   atorvastatin (LIPITOR) 10 MG tablet TAKE 1 TABLET BY MOUTH DAILY 8/9/24  Yes Guy Beatty MD   benazepril (LOTENSIN) 20 MG tablet Take 1 tablet by mouth Daily. 9/13/24  Yes Tatiana Tovar APRN   busPIRone (BUSPAR) 5 MG tablet Take 1 tablet by mouth 3 (Three) Times a Day. 7/31/24  Yes Guy Beatty MD   chlorthalidone (HYGROTON) 25 MG tablet TAKE 1 TABLET BY MOUTH EVERY DAY 9/21/23  Yes Guy Beatty MD   DULoxetine (CYMBALTA) 60 MG capsule Take 1 capsule by mouth Daily. 6/7/24  Yes " Guy Beatty MD   Eliquis 5 MG tablet tablet TAKE 1 TABLET BY MOUTH TWICE DAILY 7/8/24  Yes Guy Beatty MD   levothyroxine (SYNTHROID, LEVOTHROID) 200 MCG tablet TAKE 1 TABLET BY MOUTH EVERY DAY 5/20/24  Yes Guy Beatty MD   liothyronine (CYTOMEL) 5 MCG tablet Take 1 tablet by mouth 2 (Two) Times a Day. 12/18/23  Yes Guy Beatty MD   metoprolol succinate XL (TOPROL-XL) 25 MG 24 hr tablet Take 1 tablet by mouth Every Night. 9/13/24  Yes Tatiana Tovar APRN   pantoprazole (PROTONIX) 40 MG EC tablet TAKE 1 TABLET BY MOUTH EVERY DAY 9/21/23  Yes Guy Beatty MD   rOPINIRole (REQUIP) 0.25 MG tablet TAKE 1 TABLET BY MOUTH EVERY NIGHT 8/9/24  Yes Guy Beatty MD   sertraline (Zoloft) 25 MG tablet Take 3 tablets by mouth Daily. Take 75 mg by mouth daily (one 25 mg and one 50 mg). 12/15/23  Yes Guy Beatty MD   amoxicillin-clavulanate (AUGMENTIN) 875-125 MG per tablet Take 1 tablet by mouth 2 (Two) Times a Day for 10 days. 9/17/24 9/27/24  Lanny Vera APRN   Blood Glucose Monitoring Suppl kit Monitor kit - instructed on use 7/3/24   Guy Beatty MD   famotidine (Pepcid) 20 MG tablet Take 1 tablet by mouth 2 (Two) Times a Day. 8/25/24   Vaughn Wilson MD   gabapentin (NEURONTIN) 300 MG capsule TAKE 1 CAPSULE BY MOUTH THREE TIMES DAILY 1/3/24   Guy Beatty MD   glucose blood test strip Use to check blood sugar 4 times per day 7/3/24   Guy Beatty MD   HYDROcodone-acetaminophen (NORCO) 5-325 MG per tablet Take 1 tablet by mouth 4 (Four) Times a Day As Needed for Moderate Pain. 8/25/24   Vaughn Wilson MD   hydrOXYzine (ATARAX) 25 MG tablet TAKE 1 TABLET BY MOUTH THREE TIMES DAILY AS NEEDED FOR ANXIETY 5/16/24   Guy Beatty MD   Insulin Aspart FlexPen 100 UNIT/ML solution pen-injector INJECT 5 UNITS UNDER THE SKIN THREE TIMES DAILY PRIOR TO MEALS, WITH ADDITIONAL SLIDING SCALE AS DIRECTED; MAX UNITS PER DAY IS 60 12/6/23   Guy Beatty MD   Insulin  Glargine (LANTUS SOLOSTAR) 100 UNIT/ML injection pen Inject 40 Units under the skin into the appropriate area as directed Daily. 8/4/24   Guy Beatty MD   Lancets (onetouch ultrasoft) lancets Use as directed to check blood sugar 4 times per day 7/3/24   Guy Beatty MD   methylPREDNISolone (MEDROL) 4 MG dose pack Take as directed on package instructions. 9/17/24   Lanny Vera APRN   promethazine-dextromethorphan (PROMETHAZINE-DM) 6.25-15 MG/5ML syrup Take 5 mL by mouth 4 (Four) Times a Day As Needed for Cough. 6/9/24   Fatmata Sam APRN   Semaglutide, 1 MG/DOSE, (Ozempic, 1 MG/DOSE,) 4 MG/3ML solution pen-injector INJECT 1MG SUBCUTANEOUS ONCE WEEKLY FOR 9-12 2/21/24   Guy Baetty MD   sucralfate (Carafate) 1 g tablet Take 1 tablet by mouth 4 (Four) Times a Day. 4/11/24   Guy Beatty MD        Social History:   Social History     Tobacco Use    Smoking status: Never     Passive exposure: Never    Smokeless tobacco: Never   Vaping Use    Vaping status: Never Used   Substance Use Topics    Alcohol use: Not Currently     Comment: OCCASIONAL/SOCIAL    Drug use: Never         Review of Systems:  Review of Systems   Constitutional:  Positive for activity change, appetite change and fatigue. Negative for chills and fever.   HENT:  Negative for congestion, ear pain and sore throat.    Eyes:  Negative for pain.   Respiratory:  Negative for cough, chest tightness and shortness of breath.    Cardiovascular:  Negative for chest pain.   Gastrointestinal:  Positive for diarrhea, nausea and vomiting. Negative for abdominal pain.   Genitourinary:  Negative for flank pain and hematuria.   Musculoskeletal:  Negative for joint swelling.   Skin:  Negative for pallor.   Neurological:  Negative for seizures and headaches.   All other systems reviewed and are negative.       Physical Exam:  /59 (BP Location: Right arm, Patient Position: Lying)   Pulse 96   Temp 97.5 °F (36.4 °C) (Oral)    "Resp 18   Ht 162.6 cm (64\")   Wt 99 kg (218 lb 4.1 oz)   SpO2 100%   BMI 37.46 kg/m²     Physical Exam  Vitals and nursing note reviewed.   Constitutional:       General: She is not in acute distress.     Appearance: Normal appearance. She is not toxic-appearing.   HENT:      Head: Normocephalic and atraumatic.      Jaw: There is normal jaw occlusion.      Mouth/Throat:      Mouth: Mucous membranes are dry.      Comments: Dry mucous membrane  Eyes:      General: Lids are normal.      Extraocular Movements: Extraocular movements intact.      Conjunctiva/sclera: Conjunctivae normal.      Pupils: Pupils are equal, round, and reactive to light.   Cardiovascular:      Rate and Rhythm: Normal rate and regular rhythm.      Pulses: Normal pulses.      Heart sounds: Normal heart sounds.   Pulmonary:      Effort: Pulmonary effort is normal. No respiratory distress.      Breath sounds: Normal breath sounds. No wheezing or rhonchi.   Abdominal:      General: Abdomen is flat.      Palpations: Abdomen is soft.      Tenderness: There is no abdominal tenderness. There is no guarding or rebound.   Musculoskeletal:         General: Normal range of motion.      Cervical back: Normal range of motion and neck supple.      Right lower leg: No edema.      Left lower leg: No edema.   Skin:     General: Skin is warm and dry.   Neurological:      Mental Status: She is alert and oriented to person, place, and time. Mental status is at baseline.   Psychiatric:         Mood and Affect: Mood normal.                  Procedures:  Procedures      Medical Decision Making:      Comorbidities that affect care:    Diabetes, history of pulmonary embolism, chronic anticoagulation on Eliquis, atrial fibrillation,    External Notes reviewed:    Previous Clinic Note: Outpatient cardiology visit for paroxysmal atrial fibrillation.  9/13/2024      The following orders were placed and all results were independently analyzed by me:  Orders Placed This " Encounter   Procedures    Clostridioides difficile Toxin - Stool, Per Rectum    Clostridioides difficile Toxin, PCR - Stool, Per Rectum    US Renal Bilateral    Elmer Draw    Comprehensive Metabolic Panel    Magnesium    Single High Sensitivity Troponin T    CBC Auto Differential    Urinalysis With Culture If Indicated - Urine, Clean Catch    Hemoglobin A1c    TSH Rfx On Abnormal To Free T4    T4, Free    Urinalysis, Microscopic Only - Urine, Clean Catch    Basic Metabolic Panel    Magnesium    Phosphorus    Hepatic Function Panel    NPO Diet NPO Type: Strict NPO    Undress & Gown    Continuous Pulse Oximetry    Vital Signs    Vital Signs    Intake & Output    Weigh Patient    Oral Care    Saline Lock & Maintain IV Access    Activity - Ad Judie    Orthostatic Blood Pressure    Code Status and Medical Interventions: CPR (Attempt to Resuscitate); Full Support    Hospitalist (on-call MD unless specified)    Inpatient Consult to Advance Care Planning    Inpatient Nephrology Consult    PT Consult: Eval & Treat Functional Mobility Below Baseline    Oxygen Therapy- Nasal Cannula; Titrate 1-6 LPM Per SpO2; 90 - 95%    POC Glucose Once    POC Glucose 4x Daily Before Meals & at Bedtime    POC Glucose Once    ECG 12 Lead ED Triage Standing Order; Syncope    Insert Peripheral IV    Insert Peripheral IV    Inpatient Admission    CBC & Differential    Green Top (Gel)    Lavender Top    Gold Top - SST    Light Blue Top    CBC & Differential       Medications Given in the Emergency Department:  Medications   sodium chloride 0.9 % flush 10 mL (has no administration in time range)   sodium chloride 0.9 % flush 10 mL (has no administration in time range)   sodium chloride 0.9 % flush 10 mL (has no administration in time range)   sodium chloride 0.9 % infusion 40 mL (has no administration in time range)   sodium chloride 0.9 % infusion (125 mL/hr Intravenous New Bag 9/27/24 0604)   sennosides-docusate (PERICOLACE) 8.6-50 MG per tablet  2 tablet (has no administration in time range)     And   polyethylene glycol (MIRALAX) packet 17 g (has no administration in time range)     And   bisacodyl (DULCOLAX) EC tablet 5 mg (has no administration in time range)     And   bisacodyl (DULCOLAX) suppository 10 mg (has no administration in time range)   ondansetron (ZOFRAN) injection 4 mg (has no administration in time range)   pantoprazole (PROTONIX) injection 40 mg (40 mg Intravenous Given 9/27/24 0536)   metoclopramide (REGLAN) injection 10 mg (10 mg Intravenous Given 9/27/24 0536)   apixaban (ELIQUIS) tablet 5 mg (has no administration in time range)   gabapentin (NEURONTIN) capsule 300 mg (has no administration in time range)   dextrose (GLUTOSE) oral gel 15 g (has no administration in time range)   dextrose (D50W) (25 g/50 mL) IV injection 25 g (has no administration in time range)   glucagon (GLUCAGEN) injection 1 mg (has no administration in time range)   insulin glargine (LANTUS, SEMGLEE) injection 25 Units (25 Units Subcutaneous Given 9/27/24 0605)   Insulin Lispro (humaLOG) injection 3-14 Units (has no administration in time range)   sodium chloride 0.9 % bolus 2,000 mL (2,000 mL Intravenous New Bag 9/27/24 0409)   ondansetron (ZOFRAN) injection 4 mg (4 mg Intravenous Given 9/27/24 0409)        ED Course:    ED Course as of 09/27/24 0731   Fri Sep 27, 2024   0404 My interpretation of EKG: Sinus rhythm 90, no acute ischemia [JS]   0404 Discussed patient's workup and presentation with the hospitalist who agrees to admission.  The patient's airway is intact, vital signs, and respiratory status are safe for admission at this time.   [JS]      ED Course User Index  [JS] Vaughn Wilson MD       Labs:    Lab Results (last 24 hours)       Procedure Component Value Units Date/Time    CBC & Differential [[id number]]  (Abnormal) Collected: 09/27/24 0242    Specimen: Blood Updated: 09/27/24 0248    Narrative:      The following orders were created for panel order  CBC & Differential.  Procedure                               Abnormality         Status                     ---------                               -----------         ------                     CBC Auto Differential[[id number]]        Abnormal            Final result                 Please view results for these tests on the individual orders.    Comprehensive Metabolic Panel [[id number]]  (Abnormal) Collected: 09/27/24 0242    Specimen: Blood Updated: 09/27/24 0305     Glucose 349 mg/dL      BUN 68 mg/dL      Creatinine 4.81 mg/dL      Sodium 133 mmol/L      Potassium 4.9 mmol/L      Chloride 94 mmol/L      CO2 21.5 mmol/L      Calcium 9.4 mg/dL      Total Protein 7.0 g/dL      Albumin 3.6 g/dL      ALT (SGPT) 26 U/L      AST (SGOT) 13 U/L      Alkaline Phosphatase 105 U/L      Total Bilirubin 0.7 mg/dL      Globulin 3.4 gm/dL      A/G Ratio 1.1 g/dL      BUN/Creatinine Ratio 14.1     Anion Gap 17.5 mmol/L      eGFR 10.1 mL/min/1.73      Comment: <15 Indicative of kidney failure       Narrative:      GFR Normal >60  Chronic Kidney Disease <60  Kidney Failure <15      Magnesium [[id number]]  (Normal) Collected: 09/27/24 0242    Specimen: Blood Updated: 09/27/24 0305     Magnesium 2.2 mg/dL     Single High Sensitivity Troponin T [[id number]]  (Abnormal) Collected: 09/27/24 0242    Specimen: Blood Updated: 09/27/24 0305     HS Troponin T 19 ng/L     Narrative:      High Sensitive Troponin T Reference Range:  <14.0 ng/L- Negative Female for AMI  <22.0 ng/L- Negative Male for AMI  >=14 - Abnormal Female indicating possible myocardial injury.  >=22 - Abnormal Male indicating possible myocardial injury.   Clinicians would have to utilize clinical acumen, EKG, Troponin, and serial changes to determine if it is an Acute Myocardial Infarction or myocardial injury due to an underlying chronic condition.         CBC Auto Differential [[id number]]  (Abnormal) Collected: 09/27/24 0242    Specimen: Blood Updated: 09/27/24 0248      WBC 11.84 10*3/mm3      RBC 4.73 10*6/mm3      Hemoglobin 14.5 g/dL      Hematocrit 42.6 %      MCV 90.1 fL      MCH 30.7 pg      MCHC 34.0 g/dL      RDW 13.5 %      RDW-SD 44.8 fl      MPV 10.0 fL      Platelets 298 10*3/mm3      Neutrophil % 73.8 %      Lymphocyte % 16.2 %      Monocyte % 7.0 %      Eosinophil % 0.9 %      Basophil % 0.6 %      Immature Grans % 1.5 %      Neutrophils, Absolute 8.73 10*3/mm3      Lymphocytes, Absolute 1.92 10*3/mm3      Monocytes, Absolute 0.83 10*3/mm3      Eosinophils, Absolute 0.11 10*3/mm3      Basophils, Absolute 0.07 10*3/mm3      Immature Grans, Absolute 0.18 10*3/mm3      nRBC 0.0 /100 WBC     Hemoglobin A1c [[id number]] Collected: 09/27/24 0242    Specimen: Blood Updated: 09/27/24 0553    TSH Rfx On Abnormal To Free T4 [[id number]]  (Abnormal) Collected: 09/27/24 0242    Specimen: Blood Updated: 09/27/24 0615     TSH 0.072 uIU/mL     T4, Free [[id number]]  (Abnormal) Collected: 09/27/24 0242    Specimen: Blood Updated: 09/27/24 0654     Free T4 3.28 ng/dL     POC Glucose Once [[id number]]  (Abnormal) Collected: 09/27/24 0606    Specimen: Blood Updated: 09/27/24 0610     Glucose 227 mg/dL      Comment: Serial Number: [id number]:  [id number]       Urinalysis With Culture If Indicated - Urine, Clean Catch [[id number]]  (Abnormal) Collected: 09/27/24 0620    Specimen: Urine, Clean Catch Updated: 09/27/24 0638     Color, UA Yellow     Appearance, UA Cloudy     pH, UA <=5.0     Specific Gravity, UA 1.014     Glucose, UA Negative     Ketones, UA Negative     Bilirubin, UA Negative     Blood, UA Trace     Protein, UA Trace     Leuk Esterase, UA Small (1+)     Nitrite, UA Negative     Urobilinogen, UA 1.0 E.U./dL    Narrative:      In absence of clinical symptoms, the presence of pyuria, bacteria, and/or nitrites on the urinalysis result does not correlate with infection.    Urinalysis, Microscopic Only - Urine, Clean Catch [[id number]] Collected: 09/27/24 0620    Specimen: Urine,  Clean Catch Updated: 09/27/24 0638             Imaging:    No Radiology Exams Resulted Within Past 24 Hours      Differential Diagnosis and Discussion:    Syncope: Differential diagnosis includes but is not limited to TIA, hyperventilation, aortic stenosis, pulmonary emboli, myocardial disease, bradycardia arrhythmia, heart block, tachyarrhythmia, vasovagal, orthostatic hypotension, ruptured AAA, aortic dissection, subarachnoid hemorrhage, seizure, hypoglycemia.  Weakness: Based on the patient's history, signs, and symptoms, the diffential diagnosis includes but is not limited to meningitis, stroke, sepsis, subarachnoid hemorrhage, intracranial bleeding, encephalitis, acute uti, dehydration, MS, myasthenia gravis, Guillan [place], migraine variant, neuromuscular disorders vertigo, electrolyte imbalance, and metabolic disorders.    All labs were reviewed and interpreted by me.  All X-rays impressions were independently interpreted by me.  EKG was interpreted by me.    MDM     Amount and/or Complexity of Data Reviewed  Decide to obtain previous medical records or to obtain history from someone other than the patient: yes                 Patient Care Considerations:    SEPSIS IS NOT PRESENT IN THE EMERGENCY DEPARTMENT: Patient meets SIRS criteria in the emergency department however the patient does not have a known source of bacterial infection to confirm the diagnosis of sepsis.        Consultants/Shared Management Plan:    Hospitalist: I have discussed the case with the hospitalist who agrees to accept the patient for admission.    Social Determinants of Health:    Patient has presented with family members who are responsible, reliable and will ensure follow up care.      Disposition and Care Coordination:    Admit:   Through independent evaluation of the patient's history, physical, and imperical data, the patient meets criteria for inpatient admission to the hospital.        Final diagnoses:   Acute renal failure,  unspecified acute renal failure type   Syncope, unspecified syncope type        ED Disposition       ED Disposition   Decision to Admit    Condition   --    Comment   Level of Care: Remote Telemetry [26]   Diagnosis: Acute renal failure [235259]   Admitting Physician: MARLIN DOYLE [[id number]]   Certification: I Certify That Inpatient Hospital Services Are Medically Necessary For Greater Than 2 Midnights                 This medical record created using voice recognition software.             Vaughn Wilson MD  09/27/24 0731      Electronically signed by Vaughn Wilson MD at 09/27/24 0731       Vital Signs (last day)       Date/Time Temp Temp src Pulse Resp BP Patient Position SpO2    09/27/24 1127 -- -- -- -- 76/58 Lying --    09/27/24 0911 -- -- -- -- 78/48 Lying --    09/27/24 0744 -- -- -- -- 75/57 Lying --    09/27/24 0742 -- -- -- -- 86/63 Sitting --    09/27/24 0740 97.7 (36.5) Oral 79 16 84/66 Lying 97    09/27/24 0511 97.5 (36.4) Oral 96 18 101/59 Lying 100    09/27/24 0432 -- -- 85 -- 94/63 -- 99    09/27/24 0417 -- -- 81 -- 95/67 -- 99    09/27/24 0402 -- -- 91 -- 106/72 -- 99    09/27/24 0347 -- -- 89 -- 100/60 -- 97    09/27/24 0242 -- -- 92 18 88/47 Sitting 97             Physician Progress Notes (last 24 hours)        Robert Salazar MD at 09/27/24 8963          Reviewed H&P, agree with assessment and plan.    54-year-old female with history of diabetes, hypertension, A-fib on long-term anticoagulation Eliquis, anxiety, hypothyroidism, obesity coming GERD without esophagitis, passed out, syncope and collapse, found to be dehydrated, volume depleted in the setting of ongoing diarrhea, SUSANNA, nephrology consulted, placed on IV fluids, urinalysis suggesting UTI, pending culture, low TSH, high free T4, on levothyroxine, will need dose adjustment    Clinical course to dictate further management, discussed with nurse at bedside    Electronically signed by Robert Salazar MD, 9/27/2024, 10:55  EDT.    Portions of this documentation were transcribed electronically from a voice recognition software.  I confirm all data accurately represents the service(s) I performed at today's visit.        Electronically signed by Robert Salazar MD at 24 1055          Consult Notes (last 24 hours)        Deanna Lange MD at 24 0835        Consult Orders    1. Inpatient Nephrology Consult [[id number]] ordered by Juan F Mo MD at 24 0604                   [facility]   Consult Note    Patient Name: Carmelina Saldana  : 1970  MRN: [id number]  Primary Care Physician:  Guy Beatty MD  Referring Physician: No Known Provider  Date of admission: 2024    Subjective   Subjective     Reason for Consult/ Chief Complaint: SUSANNA    HPI:  Carmelina Saldana is a 54 y.o. female 54-year-old female with past medical history of CKD stage III with baseline creatinine 1.1-1.3, hypertension, diabetes, atrial fibrillation on anticoagulation, hypothyroidism, morbid obesity, anxiety/depression, GERD who came in with syncopal episodes.  Patient has been having nausea and vomiting for the last 1 week and unable to keep anything down.  She also has been having occasional diarrhea.  She has noted to have decreased urine output as well.  Overnight she had gotten up to use the restroom however she felt dizzy and passed out.  EMS was subsequently called and patient presented to the ER    Patient's blood pressures were noted to be low at the time of admission.  CBC with mild elevation in white count to 12.  Creatinine was noted to be significantly elevated at 4.8.  Urinalysis showed many hyaline casts.  Patient admitted for further management of symptoms.  Renal ultrasound was unremarkable.  Nephrology has been consulted for management of SUSANNA    Review of Systems  All review of systems negative except as given below.    Personal History     Past Medical History:   Diagnosis Date    Abnormal ECG Aug  "   Acute deep vein thrombosis (DVT) of right popliteal vein     Anemia     Anxiety     Arthritis     Atrial fibrillation     Bilateral pulmonary embolism     Hypertension     Legally blind     Neuropathy     some pains at night in the feet    Obesity     Pleuritic chest pain     Retinopathy     no retinopathy but does have \"blood behind the eyes\"; going for injections and laser treatment; she states her vision has improved some and she will be undergoing laser treatment    Thyroid disease     removed due to thyroid nodules     Type 2 diabetes mellitus        Past Surgical History:   Procedure Laterality Date    CATARACT EXTRACTION, BILATERAL Bilateral     08/16/2024 & 08/09/2024    CHOLECYSTECTOMY      EYE SURGERY Left 09/28/2022    HYSTERECTOMY      PARTIAL    THYROID SURGERY      US GUIDED FINE NEEDLE ASPIRATION  03/02/2020       Family History: family history includes Asthma in her sister; Diabetes type II in her mother and another family member; Stroke in her father. Otherwise pertinent FHx was reviewed and not pertinent to current issue.    Social History:  reports that she has never smoked. She has never been exposed to tobacco smoke. She has never used smokeless tobacco. She reports that she does not currently use alcohol. She reports that she does not use drugs.    Home Medications:  Blood Glucose Monitoring Suppl, DULoxetine, HYDROcodone-acetaminophen, Insulin Aspart FlexPen, Insulin Glargine, Semaglutide (1 MG/DOSE), albuterol, albuterol sulfate HFA, amoxicillin-clavulanate, apixaban, atorvastatin, benazepril, busPIRone, chlorthalidone, famotidine, gabapentin, glucose blood, hydrOXYzine, levothyroxine, liothyronine, methylPREDNISolone, metoprolol succinate XL, onetouch ultrasoft, pantoprazole, promethazine-dextromethorphan, rOPINIRole, sertraline, and sucralfate    Allergies:  No Known Allergies    Objective    Objective     Vitals:   Temp:  [97.5 °F (36.4 °C)] 97.5 °F (36.4 °C)  Heart Rate:  [81-96] " 96  Resp:  [18] 18  BP: ()/(47-72) 101/59    Physical Exam:             Constitutional:         Awake, alert responsive, conversant, no obvious distress   Eyes:                       PERRLA, sclerae anicteric, no conjunctival injection   HEENT:                   Moist mucous membranes, no nasal or eye discharge, no throat congestion   Neck:                      Supple, no thyromegaly, no lymphadenopathy, trachea midline, no elevated JVD   Respiratory:           Clear to auscultation bilaterally, nonlabored respirations    Cardiovascular:     RRR, no murmurs, rubs, or gallops, palpable pedal pulses bilaterally, No bilateral ankle edema   Gastrointestinal:   Positive bowel sounds, soft, nontender, non-distended, no organomegaly   Musculoskeletal:  No clubbing or cyanosis to extremities, muscle wasting, joint swelling, muscle weakness   Psychiatric:              Appropriate affect, cooperative   Neurologic:            Awake alert, oriented x 3, strength symmetric in all extremities, Cranial Nerves grossly intact to confrontation, speech clear   Skin:                      No rashes, bruising, skin ulcers, petechiae or ecchymosis    Result Review    Result Review:  I have personally reviewed the results from the time of this admission to 9/27/2024 08:35 EDT and agree with these findings:  []  Laboratory  []  Microbiology  []  Radiology  []  EKG/Telemetry   []  Cardiology/Vascular   []  Pathology  []  Old records  []  Other:    Results from last 7 days   Lab Units 09/27/24  0242   WBC 10*3/mm3 11.84*   HEMOGLOBIN g/dL 14.5   PLATELETS 10*3/mm3 298     Results from last 7 days   Lab Units 09/27/24  0242   SODIUM mmol/L 133*   POTASSIUM mmol/L 4.9   CHLORIDE mmol/L 94*   CO2 mmol/L 21.5*   ANION GAP mmol/L 17.5*   BUN mg/dL 68*   CREATININE mg/dL 4.81*   GLUCOSE mg/dL 349*       Assessment & Plan   Assessment / Plan     Active Hospital Problems:  Active Hospital Problems    Diagnosis     **Acute renal failure      Syncope     Intractable nausea and vomiting     Diarrhea     Paroxysmal A-fib     Type 2 diabetes mellitus with diabetic neuropathy, with long-term current use of insulin     History of pulmonary embolus (PE)     Essential hypertension     Class 3 severe obesity without serious comorbidity with body mass index (BMI) of 40.0 to 44.9 in adult      4-year-old female with past medical history of CKD stage III with baseline creatinine 1.1-1.3, hypertension, diabetes, atrial fibrillation on anticoagulation, history of PE, hypothyroidism, morbid obesity, anxiety/depression, GERD who came in with syncopal episode in background of dizziness lightheadedness with diarrhea and poor p.o. intake and decreased urine output associate with an SUSANNA and creatinine rise to 4.8 while being on chlorthalidone and benazepril.  Urine analysis with many hyaline casts.    Plan:   Continue to hold antihypertensives  Agree with normal saline and will continue till order expires  Patient be monitored for syncope though it appears most likely due to hypotension and being volume down  Further supportive care for nausea vomiting and diarrhea    Thank you for involving me in the care of the patient.  Will continue to follow    Electronically signed by Deanna Lange MD, 09/27/24, 8:35 AM EDT.         Electronically signed by Deanna Lange MD at 09/27/24 0865

## 2024-09-27 NOTE — CONSULTS
SW provided pt with ACP pamphlet to review. Encouraged her to reach out if she wishes to complete an AD. Pt v/u.

## 2024-09-27 NOTE — PLAN OF CARE
Goal Outcome Evaluation:  Plan of Care Reviewed With: patient           Outcome Evaluation: Pt presents with decreased balance and coordination when standing and walking requiring external assist for safety due to inc fall risk. Pt would benefit from skilled PT services to address this deficit. Pt would benefit from discharge home s/p hospitalization.      Anticipated Discharge Disposition (PT): home

## 2024-09-27 NOTE — PLAN OF CARE
Goal Outcome Evaluation:  Plan of Care Reviewed With: patient           Outcome Evaluation: Patient new admit to 4nt from ED this shift. A&ox4. Pt's son at bedside supportive of patient. C/o abdominal pain, nausea, heartburn, and dizziness upon standing; see MAR. Continuous fluids initiated. Blood glucose monitored. Urine specimen collected.

## 2024-09-27 NOTE — PLAN OF CARE
Goal Outcome Evaluation:  Plan of Care Reviewed With: patient        Progress: improving  Outcome Evaluation: Alert and oriented x4. No complaints of pain. Continuous fluids infusing per mar. Hypotensive this shift, MD aware, boluses given per mar. Blood glucose monitored per order, insulin given per mar. Bed alarm refused, educated on importance.

## 2024-09-27 NOTE — PROGRESS NOTES
"Daily Note     Today's date: 2024  Patient name: Roxana Ventura  : 1962  MRN: [id number]  Referring provider: Henry Pace  Dx:   Encounter Diagnosis     ICD-10-CM    1. Lumbar radiculopathy  M54.16           Start Time: 1100  Stop Time: 1145  Total time in clinic (min): 45 minutes    Subjective: Patient reports back as \"okay\" at start of session. She reports no complaints after previous session.       Objective: See treatment diary below      Assessment: Tolerated treatment well. Patient demonstrated fatigue post treatment, exhibited good technique with therapeutic exercises, and would benefit from continued PT      Plan: Continue per plan of care.  Progress treatment as tolerated.       Precautions: B TKA      Manuals 6/10 6/17           STM                                                    Neuro Re-Ed 6/10            bridges 5''x20 5''x20           clamshells GTB 2x10  GTB 3x10           sidesteps 4 laps  5 laps table           Mini squats  X10  2x10                                                  Ther Ex 6/10            Hip abd/ext Standing 2x10 ea Standing 3x10 ea           LTR 5''x20 5''x20           SKTC 10''x10 10''x10           LAQ 2x10 ea 3x10 ea            Bike  5min L 1 5' lvl 1           Tra w/ SLR 2x10 R, x10 R 2x10 ea            Tra w/ hip add tl 5''x20 5''x20           Tra with hip abd 5''x20 GTB 5''x20 GTB           Ther Activity                                       Gait Training                                       Modalities                                              " Reviewed H&P, agree with assessment and plan.    54-year-old female with history of diabetes, hypertension, A-fib on long-term anticoagulation Eliquis, anxiety, hypothyroidism, obesity coming GERD without esophagitis, passed out, syncope and collapse, found to be dehydrated, volume depleted in the setting of ongoing diarrhea, SUSANNA, nephrology consulted, placed on IV fluids, urinalysis suggesting UTI, pending culture, low TSH, high free T4, on levothyroxine, will need dose adjustment    Clinical course to dictate further management, discussed with nurse at bedside    Electronically signed by Robert Salazar MD, 9/27/2024, 10:55 EDT.    Portions of this documentation were transcribed electronically from a voice recognition software.  I confirm all data accurately represents the service(s) I performed at today's visit.

## 2024-09-27 NOTE — TELEPHONE ENCOUNTER
----- Message from Tatiana Tovar sent at 9/27/2024 11:48 AM EDT -----  There is a perfusion change to a very small portion of the heart wall suggestive of limited flow.  Please schedule an office follow-up to discuss results and consider medication adjustment versus further testing.

## 2024-09-28 ENCOUNTER — APPOINTMENT (OUTPATIENT)
Dept: GENERAL RADIOLOGY | Facility: HOSPITAL | Age: 54
DRG: 683 | End: 2024-09-28
Payer: COMMERCIAL

## 2024-09-28 PROBLEM — K52.9 GASTROENTERITIS: Status: ACTIVE | Noted: 2024-09-28

## 2024-09-28 LAB
ALBUMIN SERPL-MCNC: 2.7 G/DL (ref 3.5–5.2)
ALP SERPL-CCNC: 74 U/L (ref 39–117)
ALT SERPL W P-5'-P-CCNC: 17 U/L (ref 1–33)
ANION GAP SERPL CALCULATED.3IONS-SCNC: 7.8 MMOL/L (ref 5–15)
AST SERPL-CCNC: 14 U/L (ref 1–32)
BASOPHILS # BLD AUTO: 0.05 10*3/MM3 (ref 0–0.2)
BASOPHILS NFR BLD AUTO: 0.7 % (ref 0–1.5)
BILIRUB CONJ SERPL-MCNC: 0.2 MG/DL (ref 0–0.3)
BILIRUB INDIRECT SERPL-MCNC: 0.3 MG/DL
BILIRUB SERPL-MCNC: 0.5 MG/DL (ref 0–1.2)
BUN SERPL-MCNC: 47 MG/DL (ref 6–20)
BUN/CREAT SERPL: 21.8 (ref 7–25)
CALCIUM SPEC-SCNC: 8.3 MG/DL (ref 8.6–10.5)
CHLORIDE SERPL-SCNC: 109 MMOL/L (ref 98–107)
CO2 SERPL-SCNC: 24.2 MMOL/L (ref 22–29)
CREAT SERPL-MCNC: 2.16 MG/DL (ref 0.57–1)
DEPRECATED RDW RBC AUTO: 46.5 FL (ref 37–54)
EGFRCR SERPLBLD CKD-EPI 2021: 26.5 ML/MIN/1.73
EOSINOPHIL # BLD AUTO: 0.37 10*3/MM3 (ref 0–0.4)
EOSINOPHIL NFR BLD AUTO: 5.2 % (ref 0.3–6.2)
ERYTHROCYTE [DISTWIDTH] IN BLOOD BY AUTOMATED COUNT: 13.6 % (ref 12.3–15.4)
GLUCOSE BLDC GLUCOMTR-MCNC: 105 MG/DL (ref 70–99)
GLUCOSE BLDC GLUCOMTR-MCNC: 167 MG/DL (ref 70–99)
GLUCOSE BLDC GLUCOMTR-MCNC: 178 MG/DL (ref 70–99)
GLUCOSE BLDC GLUCOMTR-MCNC: 232 MG/DL (ref 70–99)
GLUCOSE SERPL-MCNC: 109 MG/DL (ref 65–99)
HCT VFR BLD AUTO: 34.3 % (ref 34–46.6)
HGB BLD-MCNC: 11.3 G/DL (ref 12–15.9)
IMM GRANULOCYTES # BLD AUTO: 0.06 10*3/MM3 (ref 0–0.05)
IMM GRANULOCYTES NFR BLD AUTO: 0.8 % (ref 0–0.5)
LYMPHOCYTES # BLD AUTO: 2.54 10*3/MM3 (ref 0.7–3.1)
LYMPHOCYTES NFR BLD AUTO: 35.4 % (ref 19.6–45.3)
MAGNESIUM SERPL-MCNC: 2 MG/DL (ref 1.6–2.6)
MCH RBC QN AUTO: 30.5 PG (ref 26.6–33)
MCHC RBC AUTO-ENTMCNC: 32.9 G/DL (ref 31.5–35.7)
MCV RBC AUTO: 92.7 FL (ref 79–97)
MONOCYTES # BLD AUTO: 0.58 10*3/MM3 (ref 0.1–0.9)
MONOCYTES NFR BLD AUTO: 8.1 % (ref 5–12)
NEUTROPHILS NFR BLD AUTO: 3.57 10*3/MM3 (ref 1.7–7)
NEUTROPHILS NFR BLD AUTO: 49.8 % (ref 42.7–76)
NRBC BLD AUTO-RTO: 0 /100 WBC (ref 0–0.2)
PHOSPHATE SERPL-MCNC: 3.4 MG/DL (ref 2.5–4.5)
PLATELET # BLD AUTO: 197 10*3/MM3 (ref 140–450)
PMV BLD AUTO: 9.6 FL (ref 6–12)
POTASSIUM SERPL-SCNC: 4.4 MMOL/L (ref 3.5–5.2)
PROT SERPL-MCNC: 5.3 G/DL (ref 6–8.5)
RBC # BLD AUTO: 3.7 10*6/MM3 (ref 3.77–5.28)
SODIUM SERPL-SCNC: 141 MMOL/L (ref 136–145)
WBC NRBC COR # BLD AUTO: 7.17 10*3/MM3 (ref 3.4–10.8)

## 2024-09-28 PROCEDURE — 82948 REAGENT STRIP/BLOOD GLUCOSE: CPT | Performed by: FAMILY MEDICINE

## 2024-09-28 PROCEDURE — 80048 BASIC METABOLIC PNL TOTAL CA: CPT | Performed by: FAMILY MEDICINE

## 2024-09-28 PROCEDURE — 25810000003 LACTATED RINGERS PER 1000 ML: Performed by: FAMILY MEDICINE

## 2024-09-28 PROCEDURE — 63710000001 INSULIN GLARGINE PER 5 UNITS: Performed by: FAMILY MEDICINE

## 2024-09-28 PROCEDURE — 80076 HEPATIC FUNCTION PANEL: CPT | Performed by: FAMILY MEDICINE

## 2024-09-28 PROCEDURE — 99232 SBSQ HOSP IP/OBS MODERATE 35: CPT | Performed by: FAMILY MEDICINE

## 2024-09-28 PROCEDURE — 85025 COMPLETE CBC W/AUTO DIFF WBC: CPT | Performed by: FAMILY MEDICINE

## 2024-09-28 PROCEDURE — 25810000003 LACTATED RINGERS PER 1000 ML: Performed by: INTERNAL MEDICINE

## 2024-09-28 PROCEDURE — 71045 X-RAY EXAM CHEST 1 VIEW: CPT

## 2024-09-28 PROCEDURE — 82948 REAGENT STRIP/BLOOD GLUCOSE: CPT

## 2024-09-28 PROCEDURE — 83735 ASSAY OF MAGNESIUM: CPT | Performed by: FAMILY MEDICINE

## 2024-09-28 PROCEDURE — 63710000001 INSULIN LISPRO (HUMAN) PER 5 UNITS: Performed by: FAMILY MEDICINE

## 2024-09-28 PROCEDURE — 84100 ASSAY OF PHOSPHORUS: CPT | Performed by: FAMILY MEDICINE

## 2024-09-28 RX ORDER — HYDROXYZINE HYDROCHLORIDE 25 MG/1
25 TABLET, FILM COATED ORAL 3 TIMES DAILY PRN
Status: DISCONTINUED | OUTPATIENT
Start: 2024-09-28 | End: 2024-09-29 | Stop reason: HOSPADM

## 2024-09-28 RX ORDER — MIDODRINE HYDROCHLORIDE 5 MG/1
5 TABLET ORAL
Status: DISCONTINUED | OUTPATIENT
Start: 2024-09-28 | End: 2024-09-29

## 2024-09-28 RX ORDER — ROPINIROLE 0.25 MG/1
0.25 TABLET, FILM COATED ORAL NIGHTLY
Status: DISCONTINUED | OUTPATIENT
Start: 2024-09-29 | End: 2024-09-29 | Stop reason: HOSPADM

## 2024-09-28 RX ORDER — LEVOTHYROXINE SODIUM 100 UG/1
100 TABLET ORAL DAILY
Status: DISCONTINUED | OUTPATIENT
Start: 2024-09-28 | End: 2024-09-29 | Stop reason: HOSPADM

## 2024-09-28 RX ORDER — DIPHENOXYLATE HCL/ATROPINE 2.5-.025MG
2 TABLET ORAL 4 TIMES DAILY PRN
Status: DISCONTINUED | OUTPATIENT
Start: 2024-09-28 | End: 2024-09-29 | Stop reason: HOSPADM

## 2024-09-28 RX ORDER — LIOTHYRONINE SODIUM 5 UG/1
5 TABLET ORAL 2 TIMES DAILY
Status: DISCONTINUED | OUTPATIENT
Start: 2024-09-28 | End: 2024-09-29 | Stop reason: HOSPADM

## 2024-09-28 RX ADMIN — INSULIN GLARGINE 25 UNITS: 100 INJECTION, SOLUTION SUBCUTANEOUS at 22:20

## 2024-09-28 RX ADMIN — MIDODRINE HYDROCHLORIDE 5 MG: 5 TABLET ORAL at 10:47

## 2024-09-28 RX ADMIN — GABAPENTIN 300 MG: 300 CAPSULE ORAL at 22:06

## 2024-09-28 RX ADMIN — GABAPENTIN 300 MG: 300 CAPSULE ORAL at 17:00

## 2024-09-28 RX ADMIN — INSULIN LISPRO 5 UNITS: 100 INJECTION, SOLUTION INTRAVENOUS; SUBCUTANEOUS at 17:00

## 2024-09-28 RX ADMIN — PANTOPRAZOLE SODIUM 40 MG: 40 INJECTION, POWDER, FOR SOLUTION INTRAVENOUS at 05:13

## 2024-09-28 RX ADMIN — APIXABAN 5 MG: 5 TABLET, FILM COATED ORAL at 08:29

## 2024-09-28 RX ADMIN — SODIUM CHLORIDE, POTASSIUM CHLORIDE, SODIUM LACTATE AND CALCIUM CHLORIDE 100 ML/HR: 600; 310; 30; 20 INJECTION, SOLUTION INTRAVENOUS at 17:00

## 2024-09-28 RX ADMIN — GABAPENTIN 300 MG: 300 CAPSULE ORAL at 08:29

## 2024-09-28 RX ADMIN — Medication 10 ML: at 08:30

## 2024-09-28 RX ADMIN — INSULIN LISPRO 3 UNITS: 100 INJECTION, SOLUTION INTRAVENOUS; SUBCUTANEOUS at 12:40

## 2024-09-28 RX ADMIN — MIDODRINE HYDROCHLORIDE 5 MG: 5 TABLET ORAL at 17:00

## 2024-09-28 RX ADMIN — DIPHENOXYLATE HYDROCHLORIDE AND ATROPINE SULFATE 2 TABLET: 2.5; .025 TABLET ORAL at 13:37

## 2024-09-28 RX ADMIN — INSULIN LISPRO 3 UNITS: 100 INJECTION, SOLUTION INTRAVENOUS; SUBCUTANEOUS at 22:21

## 2024-09-28 RX ADMIN — LEVOTHYROXINE SODIUM 100 MCG: 0.1 TABLET ORAL at 08:30

## 2024-09-28 RX ADMIN — SODIUM CHLORIDE, POTASSIUM CHLORIDE, SODIUM LACTATE AND CALCIUM CHLORIDE 100 ML/HR: 600; 310; 30; 20 INJECTION, SOLUTION INTRAVENOUS at 02:30

## 2024-09-28 RX ADMIN — LIOTHYRONINE SODIUM 5 MCG: 5 TABLET ORAL at 08:42

## 2024-09-28 RX ADMIN — MIDODRINE HYDROCHLORIDE 5 MG: 5 TABLET ORAL at 08:29

## 2024-09-28 RX ADMIN — LIOTHYRONINE SODIUM 5 MCG: 5 TABLET ORAL at 22:05

## 2024-09-28 RX ADMIN — SERTRALINE HYDROCHLORIDE 75 MG: 50 TABLET ORAL at 08:29

## 2024-09-28 RX ADMIN — APIXABAN 5 MG: 5 TABLET, FILM COATED ORAL at 22:06

## 2024-09-28 NOTE — PROGRESS NOTES
[facility]   Hospitalist Progress Note  Date: 2024  Patient Name: Carmelina Saldana  : 1970  MRN: [id number]  Date of admission: 2024      Subjective   Subjective       Chief complaint: Syncope and collapse    Summary:  54-year-old female with history of diabetes, hypertension, A-fib on long-term anticoagulation Eliquis, anxiety, hypothyroidism, obesity coming GERD without esophagitis, passed out, syncope and collapse, found to be dehydrated, volume depleted in the setting of ongoing diarrhea, SUSANNA, nephrology consulted, placed on IV fluids, urinalysis suggesting UTI, pending culture, low TSH, high free T4, on levothyroxine, dose adjusted and levothyroxine dose reduced.  Midodrine added with ongoing hypotensive episodes and symptomatic dizziness.  Enteric panel is pending.    Examined this morning, no acute distress, still symptomatic with dizziness and lightheadedness when standing and moving, blood pressures remain soft 80s over 40s, 90s over 50s.  Starting to get swelling in her left arm which her IV is placed, has no issues with urination, continues to have significant diarrhea.  Enteric panels are pending for stool.  Chest x-ray reviewed, no signs of infiltrates or volume overload.  Nephrology continuing IV fluids.  Blood sugars adequately controlled.  No fevers, chills, sweats.    Review of systems:  All systems reviewed and negative except for diarrhea, weakness, fatigue, dizziness    Objective   Objective     Vitals:   Temp:  [97.4 °F (36.3 °C)-98.2 °F (36.8 °C)] 97.7 °F (36.5 °C)  Heart Rate:  [76-83] 80  Resp:  [16-20] 18  BP: (76-96)/(48-59) 94/54  Physical Exam    Constitutional: Awake, alert, no acute distress   Eyes: Pupils equal, sclerae anicteric, no conjunctival injection   HENT: NCAT, mucous membranes moist   Neck: Supple, no thyromegaly, no lymphadenopathy, trachea midline   Respiratory: Clear to auscultation bilaterally, nonlabored respirations    Cardiovascular: RRR, no  murmurs, rubs, or gallops, palpable pedal pulses bilaterally   Gastrointestinal: Positive bowel sounds, soft, nontender, nondistended   Musculoskeletal: Trace bilateral ankle edema, positive edema left arm, no clubbing or cyanosis to extremities   Psychiatric: Appropriate affect, cooperative   Neurologic: Oriented x 3, strength symmetric in all extremities, Cranial Nerves grossly intact to confrontation, speech clear   Skin: No rashes   Result Review    Result Review:  I have personally reviewed the pertinent results from the past 24 hours to 9/28/2024 10:55 EDT and agree with these findings:  [x]  Laboratory   CBC          8/24/2024    23:41 9/27/2024    02:42 9/28/2024    05:12   CBC   WBC 11.36  11.84  7.17    RBC 4.73  4.73  3.70    Hemoglobin 14.3  14.5  11.3    Hematocrit 42.6  42.6  34.3    MCV 90.1  90.1  92.7    MCH 30.2  30.7  30.5    MCHC 33.6  34.0  32.9    RDW 14.1  13.5  13.6    Platelets 332  298  197      BMP          8/24/2024    23:41 9/27/2024    02:42 9/28/2024    05:12   BMP   BUN 17  68  47    Creatinine 1.21  4.81  2.16    Sodium 136  133  141    Potassium 3.3  4.9  4.4    Chloride 97  94  109    CO2 26.4  21.5  24.2    Calcium 9.3  9.4  8.3      LIVER FUNCTION TESTS:      Lab 09/28/24  0512 09/27/24  0242   TOTAL PROTEIN 5.3* 7.0   ALBUMIN 2.7* 3.6   GLOBULIN  --  3.4   ALT (SGPT) 17 26   AST (SGOT) 14 13   BILIRUBIN 0.5 0.7   INDIRECT BILIRUBIN 0.3  --    BILIRUBIN DIRECT 0.2  --    ALK PHOS 74 105       [x]  Microbiology   Microbiology Results (last 10 days)       Procedure Component Value - Date/Time    Clostridioides difficile Toxin - Stool, Per Rectum [[id number]] Collected: 09/27/24 1124    Lab Status: Final result Specimen: Stool from Per Rectum Updated: 09/27/24 1238    Narrative:      The following orders were created for panel order Clostridioides difficile Toxin - Stool, Per Rectum.  Procedure                               Abnormality         Status                     ---------                                -----------         ------                     Clostridioides difficile...[[id number]]                      Final result                 Please view results for these tests on the individual orders.    Clostridioides difficile Toxin, PCR - Stool, Per Rectum [[id number]] Collected: 09/27/24 1124    Lab Status: Final result Specimen: Stool from Per Rectum Updated: 09/27/24 1238     Toxigenic C. difficile by PCR Negative     027 Toxin Presumptive Negative    Narrative:      The result indicates the absence of toxigenic C. difficile from stool specimen.               [x]  Radiology XR Chest 1 View    Result Date: 9/28/2024  Impression: No acute cardiopulmonary disease Electronically Signed: Payam Burrell MD  9/28/2024 9:20 AM EDT  Workstation ID: [id number]    US Renal Bilateral    Result Date: 9/27/2024  Impression: Unremarkable renal ultrasound. Electronically Signed: Razia Ford MD  9/27/2024 7:45 AM EDT  Workstation ID: [id number]       [x]  EKG/Telemetry   ECG 12 Lead ED Triage Standing Order; Syncope   Final Result   HEART RATE=90  bpm   RR Pbvxoefm=407  ms   CT Iyihxobl=648  ms   P Horizontal Axis=13  deg   P Front Axis=16  deg   QRSD Interval=93  ms   QT Womqcawz=798  ms   QWgU=802  ms   QRS Axis=-62  deg   T Wave Axis=24  deg   - ABNORMAL ECG -   Sinus rhythm   Left anterior fascicular block   Electronically Signed By: Geovanny David ([facility]) 2024-09-27 09:40:26   Date and Time of Study:2024-09-27 03:56:44          []  Cardiology/Vascular   []  Pathology  [x]  Old records  []  Other:    Assessment & Plan   Assessment / Plan     Assessment/Plan:    Assessment:  SUSANNA  Volume depletion  Syncope and collapse  Acute diarrhea  Paroxysmal atrial fibrillation  Long-term anticoagulation with Eliquis  History of PE  Dyslipidemia  Anxiety  Hypothyroidism  Diabetes mellitus with diabetic retinopathy and neuropathy    Plan:  Labs and imaging reviewed  LR IV fluids continued at 100 cc/h  Nephrology  consultation appreciated discussed with Dr. Tovar, still needs IV fluids  Advance diet to carb consistent  Levothyroxine dose reduced to 100 mcg daily  Will need repeat TSH and free T4 in 4 to 6 weeks  Added midodrine 5 mg 3 times daily temporarily to increase blood pressures  Holding antihypertensive medications  Continue Zoloft 75 mg daily  Elevate left arm  Change IV site  Continue Lantus 25 units nightly in addition to insulin sliding cell coverage  Continue Eliquis 5 mg p.o. twice daily  Continue Neurontin 300 mg 3 times a day  Follow-up enteric stool panels  Zofran for nausea  A.m. labs  Full code  DVT prophylax with Eliquis  Clinical course dictate further management  Discussed with nurse    VTE Prophylaxis:  Pharmacologic VTE prophylaxis orders are present.        CODE STATUS:   Level Of Support Discussed With: Patient  Code Status (Patient has no pulse and is not breathing): CPR (Attempt to Resuscitate)  Medical Interventions (Patient has pulse or is breathing): Full Support        Electronically signed by Robert Salazar MD, 9/28/2024, 10:55 EDT.    Portions of this documentation were transcribed electronically from a voice recognition software.  I confirm all data accurately represents the service(s) I performed at today's visit.

## 2024-09-28 NOTE — PROGRESS NOTES
[facility]     Progress Note    Patient Name: Carmelina Saldana  : 1970  MRN: [id number]  Primary Care Physician:  Guy Beatty MD  Date of admission: 2024    Subjective patient is complaining of severe dizziness when she stands up and is having still severe diarrhea  Renal function much better    Review of Systems  All review of systems are negative except as mentioned in subjective complaints.    Objective   Objective     Vitals:   Temp:  [97.4 °F (36.3 °C)-98.2 °F (36.8 °C)] 97.7 °F (36.5 °C)  Heart Rate:  [76-83] 80  Resp:  [16-20] 18  BP: (76-96)/(48-59) 94/54  Physical Exam    Constitutional: Awake, alert responsive, conversant, no obvious distress              Psychiatric:  Appropriate affect, cooperative   Neurologic:  Awake alert ,oriented x 3, strength symmetric in all extremities, Cranial Nerves grossly intact to confrontation, speech clear   Eyes:   PERRLA, sclerae anicteric, no conjunctival injection   HEENT:  Moist mucous membranes, no nasal or eye discharge, no throat congestion   Neck:   Supple, no thyromegaly, no lymphadenopathy, trachea midline, no elevated JVD   Respiratory:  Clear to auscultation bilaterally, nonlabored respirations    Cardiovascular: RRR, no murmurs, rubs, or gallops, palpable pedal pulses bilaterally, No bilateral ankle edema   Gastrointestinal: Positive bowel sounds, soft, nontender, nondistended, no organomegaly   Musculoskeletal:  No clubbing or cyanosis to extremities,muscle wasting, joint swelling, muscle weakness             Skin:                      No rashes, bruising, skin ulcers, petechiae or ecchymosis    Result Review    Result Review:  I have personally reviewed the results from the time of this admission to 2024 09:44 EDT and agree with these findings:  []  Laboratory  []  Microbiology  []  Radiology  []  EKG/Telemetry   []  Cardiology/Vascular   []  Pathology  []  Old records  []  Other:    Results from last 7 days   Lab Units  09/28/24  0512 09/27/24  0242   WBC 10*3/mm3 7.17 11.84*   HEMOGLOBIN g/dL 11.3* 14.5   PLATELETS 10*3/mm3 197 298     Results from last 7 days   Lab Units 09/28/24  0512 09/27/24  0242   SODIUM mmol/L 141 133*   POTASSIUM mmol/L 4.4 4.9   CHLORIDE mmol/L 109* 94*   CO2 mmol/L 24.2 21.5*   ANION GAP mmol/L 7.8 17.5*   BUN mg/dL 47* 68*   CREATININE mg/dL 2.16* 4.81*   GLUCOSE mg/dL 109* 349*       Assessment & Plan   Assessment / Plan       Active Hospital Problems:    Active Hospital Problems    Diagnosis  POA    **Acute renal failure [N17.9]  Yes     Secondary to dehydration from gastroenteritis      Gastroenteritis [K52.9]  Unknown     Most likely viral  EDF negative      Syncope [R55]  Unknown    Diarrhea [R19.7]  Unknown    Paroxysmal A-fib [I48.0]  Yes    Type 2 diabetes mellitus with diabetic neuropathy, with long-term current use of insulin [E11.40, Z79.4]  Not Applicable    History of pulmonary embolus (PE) [Z86.711]  Yes    Class 3 severe obesity without serious comorbidity with body mass index (BMI) of 40.0 to 44.9 in adult [E66.01, Z68.41]  Not Applicable     SUSANNA improving  Plan:   Try Lomotil as needed  Continue lactated Ringer as patient is still hypotensive and feeling dizzy  Avoid any antihypertensive medications at this time       Electronically signed by Sakshi Tovar MD, 09/28/24, 9:42 AM EDT.

## 2024-09-28 NOTE — PLAN OF CARE
Goal Outcome Evaluation:  Plan of Care Reviewed With: patient        Progress: improving  Outcome Evaluation: Patient oriented x4; lethargic this shift but arouses easily. Blood glucose monitored. Continuous IV fluids continued. Up with standby assist to bathroom.

## 2024-09-28 NOTE — PLAN OF CARE
Goal Outcome Evaluation:  Plan of Care Reviewed With: patient        Progress: improving  Outcome Evaluation: Alert and oriented x4. No complaints of pain. Blood glucose monitored per order. Continuous fluids infusing per mar. Up to shower this shift. Up in chair and ambulating frequently. Bed/chair alarm refused.

## 2024-09-29 ENCOUNTER — READMISSION MANAGEMENT (OUTPATIENT)
Dept: CALL CENTER | Facility: HOSPITAL | Age: 54
End: 2024-09-29
Payer: COMMERCIAL

## 2024-09-29 VITALS
WEIGHT: 218.26 LBS | HEART RATE: 86 BPM | DIASTOLIC BLOOD PRESSURE: 67 MMHG | RESPIRATION RATE: 18 BRPM | TEMPERATURE: 97.9 F | HEIGHT: 64 IN | OXYGEN SATURATION: 99 % | SYSTOLIC BLOOD PRESSURE: 138 MMHG | BODY MASS INDEX: 37.26 KG/M2

## 2024-09-29 PROBLEM — R55 SYNCOPE: Status: RESOLVED | Noted: 2024-09-27 | Resolved: 2024-09-29

## 2024-09-29 PROBLEM — K52.9 GASTROENTERITIS: Status: RESOLVED | Noted: 2024-09-28 | Resolved: 2024-09-29

## 2024-09-29 PROBLEM — E11.9 DM (DIABETES MELLITUS): Status: ACTIVE | Noted: 2019-11-17

## 2024-09-29 PROBLEM — B96.20 E. COLI UTI (URINARY TRACT INFECTION): Status: ACTIVE | Noted: 2024-09-29

## 2024-09-29 PROBLEM — R19.7 DIARRHEA: Status: RESOLVED | Noted: 2024-09-27 | Resolved: 2024-09-29

## 2024-09-29 PROBLEM — N39.0 E. COLI UTI (URINARY TRACT INFECTION): Status: ACTIVE | Noted: 2024-09-29

## 2024-09-29 PROBLEM — Z86.718 HISTORY OF DVT (DEEP VEIN THROMBOSIS): Status: ACTIVE | Noted: 2019-11-17

## 2024-09-29 PROBLEM — I26.99 BILATERAL PULMONARY EMBOLISM: Status: ACTIVE | Noted: 2019-11-17

## 2024-09-29 LAB
ALBUMIN SERPL-MCNC: 2.9 G/DL (ref 3.5–5.2)
ALP SERPL-CCNC: 74 U/L (ref 39–117)
ALT SERPL W P-5'-P-CCNC: 16 U/L (ref 1–33)
ANION GAP SERPL CALCULATED.3IONS-SCNC: 7.7 MMOL/L (ref 5–15)
AST SERPL-CCNC: 14 U/L (ref 1–32)
BACTERIA SPEC AEROBE CULT: ABNORMAL
BASOPHILS # BLD AUTO: 0.07 10*3/MM3 (ref 0–0.2)
BASOPHILS NFR BLD AUTO: 0.9 % (ref 0–1.5)
BILIRUB CONJ SERPL-MCNC: <0.2 MG/DL (ref 0–0.3)
BILIRUB INDIRECT SERPL-MCNC: ABNORMAL MG/DL
BILIRUB SERPL-MCNC: 0.2 MG/DL (ref 0–1.2)
BUN SERPL-MCNC: 32 MG/DL (ref 6–20)
BUN/CREAT SERPL: 24.4 (ref 7–25)
C COLI+JEJ+UPSA DNA STL QL NAA+NON-PROBE: NOT DETECTED
CALCIUM SPEC-SCNC: 8.6 MG/DL (ref 8.6–10.5)
CHLORIDE SERPL-SCNC: 108 MMOL/L (ref 98–107)
CO2 SERPL-SCNC: 25.3 MMOL/L (ref 22–29)
CREAT SERPL-MCNC: 1.31 MG/DL (ref 0.57–1)
CRYPTOSP DNA STL QL NAA+NON-PROBE: NOT DETECTED
DEPRECATED RDW RBC AUTO: 45.9 FL (ref 37–54)
E HISTOLYT DNA STL QL NAA+NON-PROBE: NOT DETECTED
EC STX1+STX2 GENES STL QL NAA+NON-PROBE: NOT DETECTED
EGFRCR SERPLBLD CKD-EPI 2021: 48.2 ML/MIN/1.73
EOSINOPHIL # BLD AUTO: 0.38 10*3/MM3 (ref 0–0.4)
EOSINOPHIL NFR BLD AUTO: 5.1 % (ref 0.3–6.2)
ERYTHROCYTE [DISTWIDTH] IN BLOOD BY AUTOMATED COUNT: 13.6 % (ref 12.3–15.4)
G LAMBLIA DNA STL QL NAA+NON-PROBE: NOT DETECTED
GLUCOSE BLDC GLUCOMTR-MCNC: 92 MG/DL (ref 70–99)
GLUCOSE SERPL-MCNC: 119 MG/DL (ref 65–99)
HCT VFR BLD AUTO: 33.2 % (ref 34–46.6)
HGB BLD-MCNC: 10.9 G/DL (ref 12–15.9)
IMM GRANULOCYTES # BLD AUTO: 0.05 10*3/MM3 (ref 0–0.05)
IMM GRANULOCYTES NFR BLD AUTO: 0.7 % (ref 0–0.5)
LYMPHOCYTES # BLD AUTO: 3.3 10*3/MM3 (ref 0.7–3.1)
LYMPHOCYTES NFR BLD AUTO: 43.9 % (ref 19.6–45.3)
MAGNESIUM SERPL-MCNC: 1.8 MG/DL (ref 1.6–2.6)
MCH RBC QN AUTO: 30.3 PG (ref 26.6–33)
MCHC RBC AUTO-ENTMCNC: 32.8 G/DL (ref 31.5–35.7)
MCV RBC AUTO: 92.2 FL (ref 79–97)
MONOCYTES # BLD AUTO: 0.6 10*3/MM3 (ref 0.1–0.9)
MONOCYTES NFR BLD AUTO: 8 % (ref 5–12)
NEUTROPHILS NFR BLD AUTO: 3.12 10*3/MM3 (ref 1.7–7)
NEUTROPHILS NFR BLD AUTO: 41.4 % (ref 42.7–76)
NRBC BLD AUTO-RTO: 0 /100 WBC (ref 0–0.2)
PHOSPHATE SERPL-MCNC: 2.7 MG/DL (ref 2.5–4.5)
PLATELET # BLD AUTO: 218 10*3/MM3 (ref 140–450)
PMV BLD AUTO: 9.8 FL (ref 6–12)
POTASSIUM SERPL-SCNC: 4.1 MMOL/L (ref 3.5–5.2)
PROT SERPL-MCNC: 5.4 G/DL (ref 6–8.5)
RBC # BLD AUTO: 3.6 10*6/MM3 (ref 3.77–5.28)
S ENT+BONG DNA STL QL NAA+NON-PROBE: NOT DETECTED
SHIGELLA SP+EIEC IPAH ST NAA+NON-PROBE: NOT DETECTED
SODIUM SERPL-SCNC: 141 MMOL/L (ref 136–145)
WBC NRBC COR # BLD AUTO: 7.52 10*3/MM3 (ref 3.4–10.8)

## 2024-09-29 PROCEDURE — 83735 ASSAY OF MAGNESIUM: CPT | Performed by: FAMILY MEDICINE

## 2024-09-29 PROCEDURE — 97116 GAIT TRAINING THERAPY: CPT

## 2024-09-29 PROCEDURE — 97110 THERAPEUTIC EXERCISES: CPT

## 2024-09-29 PROCEDURE — 82948 REAGENT STRIP/BLOOD GLUCOSE: CPT | Performed by: FAMILY MEDICINE

## 2024-09-29 PROCEDURE — 80076 HEPATIC FUNCTION PANEL: CPT | Performed by: FAMILY MEDICINE

## 2024-09-29 PROCEDURE — 97530 THERAPEUTIC ACTIVITIES: CPT

## 2024-09-29 PROCEDURE — 85025 COMPLETE CBC W/AUTO DIFF WBC: CPT | Performed by: FAMILY MEDICINE

## 2024-09-29 PROCEDURE — 25810000003 LACTATED RINGERS PER 1000 ML: Performed by: INTERNAL MEDICINE

## 2024-09-29 PROCEDURE — 25010000002 CEFTRIAXONE PER 250 MG: Performed by: FAMILY MEDICINE

## 2024-09-29 PROCEDURE — 80048 BASIC METABOLIC PNL TOTAL CA: CPT | Performed by: FAMILY MEDICINE

## 2024-09-29 PROCEDURE — 84100 ASSAY OF PHOSPHORUS: CPT | Performed by: FAMILY MEDICINE

## 2024-09-29 PROCEDURE — 99239 HOSP IP/OBS DSCHRG MGMT >30: CPT | Performed by: FAMILY MEDICINE

## 2024-09-29 RX ORDER — SERTRALINE HYDROCHLORIDE 100 MG/1
100 TABLET, FILM COATED ORAL DAILY
Status: DISCONTINUED | OUTPATIENT
Start: 2024-09-30 | End: 2024-09-29 | Stop reason: HOSPADM

## 2024-09-29 RX ORDER — SERTRALINE HYDROCHLORIDE 100 MG/1
100 TABLET, FILM COATED ORAL DAILY
Qty: 30 TABLET | Refills: 0 | Status: SHIPPED | OUTPATIENT
Start: 2024-09-30 | End: 2024-10-30

## 2024-09-29 RX ORDER — LEVOTHYROXINE SODIUM 100 UG/1
100 TABLET ORAL DAILY
Qty: 30 TABLET | Refills: 0 | Status: SHIPPED | OUTPATIENT
Start: 2024-09-30 | End: 2024-10-30

## 2024-09-29 RX ORDER — MIRTAZAPINE 15 MG/1
15 TABLET, FILM COATED ORAL NIGHTLY
Status: DISCONTINUED | OUTPATIENT
Start: 2024-09-29 | End: 2024-09-29 | Stop reason: HOSPADM

## 2024-09-29 RX ORDER — MIRTAZAPINE 15 MG/1
15 TABLET, FILM COATED ORAL NIGHTLY
Qty: 30 TABLET | Refills: 0 | Status: SHIPPED | OUTPATIENT
Start: 2024-09-29 | End: 2024-10-29

## 2024-09-29 RX ORDER — CEPHALEXIN 500 MG/1
500 CAPSULE ORAL 3 TIMES DAILY
Qty: 15 CAPSULE | Refills: 0 | Status: SHIPPED | OUTPATIENT
Start: 2024-09-29 | End: 2024-10-04

## 2024-09-29 RX ADMIN — SODIUM CHLORIDE, POTASSIUM CHLORIDE, SODIUM LACTATE AND CALCIUM CHLORIDE 100 ML/HR: 600; 310; 30; 20 INJECTION, SOLUTION INTRAVENOUS at 03:30

## 2024-09-29 RX ADMIN — APIXABAN 5 MG: 5 TABLET, FILM COATED ORAL at 09:00

## 2024-09-29 RX ADMIN — ROPINIROLE HYDROCHLORIDE 0.25 MG: 0.25 TABLET, FILM COATED ORAL at 00:03

## 2024-09-29 RX ADMIN — LIOTHYRONINE SODIUM 5 MCG: 5 TABLET ORAL at 11:11

## 2024-09-29 RX ADMIN — PANTOPRAZOLE SODIUM 40 MG: 40 INJECTION, POWDER, FOR SOLUTION INTRAVENOUS at 06:13

## 2024-09-29 RX ADMIN — MIDODRINE HYDROCHLORIDE 5 MG: 5 TABLET ORAL at 09:00

## 2024-09-29 RX ADMIN — Medication 10 ML: at 09:00

## 2024-09-29 RX ADMIN — CEFTRIAXONE SODIUM 2000 MG: 2 INJECTION, POWDER, FOR SOLUTION INTRAMUSCULAR; INTRAVENOUS at 09:01

## 2024-09-29 RX ADMIN — LEVOTHYROXINE SODIUM 100 MCG: 0.1 TABLET ORAL at 09:00

## 2024-09-29 RX ADMIN — GABAPENTIN 300 MG: 300 CAPSULE ORAL at 09:00

## 2024-09-29 NOTE — CONSULTS
[facility]   PSYCHIATRIC CONSULTATION    Patient Name: Carmelina Saldana  : 1970  MRN: [id number]  Primary Care Physician:  Guy Beatty MD  Date of admission: 2024    Referring Provider: MURRAY Mo MD  Reason for Consultation: Depression    Subjective   Subjective     Chief Complaint: Depressed    HPI:     Carmelina Saldana is a 54 y.o. female with previous diagnosis of anxiety, depression, atrial fibrillation, hypertension, diabetes who is also on disability due to be legally blind was admitted after syncopal episode to the medicine service.  The psychiatry consult was issued due to reported severe depression.  This morning the patient was seen in his room, the patient was fully cooperative with assessment and presented is a pleasant and calm 54-year-old female.  The patient revealed that she developed symptoms of depression approximately 3 years ago after she she became medically compliant and was here ability to work.  The symptoms got significantly worse since her  suddenly passed away in January of this year.  Patient stated that they have been  for 35 years and now she did not know how she is currently without him.  The patient stated that she lost interest in any activities, stable mostly in human, for past several weeks she was not eating or drinking, lost approximately 10 pounds.  The patient reported having difficulties of staying asleep, waking up several times during the night and not feeling rested in the morning.  The patient also reported feeling very anxious, concerned about how she is going to pay her bills.  The patient denied having any active suicidal ideations with desire to die, she wanted to feel better.  The patient stated that she lives with her 30-year-old son and his girlfriend, described her son as as a supportive the patient also depend on her son since she cannot drive or go anywhere due to her blindness.  The patient stated that she has been  "taking Zoloft for a while, the medicine helped in the beginning but for past several weeks she did not feel like it is doing much for her.  The patient was on the same dose for approximately a year.    Review of Systems   All systems were reviewed and negative except for: Tired    Personal History     Past Medical History:   Diagnosis Date    Abnormal ECG Aug    Acute deep vein thrombosis (DVT) of right popliteal vein     Anemia     Anxiety     Arthritis     Atrial fibrillation     Bilateral pulmonary embolism     Hypertension     Legally blind     Neuropathy     some pains at night in the feet    Obesity     Pleuritic chest pain     Retinopathy     no retinopathy but does have \"blood behind the eyes\"; going for injections and laser treatment; she states her vision has improved some and she will be undergoing laser treatment    Thyroid disease     removed due to thyroid nodules     Type 2 diabetes mellitus        Past Surgical History:   Procedure Laterality Date    CATARACT EXTRACTION, BILATERAL Bilateral     08/16/2024 & 08/09/2024    CHOLECYSTECTOMY      EYE SURGERY Left 09/28/2022    HYSTERECTOMY      PARTIAL    THYROID SURGERY      US GUIDED FINE NEEDLE ASPIRATION  03/02/2020       Past Psychiatric History: Depression    Psychiatric Hospitalizations: None    Suicide Attempts: Denied    Prior Treatment and Medications Tried: Zoloft 75 mg for the past year        Family History: family history includes Asthma in her sister; Diabetes type II in her mother and another family member; Stroke in her father. Otherwise pertinent FHx was reviewed and not pertinent to current issue.    Social History:     Social History     Socioeconomic History    Marital status:     Number of children: 2   Tobacco Use    Smoking status: Never     Passive exposure: Never    Smokeless tobacco: Never   Vaping Use    Vaping status: Never Used   Substance and Sexual Activity    Alcohol use: Not Currently     Comment: OCCASIONAL/SOCIAL "    Drug use: Never    Sexual activity: Yes     Partners: Male     Birth control/protection: Hysterectomy       Substance Abuse History: reports that she has never smoked. She has never been exposed to tobacco smoke. She has never used smokeless tobacco. She reports that she does not currently use alcohol. She reports that she does not use drugs.    Home Medications:  DULoxetine, Insulin Aspart FlexPen, Insulin Glargine, Semaglutide (1 MG/DOSE), albuterol, albuterol sulfate HFA, apixaban, atorvastatin, benazepril, busPIRone, chlorthalidone, famotidine, gabapentin, hydrOXYzine, levothyroxine, liothyronine, metoprolol succinate XL, pantoprazole, rOPINIRole, and sertraline      Allergies:  No Known Allergies    Objective   Objective     Vitals:   Temp:  [97.6 °F (36.4 °C)-98.4 °F (36.9 °C)] 98.4 °F (36.9 °C)  Heart Rate:  [70-89] 89  Resp:  [18] 18  BP: (100-138)/(59-75) 121/60  Physical Exam       Mental Status Exam:     Hygiene:   good  Cooperation:  Cooperative  Eye Contact:  Good  Psychomotor Behavior:  Appropriate  Mood: Depressed  Affect:  Blunted  Speech:  Normal  Language: Appropriate  Thought Process:  Goal directed  Thought Content:  Mood congruent  Suicidal:  None  Homicidal:  None  Hallucinations:  None  Delusion:  None  Memory:  Intact  Orientation:  Person, Place, and Time  Reliability:  good  Insight:  Good  Judgement:  Fair  Impulse Control:  Good    Result Review    Result Review:  I have personally reviewed the results from the time of this admission to 9/29/2024 08:44 EDT and agree with these findings:  [x]  Laboratory  []  Microbiology  []  Radiology  []  EKG/Telemetry   []  Cardiology/Vascular   []  Pathology  []  Old records  []  Other:  Most notable findings include: Cr: 1.31,BUN:48    Assessment & Plan   Assessment / Plan     Brief Patient Summary:  Carmelina Saldana is a 54 y.o. female who was admitted due to syncopal episode, diagnosed with acute kidney failure most likely due to poor fluid  intake, was seen for symptoms of depression and anxiety.    Active Hospital Problems:  Active Hospital Problems    Diagnosis     **Acute renal failure     Gastroenteritis     Syncope     Diarrhea     Paroxysmal A-fib     Type 2 diabetes mellitus with diabetic neuropathy, with long-term current use of insulin     History of pulmonary embolus (PE)     Class 3 severe obesity without serious comorbidity with body mass index (BMI) of 40.0 to 44.9 in adult          Plan:   Supportive approach with focus on current stressors symptoms and history.  Recommended to go up on Zoloft to 100 mg daily and Remeron 15 mg at bedtime to target sleep and appetite.  The patient expressed interest to see a psychiatrist through telehealth due to her inability to drive. The patient will be definitely benefiting from also seeing counselor/therapist through telehealth.  The patient denied having any thoughts of suicide.  Will continue to follow.        Part of this note may be an electronic transcription/translation of spoken language to printed text using the Dragon Dictation System.    Electronically signed by Ashwini Thompson MD, 09/29/24, 8:44 AM EDT.

## 2024-09-29 NOTE — PROGRESS NOTES
[facility]     Progress Note    Patient Name: Carmelina Saldana  : 1970  MRN: [id number]  Primary Care Physician:  Guy Beatty MD  Date of admission: 2024    Subjective patient doing very well she is weak but not dizzy anymore renal function has significantly improved    Review of Systems  All review of systems are negative except as mentioned in subjective complaints.    Objective   Objective     Vitals:   Temp:  [97.6 °F (36.4 °C)-98.4 °F (36.9 °C)] 97.9 °F (36.6 °C)  Heart Rate:  [70-89] 86  Resp:  [18] 18  BP: (112-138)/(59-75) 138/67  Physical Exam    Constitutional: Awake, alert responsive, conversant, no obvious distress              Psychiatric:  Appropriate affect, cooperative   Neurologic:  Awake alert ,oriented x 3, strength symmetric in all extremities, Cranial Nerves grossly intact to confrontation, speech clear   Eyes:   PERRLA, sclerae anicteric, no conjunctival injection   HEENT:  Moist mucous membranes, no nasal or eye discharge, no throat congestion   Neck:   Supple, no thyromegaly, no lymphadenopathy, trachea midline, no elevated JVD   Respiratory:  Clear to auscultation bilaterally, nonlabored respirations    Cardiovascular: RRR, no murmurs, rubs, or gallops, palpable pedal pulses bilaterally, No bilateral ankle edema   Gastrointestinal: Positive bowel sounds, soft, nontender, nondistended, no organomegaly   Musculoskeletal:  No clubbing or cyanosis to extremities,muscle wasting, joint swelling, muscle weakness             Skin:                      No rashes, bruising, skin ulcers, petechiae or ecchymosis    Result Review    Result Review:  I have personally reviewed the results from the time of this admission to 2024 11:18 EDT and agree with these findings:  []  Laboratory  []  Microbiology  []  Radiology  []  EKG/Telemetry   []  Cardiology/Vascular   []  Pathology  []  Old records  []  Other:    Results from last 7 days   Lab Units 24  0513 24  0512  09/27/24  0242   WBC 10*3/mm3 7.52 7.17 11.84*   HEMOGLOBIN g/dL 10.9* 11.3* 14.5   PLATELETS 10*3/mm3 218 197 298     Results from last 7 days   Lab Units 09/29/24  0513 09/28/24  0512 09/27/24  0242   SODIUM mmol/L 141 141 133*   POTASSIUM mmol/L 4.1 4.4 4.9   CHLORIDE mmol/L 108* 109* 94*   CO2 mmol/L 25.3 24.2 21.5*   ANION GAP mmol/L 7.7 7.8 17.5*   BUN mg/dL 32* 47* 68*   CREATININE mg/dL 1.31* 2.16* 4.81*   GLUCOSE mg/dL 119* 109* 349*       Assessment & Plan   Assessment / Plan       Active Hospital Problems:    Active Hospital Problems    Diagnosis  POA    **Acute renal failure [N17.9]  Yes     Secondary to dehydration from gastroenteritis      E. coli UTI (urinary tract infection) [N39.0, B96.20]  Unknown    Paroxysmal A-fib [I48.0]  Yes    Type 2 diabetes mellitus with diabetic neuropathy, with long-term current use of insulin [E11.40, Z79.4]  Not Applicable    History of pulmonary embolus (PE) [Z86.711]  Yes    Class 3 severe obesity without serious comorbidity with body mass index (BMI) of 40.0 to 44.9 in adult [E66.01, Z68.41]  Not Applicable       Plan:   DC IV fluids  Patient can be discharged from renal perspective       Electronically signed by Sakshi Tovar MD, 09/29/24, 11:18 AM EDT.

## 2024-09-29 NOTE — PLAN OF CARE
Goal Outcome Evaluation:                   Patient remains alert and oriented x4. No complaints of pain throughout the shift. Discharge paperwork addressed, no questions or concerns at this time. Patient discharged home with son via personal vehicle.

## 2024-09-29 NOTE — DISCHARGE SUMMARY
[facility]         HOSPITALIST  DISCHARGE SUMMARY    Patient Name: Carmelina Saldana  : 1970  MRN: [id number]    Date of Admission: 2024  Date of Discharge:  2024    Primary Care Physician: Guy Beatty MD    Consults       Date and Time Order Name Status Description    2024 11:51 AM Inpatient Psychiatrist Consult      2024  6:04 AM Inpatient Nephrology Consult Completed     2024  3:44 AM Hospitalist (on-call MD unless specified)              Active and Resolved Hospital Problems:       SUSANNA  Volume depletion  Syncope and collapse  Acute diarrhea  Paroxysmal atrial fibrillation  Long-term anticoagulation with Eliquis  History of PE  Dyslipidemia  Anxiety  Hypothyroidism  Diabetes mellitus with diabetic retinopathy and neuropathy  Depression      Active Hospital Problems    Diagnosis POA   • **Acute renal failure [N17.9] Yes   • E. coli UTI (urinary tract infection) [N39.0, B96.20] Unknown   • Paroxysmal A-fib [I48.0] Yes   • Type 2 diabetes mellitus with diabetic neuropathy, with long-term current use of insulin [E11.40, Z79.4] Not Applicable   • History of pulmonary embolus (PE) [Z86.711] Yes   • Class 3 severe obesity without serious comorbidity with body mass index (BMI) of 40.0 to 44.9 in adult [E66.01, Z68.41] Not Applicable      Resolved Hospital Problems    Diagnosis POA   • Gastroenteritis [K52.9] Unknown   • Syncope [R55] Unknown   • Diarrhea [R19.7] Unknown       Hospital Course     Hospital Course:  54-year-old female with history of diabetes, hypertension, A-fib on long-term anticoagulation Eliquis, anxiety, hypothyroidism, obesity coming GERD without esophagitis, passed out, syncope and collapse, found to be dehydrated, volume depleted in the setting of ongoing diarrhea, SUSANNA, nephrology consulted, placed on IV fluids, urinalysis suggesting UTI, urine culture came back positive for pansensitive E. coli, low TSH, high free T4, on levothyroxine,  dose adjusted and levothyroxine dose reduced.  Midodrine added with ongoing hypotensive episodes and symptomatic dizziness.  Enteric panel obtained.  Seen by psychiatry for depression symptoms, Zoloft dose increased to 100, Remeron dose increased to 15 mg nightly, patient to follow-up with psychiatry as outpatient.  Patient's renal function improved with IV fluids, blood pressures improved, diarrhea resolved, as of 9/29/2024, hemodynamically stable at time of discharge, to follow-up with nephrology and psychiatry as outpatient.  Patient was removed off ACE inhibitor and chlorthalidone for blood pressure control, she was continued on metoprolol, she will need to repeat her blood pressures with the PCP within 1 week and further reintroduce antihypertensive medications as warranted.  Short course of antibiotics ordered on discharge to complete course.  Patient needs a repeat TSH and free T4 in 4 to 6 weeks with adjustments of levothyroxine performed while in the hospital.      Day of Discharge     Vital Signs:  Temp:  [97.6 °F (36.4 °C)-98.4 °F (36.9 °C)] 98.4 °F (36.9 °C)  Heart Rate:  [70-89] 89  Resp:  [18] 18  BP: (100-138)/(59-75) 121/60  Review of systems obtained, all systems reviewed and negative except for weakness, fatigue    Physical Exam              Constitutional: Awake, alert, no acute distress   Eyes: Pupils equal, sclerae anicteric, no conjunctival injection   HENT: NCAT, mucous membranes moist   Neck: Supple, no thyromegaly, no lymphadenopathy, trachea midline   Respiratory: Clear to auscultation bilaterally, nonlabored respirations    Cardiovascular: RRR, no murmurs, rubs, or gallops, palpable pedal pulses bilaterally   Gastrointestinal: Positive bowel sounds, soft, nontender, nondistended   Musculoskeletal: Trace bilateral ankle edema, positive edema left arm, no clubbing or cyanosis to extremities   Psychiatric: Appropriate affect, cooperative   Neurologic: Oriented x 3, strength symmetric in all  extremities, Cranial Nerves grossly intact to confrontation, speech clear   Skin: No rashes       Discharge Details        Discharge Medications        New Medications        Instructions Start Date   cephalexin 500 MG capsule  Commonly known as: Keflex   500 mg, Oral, 3 Times Daily      mirtazapine 15 MG tablet  Commonly known as: REMERON   15 mg, Oral, Nightly             Changes to Medications        Instructions Start Date   Insulin Aspart FlexPen 100 UNIT/ML solution pen-injector  What changed: See the new instructions.   INJECT 5 UNITS UNDER THE SKIN THREE TIMES DAILY PRIOR TO MEALS, WITH ADDITIONAL SLIDING SCALE AS DIRECTED; MAX UNITS PER DAY IS 60      levothyroxine 100 MCG tablet  Commonly known as: SYNTHROID, LEVOTHROID  What changed:   medication strength  how much to take   100 mcg, Oral, Daily   Start Date: September 30, 2024     Ozempic (1 MG/DOSE) 4 MG/3ML solution pen-injector  Generic drug: Semaglutide (1 MG/DOSE)  What changed:   how to take this  when to take this  additional instructions   INJECT 1MG SUBCUTANEOUS ONCE WEEKLY FOR 9-12      sertraline 100 MG tablet  Commonly known as: ZOLOFT  What changed:   medication strength  how much to take  additional instructions   100 mg, Oral, Daily   Start Date: September 30, 2024            Continue These Medications        Instructions Start Date   albuterol sulfate  (90 Base) MCG/ACT inhaler  Commonly known as: PROVENTIL HFA;VENTOLIN HFA;PROAIR HFA   2 puffs, Inhalation, Every 4 Hours PRN      albuterol 0.63 MG/3ML nebulizer solution  Commonly known as: ACCUNEB   0.63 mg, Nebulization, Every 6 Hours PRN      atorvastatin 10 MG tablet  Commonly known as: LIPITOR   10 mg, Oral, Daily      busPIRone 5 MG tablet  Commonly known as: BUSPAR   5 mg, Oral, 3 Times Daily      Eliquis 5 MG tablet tablet  Generic drug: apixaban   5 mg, Oral, 2 Times Daily      famotidine 20 MG tablet  Commonly known as: Pepcid   20 mg, Oral, 2 Times Daily      gabapentin  300 MG capsule  Commonly known as: NEURONTIN   TAKE 1 CAPSULE BY MOUTH THREE TIMES DAILY      hydrOXYzine 25 MG tablet  Commonly known as: ATARAX   25 mg, Oral, 3 Times Daily PRN, for anxiety      Insulin Glargine 100 UNIT/ML injection pen  Commonly known as: LANTUS SOLOSTAR   40 Units, Subcutaneous, Daily      liothyronine 5 MCG tablet  Commonly known as: CYTOMEL   5 mcg, Oral, 2 Times Daily      metoprolol succinate XL 25 MG 24 hr tablet  Commonly known as: TOPROL-XL   25 mg, Oral, Nightly      pantoprazole 40 MG EC tablet  Commonly known as: PROTONIX   TAKE 1 TABLET BY MOUTH EVERY DAY      rOPINIRole 0.25 MG tablet  Commonly known as: REQUIP   0.25 mg, Oral, Nightly             Stop These Medications      amoxicillin-clavulanate 875-125 MG per tablet  Commonly known as: AUGMENTIN     benazepril 20 MG tablet  Commonly known as: LOTENSIN     chlorthalidone 25 MG tablet  Commonly known as: HYGROTON     DULoxetine 60 MG capsule  Commonly known as: CYMBALTA              No Known Allergies    Discharge Disposition:  Home or Self Care    Diet:  Hospital:  Diet Order   Procedures   • Diet: Diabetic; Consistent Carbohydrate; Fluid Consistency: Thin (IDDSI 0)       Discharge Activity: as tolerates      CODE STATUS:  Code Status and Medical Interventions: CPR (Attempt to Resuscitate); Full Support   Ordered at: 09/27/24 0428     Level Of Support Discussed With:    Patient     Code Status (Patient has no pulse and is not breathing):    CPR (Attempt to Resuscitate)     Medical Interventions (Patient has pulse or is breathing):    Full Support         Future Appointments   Date Time Provider Department Center   9/30/2024  2:30 PM Chda Villarreal MD [facility] SLEEP CT [facility]   10/2/2024 11:15 AM Guy Beatty MD [facility] IMP [facility]WN [facility]       Additional Instructions for the Follow-ups that You Need to Schedule       Discharge Follow-up with PCP   As directed       Currently Documented PCP:    Guy Beatty MD    PCP Phone Number:     [phone]     Follow Up Details: 3 to 7 days                Pertinent  and/or Most Recent Results     PROCEDURES:   XR Chest 1 View    Result Date: 9/28/2024  XR CHEST 1 VW Date of Exam: 9/28/2024 8:37 AM EDT Indication: SOB Comparison: Chest AP dated 8/24/2024 Findings: The lungs are clear bilaterally. The cardiac and mediastinal silhouettes appear normal. No effusion is seen. Postsurgical changes presumably related to a previous thyroidectomy are noted in the lower neck.     Impression: No acute cardiopulmonary disease Electronically Signed: Payam Burrell MD  9/28/2024 9:20 AM EDT  Workstation ID: [id number]    US Renal Bilateral    Result Date: 9/27/2024  US RENAL BILATERAL Date of Exam: 9/27/2024 7:14 AM EDT Indication: SUSANNA. Comparison: No comparisons available. Technique: Grayscale and color Doppler ultrasound evaluation of the kidneys and urinary bladder was performed. Findings: RIGHT kidney measures 10.1 x 6.5 x 6.4 cm.  Kidney echogenicity, size, and vascularity appear within normal limits. There is no solid kidney mass.  No echogenic shadowing stone.  No hydronephrosis. LEFT kidney measures 10.5 x 5.1 x 4.8 cm. Kidney echogenicity, size, and vascularity appear within normal limits. There is no solid kidney mass.  No echogenic shadowing stone.  No hydronephrosis. Limited visualization of the urinary bladder is unremarkable.     Impression: Unremarkable renal ultrasound. Electronically Signed: Razia Ford MD  9/27/2024 7:45 AM EDT  Workstation ID: [id number]    Stress Test With Myocardial Perfusion One Day    Result Date: 9/25/2024  •  Impressions are consistent with a low risk study. •  Left ventricular ejection fraction is hyperdynamic (Calculated EF > 70%). •  There is an small size of mild reversible perfusion defect in the inferior wall suggestive of ischemia. .     Mammo Screening Digital Tomosynthesis Bilateral With CAD    Addendum Date: 9/13/2024    Tissue density is scattered fibroglandular  tissue.  Electronically Signed By-KATHERINE GREEN MD On:9/13/2024 11:25 AM      Result Date: 9/13/2024  MAMMO SCREENING DIGITAL TOMOSYNTHESIS BILATERAL W CAD-  Date of Exam: 9/11/2024 9:30 AM  Indication: screen; Z12.31-Encounter for screening mammogram for malignant neoplasm of breast  Comparison: None. Baseline screening mammogram  Technique:  Screening mammogram was performed utilizing tomosynthesis.   These mammographic images were interpreted with the assistance of a computer aided detection system.  FINDINGS:  No suspicious microcalcifications, dominant masses or areas of architectural distortion in either breast. Visualization of bilateral axillary lymph nodes.       Benign mammogram. Recommend routine breast screening.  TISSUE DENSITY:  BI-RADS ASSESSMENT: BI-RADS 1. Negative.   The patient's information is entered into a computerized reminder system with a targeted due date for the next mammogram.  Note:  It has been reported that there is approximately a 15% false negative in mammography.  Therefore, management of a palpable abnormality should not be deferred because of a negative mammogram.           Electronically Signed By-KATHERINE GREEN MD On:9/11/2024 10:01 AM      MRI Brain With & Without Contrast    Result Date: 9/11/2024  MRI BRAIN W WO CONTRAST Date of Exam: 9/11/2024 8:40 AM EDT Indication: diplopia, tremor, poor balance.  Comparison: None available. Technique:  Routine multiplanar/multisequence sequence images of the brain were obtained before and after the uneventful administration of Multihance. FINDINGS: Several tiny foci of T2/FLAIR signal hyperintensity are seen within the bilateral hemispheric white matter. Midline structures appear unremarkable. No significant mass effect, intracranial hemorrhage, or hydrocephalus is identified. No abnormal contrast enhancement is seen. Diffusion-weighted sequences demonstrate no acute infarct.The visualized intracranial flow-voids appear  unremarkable. The paranasal sinuses and mastoid air cells show no fluid signal. Bilateral lens prostheses are noted. The visualized superficial soft tissues and cervical spine demonstrate no significant abnormality.     1.Several tiny foci of T2/FLAIR signal hyperintensity are seen within the bilateral hemispheric white matter. This finding is nonspecific and may be seen in chronic microvascular ischemic change, postinfectious/postinflammatory states, demyelinating conditions, vasculitis, or migraine headaches. 2.No diffusion restriction is identified to suggest acute infarct. 3.No abnormal contrast enhancement is identified. Electronically Signed: Eddie Boucher MD  9/11/2024 9:25 AM EDT  Workstation ID: [id number]       LAB RESULTS:      Lab 09/29/24  0513 09/28/24  0512 09/27/24  0242   WBC 7.52 7.17 11.84*   HEMOGLOBIN 10.9* 11.3* 14.5   HEMATOCRIT 33.2* 34.3 42.6   PLATELETS 218 197 298   NEUTROS ABS 3.12 3.57 8.73*   IMMATURE GRANS (ABS) 0.05 0.06* 0.18*   LYMPHS ABS 3.30* 2.54 1.92   MONOS ABS 0.60 0.58 0.83   EOS ABS 0.38 0.37 0.11   MCV 92.2 92.7 90.1         Lab 09/29/24  0513 09/28/24  0512 09/27/24  0242   SODIUM 141 141 133*   POTASSIUM 4.1 4.4 4.9   CHLORIDE 108* 109* 94*   CO2 25.3 24.2 21.5*   ANION GAP 7.7 7.8 17.5*   BUN 32* 47* 68*   CREATININE 1.31* 2.16* 4.81*   EGFR 48.2* 26.5* 10.1*   GLUCOSE 119* 109* 349*   CALCIUM 8.6 8.3* 9.4   MAGNESIUM 1.8 2.0 2.2   PHOSPHORUS 2.7 3.4  --    HEMOGLOBIN A1C  --   --  8.80*   TSH  --   --  0.072*         Lab 09/29/24  0513 09/28/24  0512 09/27/24  0242   TOTAL PROTEIN 5.4* 5.3* 7.0   ALBUMIN 2.9* 2.7* 3.6   GLOBULIN  --   --  3.4   ALT (SGPT) 16 17 26   AST (SGOT) 14 14 13   BILIRUBIN 0.2 0.5 0.7   INDIRECT BILIRUBIN  --  0.3  --    BILIRUBIN DIRECT <0.2 0.2  --    ALK PHOS 74 74 105         Lab 09/27/24  0242   TROP T 19*                 Brief Urine Lab Results  (Last result in the past 365 days)        Color   Clarity   Blood   Leuk Est   Nitrite    Protein   CREAT   Urine HCG        09/27/24 0620 Yellow   Cloudy   Trace   Small (1+)   Negative   Trace                 Microbiology Results (last 10 days)       Procedure Component Value - Date/Time    Clostridioides difficile Toxin - Stool, Per Rectum [[id number]] Collected: 09/27/24 1124    Lab Status: Final result Specimen: Stool from Per Rectum Updated: 09/27/24 1238    Narrative:      The following orders were created for panel order Clostridioides difficile Toxin - Stool, Per Rectum.  Procedure                               Abnormality         Status                     ---------                               -----------         ------                     Clostridioides difficile...[[id number]]                      Final result                 Please view results for these tests on the individual orders.    Clostridioides difficile Toxin, PCR - Stool, Per Rectum [[id number]] Collected: 09/27/24 1124    Lab Status: Final result Specimen: Stool from Per Rectum Updated: 09/27/24 1238     Toxigenic C. difficile by PCR Negative     027 Toxin Presumptive Negative    Narrative:      The result indicates the absence of toxigenic C. difficile from stool specimen.     Urine Culture - Urine, Urine, Clean Catch [[id number]]  (Abnormal)  (Susceptibility) Collected: 09/27/24 0620    Lab Status: Final result Specimen: Urine, Clean Catch Updated: 09/29/24 0906     Urine Culture >100,000 CFU/mL Escherichia coli    Narrative:      Colonization of the urinary tract without infection is common. Treatment is discouraged unless the patient is symptomatic, pregnant, or undergoing an invasive urologic procedure.    Susceptibility        Escherichia coli      AHSAN      Amoxicillin + Clavulanate Susceptible      Ampicillin Susceptible      Ampicillin + Sulbactam Susceptible      Cefazolin Susceptible      Cefepime Susceptible      Ceftazidime Susceptible      Ceftriaxone Susceptible      Gentamicin Susceptible      Levofloxacin  Susceptible      Nitrofurantoin Susceptible      Piperacillin + Tazobactam Susceptible      Trimethoprim + Sulfamethoxazole Susceptible                                   XR Chest 1 View    Result Date: 9/28/2024  Impression: Impression: No acute cardiopulmonary disease Electronically Signed: Payam Burrell MD  9/28/2024 9:20 AM EDT  Workstation ID: [id number]    US Renal Bilateral    Result Date: 9/27/2024  Impression: Impression: Unremarkable renal ultrasound. Electronically Signed: Razia Ford MD  9/27/2024 7:45 AM EDT  Workstation ID: [id number]    Mammo Screening Digital Tomosynthesis Bilateral With CAD    Addendum Date: 9/13/2024    Tissue density is scattered fibroglandular tissue.  Electronically Signed By-KATHERINE GREEN MD On:9/13/2024 11:25 AM      Result Date: 9/13/2024  Impression: Benign mammogram. Recommend routine breast screening.  TISSUE DENSITY:  BI-RADS ASSESSMENT: BI-RADS 1. Negative.   The patient's information is entered into a computerized reminder system with a targeted due date for the next mammogram.  Note:  It has been reported that there is approximately a 15% false negative in mammography.  Therefore, management of a palpable abnormality should not be deferred because of a negative mammogram.           Electronically Signed By-KATHERINE GREEN MD On:9/11/2024 10:01 AM      MRI Brain With & Without Contrast    Result Date: 9/11/2024  Impression: 1.Several tiny foci of T2/FLAIR signal hyperintensity are seen within the bilateral hemispheric white matter. This finding is nonspecific and may be seen in chronic microvascular ischemic change, postinfectious/postinflammatory states, demyelinating conditions, vasculitis, or migraine headaches. 2.No diffusion restriction is identified to suggest acute infarct. 3.No abnormal contrast enhancement is identified. Electronically Signed: Eddie Boucher MD  9/11/2024 9:25 AM EDT  Workstation ID: [id number]             Results for orders placed during  the hospital encounter of 08/07/24    Adult Transthoracic Echo Complete W/ Cont if Necessary Per Protocol    Interpretation Summary  •  Left ventricular ejection fraction appears to be 61 - 65%.  •  Left ventricular wall thickness is consistent with mild concentric hypertrophy.  •  Left ventricular diastolic function is consistent with (grade I) impaired relaxation.  •  The left atrial cavity is mildly dilated.      Labs Pending at Discharge:  Pending Labs       Order Current Status    Enteric Bacterial Panel - Stool, Per Rectum In process    Enteric Parasite Panel - Stool, Per Rectum In process              Time spent on Discharge including face to face service:  35 minutes    Electronically signed by Robert Salazar MD, 09/29/24, 10:27 AM EDT.    Portions of this documentation were transcribed electronically from a voice recognition software.  I confirm all data accurately represents the service(s) I performed at today's visit.

## 2024-09-29 NOTE — THERAPY TREATMENT NOTE
"Acute Care - Physical Therapy Treatment Note  [facility]     Patient Name: Carmelina Saldana  : 1970  MRN: [id number]  Today's Date: 2024      Visit Dx:     ICD-10-CM ICD-9-CM   1. Acute renal failure, unspecified acute renal failure type  N17.9 584.9   2. Syncope, unspecified syncope type  R55 780.2   3. Difficulty in walking  R26.2 719.7     Patient Active Problem List   Diagnosis    Uncontrolled type 2 diabetes mellitus    Type 2 diabetes mellitus with diabetic neuropathy, with long-term current use of insulin    Essential hypertension    Postoperative hypothyroidism    Other hyperlipidemia    Class 3 severe obesity without serious comorbidity with body mass index (BMI) of 40.0 to 44.9 in adult    History of pulmonary embolus (PE)    Chronic anticoagulation    Vitreous hemorrhage of right eye    Chronic cough    Visit for screening mammogram    Colon cancer screening    Severe obesity due to excess calories    Vitreous hemorrhage, left eye    Legally blind    Paroxysmal A-fib    Acute renal failure    Syncope    Intractable nausea and vomiting    Diarrhea    Gastroenteritis     Past Medical History:   Diagnosis Date    Abnormal ECG Aug    Acute deep vein thrombosis (DVT) of right popliteal vein     Anemia     Anxiety     Arthritis     Atrial fibrillation     Bilateral pulmonary embolism     Hypertension     Legally blind     Neuropathy     some pains at night in the feet    Obesity     Pleuritic chest pain     Retinopathy     no retinopathy but does have \"blood behind the eyes\"; going for injections and laser treatment; she states her vision has improved some and she will be undergoing laser treatment    Thyroid disease     removed due to thyroid nodules     Type 2 diabetes mellitus      Past Surgical History:   Procedure Laterality Date    CATARACT EXTRACTION, BILATERAL Bilateral     2024 & 2024    CHOLECYSTECTOMY      EYE SURGERY Left 2022    HYSTERECTOMY      PARTIAL    THYROID " SURGERY      US GUIDED FINE NEEDLE ASPIRATION  03/02/2020     PT Assessment (Last 12 Hours)       PT Evaluation and Treatment       Row Name 09/29/24 0905          Physical Therapy Time and Intention    Subjective Information complains of;weakness;fatigue;pain  -DK     Document Type therapy note (daily note)  -DK     Mode of Treatment individual therapy;physical therapy  -DK     Patient Effort good  -DK     Symptoms Noted During/After Treatment fatigue;increased pain  -DK     Comment Pt reports feeling better overall, able to ambulate around the room independently.  -       Row Name 09/29/24 0905          Pain    Pretreatment Pain Rating 5/10  -DK     Posttreatment Pain Rating 5/10  -DK     Pain Location generalized  -DK     Pain Location - back  -DK     Pain Intervention(s) Repositioned;Ambulation/increased activity;Distraction;Therapeutic presence  -       Row Name 09/29/24 0905          Cognition    Affect/Mental Status (Cognition) WNL  -DK     Orientation Status (Cognition) oriented x 4  -DK     Follows Commands (Cognition) WNL  -DK     Cognitive Function WNL  -DK     Personal Safety Interventions gait belt;nonskid shoes/slippers when out of bed;supervised activity  -       Row Name 09/29/24 0905          Bed Mobility    Bed Mobility supine-sit-supine  -DK     Supine-Sit [place] (Bed Mobility) independent  -DK     Sit-Supine [place] (Bed Mobility) independent  -DK     Supine-Sit-Supine [place] (Bed Mobility) independent  -DK     Assistive Device (Bed Mobility) bed rails  -       Row Name 09/29/24 0905          Transfers    Transfers sit-stand transfer;stand-sit transfer  -       Row Name 09/29/24 0905          Sit-Stand Transfer    Sit-Stand [place] (Transfers) standby assist  -     Assistive Device (Sit-Stand Transfers) --  no assistive device  -       Row Name 09/29/24 0905          Stand-Sit Transfer    Stand-Sit [place] (Transfers) standby assist  -     Assistive  Device (Stand-Sit Transfers) --  no assistive device  -       Row Name 09/29/24 0905          Gait/Stairs (Locomotion)    Gait/Stairs Locomotion gait/ambulation independence;gait/ambulation assistive device;distance ambulated;gait pattern  -     [place] (Gait) standby assist  -DK     Assistive Device (Gait) --  no assistive device  -     Distance in Feet (Gait) 50  -     Pattern (Gait) step-through  -     Deviations/Abnormal Patterns (Gait) festinating/shuffling;gait speed decreased;stride length decreased;irais decreased  -     Comment, (Gait/Stairs) Pt ambulated on room air with no assistive device.  She does appear fairly cautious with gait.  No loss of balance noted.  Pt returned to sitting independently EOB post treatment.  -       Row Name 09/29/24 0905          Safety Issues, Functional Mobility    Impairments Affecting Function (Mobility) balance;endurance/activity tolerance;strength;pain  -       Row Name 09/29/24 0905          Balance    Balance Assessment sitting static balance;sitting dynamic balance;standing static balance;standing dynamic balance  -     Static Sitting Balance independent  -     Dynamic Sitting Balance independent  -DK     Position, Sitting Balance unsupported;sitting edge of bed  -     Static Standing Balance standby assist  -     Dynamic Standing Balance standby assist  -DK     Position/Device Used, Standing Balance --  no assistive device  -     Balance Interventions standing;dynamic;tandem gait  -       Row Name 09/29/24 0905          Motor Skills    Motor Skills --  therapeutic exercises  -     Therapeutic Exercise hip;knee;ankle  -       Row Name 09/29/24 0905          Hip (Therapeutic Exercise)    Hip (Therapeutic Exercise) AROM (active range of motion)  -     Hip AROM (Therapeutic Exercise) bilateral;flexion;extension;aBduction;aDduction;sitting;15 repititions  -       Row Name 09/29/24 0905          Knee (Therapeutic Exercise)     Knee (Therapeutic Exercise) AROM (active range of motion)  -DK     Knee AROM (Therapeutic Exercise) bilateral;flexion;extension;LAQ (long arc quad);sitting;15 repititions  -       Row Name 09/29/24 0905          Ankle (Therapeutic Exercise)    Ankle (Therapeutic Exercise) AROM (active range of motion)  -DK     Ankle AROM (Therapeutic Exercise) bilateral;dorsiflexion;plantarflexion;sitting;20 repititions  -       Row Name 09/29/24 0905          Plan of Care Review    Plan of Care Reviewed With patient  -DK     Progress improving  -       Row Name 09/29/24 0905          Positioning and Restraints    Pre-Treatment Position in bed  -DK     Post Treatment Position bed  -DK     In Bed supine;sitting EOB;call light within reach;encouraged to call for assist;side rails up x2  -       Row Name 09/29/24 0905          Therapy Assessment/Plan (PT)    Rehab Potential (PT) good, to achieve stated therapy goals  -DK     Criteria for Skilled Interventions Met (PT) skilled treatment is necessary  -DK     Therapy Frequency (PT) daily  -DK     Problem List (PT) problems related to;balance;mobility;strength;pain  -DK     Activity Limitations Related to Problem List (PT) unable to ambulate safely  -       Row Name 09/29/24 0905          Progress Summary (PT)    Progress Toward Functional Goals (PT) progress toward functional goals is good  -               User Key  (r) = Recorded By, (t) = Taken By, (c) = Cosigned By      Initials Name Provider Type    Bailey Rivas PTA Physical Therapist Assistant                    Physical Therapy Education       Title: PT OT SLP Therapies (Done)       Topic: Physical Therapy (Done)       Point: Mobility training (Done)       Learning Progress Summary             Patient Acceptance, E, VU by TC at 9/27/2024 1416                         Point: Home exercise program (Done)       Learning Progress Summary             Patient Acceptance, E, VU by TC at 9/27/2024 1416                          Point: Body mechanics (Done)       Learning Progress Summary             Patient Acceptance, E, VU by TC at 9/27/2024 1416                         Point: Precautions (Done)       Learning Progress Summary             Patient Acceptance, E, VU by TC at 9/27/2024 1416                                         User Key       Initials Effective Dates Name Provider Type Discipline    TC 06/18/24 -  Rosetta Sarkar, PT Physical Therapist PT                  PT Recommendation and Plan  Planned Therapy Interventions (PT): balance training, bed mobility training, gait training, home exercise program, strengthening, transfer training  Therapy Frequency (PT): daily  Progress Summary (PT)  Progress Toward Functional Goals (PT): progress toward functional goals is good  Plan of Care Reviewed With: patient  Progress: improving   Outcome Measures       Row Name 09/29/24 0905 09/27/24 1400          How much help from another person do you currently need...    Turning from your back to your side while in flat bed without using bedrails? 4  -DK 4  -TC     Moving from lying on back to sitting on the side of a flat bed without bedrails? 4  -DK 4  -TC     Moving to and from a bed to a chair (including a wheelchair)? 3  -DK 3  -TC     Standing up from a chair using your arms (e.g., wheelchair, bedside chair)? 4  -DK 3  -TC     Climbing 3-5 steps with a railing? 3  -DK 3  -TC     To walk in hospital room? 3  -DK 3  -TC     AM-PAC 6 Clicks Score (PT) 21  -DK 20  -TC     Highest Level of Mobility Goal 6 --> Walk 10 steps or more  -DK 6 --> Walk 10 steps or more  -TC        Functional Assessment    Outcome Measure Options AM-PAC 6 Clicks Basic Mobility (PT)  -DK AM-PAC 6 Clicks Basic Mobility (PT)  -TC               User Key  (r) = Recorded By, (t) = Taken By, (c) = Cosigned By      Initials Name Provider Type    Bailey Rivas, PTA Physical Therapist Assistant    TC Rosetta Sarkar, PT Physical Therapist                     Time  Calculation:    PT Charges       Row Name 09/29/24 0910             Time Calculation    PT Received On 09/29/24  -DK      PT Goal Re-Cert Due Date 10/06/24  -DK         Timed Charges    17511 - PT Therapeutic Exercise Minutes 14  -DK      43829 - Gait Training Minutes  6  -DK      89399 - PT Therapeutic Activity Minutes 20  -DK         Total Minutes    Timed Charges Total Minutes 40  -DK       Total Minutes 40  -DK                User Key  (r) = Recorded By, (t) = Taken By, (c) = Cosigned By      Initials Name Provider Type    Bailey Rivas PTA Physical Therapist Assistant                  Therapy Charges for Today       Code Description Service Date Service Provider Modifiers Qty    27445023545 HC PT THER PROC EA 15 MIN 9/29/2024 Bailey Dawson, PTA GP 1    91361348120 HC GAIT TRAINING EA 15 MIN 9/29/2024 Bailey Dawson, PTA GP 1    68706149474 HC PT THERAPEUTIC ACT EA 15 MIN 9/29/2024 Bailey Dawson, PTA GP 1            PT G-Codes  Outcome Measure Options: AM-PAC 6 Clicks Basic Mobility (PT)  AM-PAC 6 Clicks Score (PT): 21    Bailey Dawson PTA  9/29/2024

## 2024-09-29 NOTE — PLAN OF CARE
Goal Outcome Evaluation:  Plan of Care Reviewed With: patient        Progress: improving  Outcome Evaluation: Pt. VSS, remains A & O X 4. IV fluids administered per orders. Pt. on insulin protocol and treated per orders. Hospitalist notified of pt request to restart home ropinirole. Orders placed by MD.

## 2024-09-30 ENCOUNTER — TRANSITIONAL CARE MANAGEMENT TELEPHONE ENCOUNTER (OUTPATIENT)
Dept: CALL CENTER | Facility: HOSPITAL | Age: 54
End: 2024-09-30
Payer: COMMERCIAL

## 2024-09-30 ENCOUNTER — OFFICE VISIT (OUTPATIENT)
Dept: SLEEP MEDICINE | Facility: HOSPITAL | Age: 54
End: 2024-09-30
Payer: COMMERCIAL

## 2024-09-30 VITALS
HEART RATE: 103 BPM | OXYGEN SATURATION: 100 % | SYSTOLIC BLOOD PRESSURE: 129 MMHG | HEIGHT: 64 IN | WEIGHT: 217 LBS | DIASTOLIC BLOOD PRESSURE: 66 MMHG | BODY MASS INDEX: 37.05 KG/M2

## 2024-09-30 DIAGNOSIS — G47.30 OBSERVED SLEEP APNEA: Primary | ICD-10-CM

## 2024-09-30 DIAGNOSIS — E66.812 CLASS 2 OBESITY: ICD-10-CM

## 2024-09-30 DIAGNOSIS — I10 ESSENTIAL HYPERTENSION: ICD-10-CM

## 2024-09-30 DIAGNOSIS — I48.0 PAROXYSMAL A-FIB: ICD-10-CM

## 2024-09-30 DIAGNOSIS — G47.19 EXCESSIVE DAYTIME SLEEPINESS: ICD-10-CM

## 2024-09-30 DIAGNOSIS — R51.9 MORNING HEADACHE: ICD-10-CM

## 2024-09-30 DIAGNOSIS — R06.83 SNORING: ICD-10-CM

## 2024-09-30 PROBLEM — G47.8 NON-RESTORATIVE SLEEP: Status: ACTIVE | Noted: 2024-09-30

## 2024-09-30 PROBLEM — E66.9 CLASS 2 OBESITY: Status: ACTIVE | Noted: 2024-09-30

## 2024-09-30 PROCEDURE — 3074F SYST BP LT 130 MM HG: CPT | Performed by: INTERNAL MEDICINE

## 2024-09-30 PROCEDURE — G0463 HOSPITAL OUTPT CLINIC VISIT: HCPCS

## 2024-09-30 PROCEDURE — 99204 OFFICE O/P NEW MOD 45 MIN: CPT | Performed by: INTERNAL MEDICINE

## 2024-09-30 PROCEDURE — 3078F DIAST BP <80 MM HG: CPT | Performed by: INTERNAL MEDICINE

## 2024-09-30 PROCEDURE — 1159F MED LIST DOCD IN RCRD: CPT | Performed by: INTERNAL MEDICINE

## 2024-09-30 PROCEDURE — 1160F RVW MEDS BY RX/DR IN RCRD: CPT | Performed by: INTERNAL MEDICINE

## 2024-09-30 RX ORDER — CHLORTHALIDONE 25 MG/1
TABLET ORAL
Qty: 90 TABLET | Refills: 2 | OUTPATIENT
Start: 2024-09-30

## 2024-09-30 NOTE — OUTREACH NOTE
Call Center TCM Note      Flowsheet Row Responses   [facility] facility patient discharged from? [facility]   Does the patient have one of the following disease processes/diagnoses(primary or secondary)? Other   TCM attempt successful? No  [VR- parents]   Unsuccessful attempts Attempt 1            Fern Scales RN    9/30/2024, 09:05 EDT

## 2024-09-30 NOTE — PROGRESS NOTES
[facility]  Sleep Medicine   [address] [place] [address]  Phone: [phone]  Fax: [phone]      Carmelina Saldana  [id number]   1970  54 y.o.  female      PCP:Guy Beatty MD    Type of service: Initial New Patient Office Visit  Date of service: 9/30/2024    Chief Complaint   Patient presents with    Witnessed Apnea    Snoring    Non-restorative Sleep    Fatigue    Dry Mouth    Daytime Sleepiness    Obesity       History of present illness;  Carmelina Saldana 54 y.o.  is a new patient for me and was seen today for sleep related problems of snoring, non-restorative sleep and witnessed apneas. The symptoms are present for many years and they are persistent in nature.  The snoring is present in all positions and it is loud.  Patient has no prior surgery namely tonsillectomy, nasal surgery and UPPP.  Also has developed new onset of atrial fibrillation.    Patient gives the following sleep history.  Sleep schedule:  Bedtime: 11 PM  Wake time: 10 AM  Normally takes about 30-60 minutes to fall asleep  Number of naps per day 1  Symptoms  In addition to snoring, nonrestorative sleep and witnessed apneas patient gives the following associated symptoms.  Have you ever awakened gasping for breath, coughing, choking: Yes   Change in weight,  Yes gained 40 pounds  Morning headaches  Yes   Awaken with a sore throat or dry mouth  Yes   Leg jerking at night:  Yes   Crawly feeling/urge sensation to move in the legs: No   Teeth grinding:No   Have you ever awakened at night with a sour taste or burning sensation in your chest:  No   Do you have muscle weakness with laughing or anger or sleep paralysis:  No   Have you ever felt paralyzed while going to sleep or waking up:  No   Sleepwalking, nightmares, No   Nocturia (urination at night): 0 times per night  Memory Problem:No     Past medical history: (Relevant to sleep medicine)  Atrial fibrillation  Diabetes  "mellitus  Hypertension  Anxiety and depression  Restless leg syndrome    Medications are reviewed by me and documented in the encounter  Allergies reviewed and documented in encounter    Social history:  Do you drive a commercial vehicle:  No   Shift work:  No   Tobacco use:  No   Alcohol use:  0 per week  Caffeinated drinks: 2    FAMILY HISTORY (Your mother, father, brothers and sisters) (relevant to sleep medicine)  Sleep apnea, father and sister    REVIEW OF SYSTEMS.  Full review of systems available on the intake form which is scanned in the media tab.  The relevant positive are noted below  Daytime excessive sleepiness with [place] Sleepiness Scale :Total score: 9   Snoring  Headache  Heartburn      Physical exam:  Vitals:    09/30/24 1500   BP: 129/66   BP Location: Left arm   Patient Position: Sitting   Pulse: 103   SpO2: 100%   Weight: 98.4 kg (217 lb)   Height: 162.6 cm (64.02\")    Body mass index is 37.23 kg/m². Neck Circumference: 13 inches  Nose: no nasal septal defects or deviation and the nasal passages are clear, no nasal polyps,  Throat: tonsils are not enlarged, tongue normal, oral airway Mallampati class 3  NECK:Neck Circumference: 13 inches, trachea is in the midline, thyroid not enlarged  RESPIRATORY SYSTEM: Breath sounds are equal on both sides, there are no wheezes   CARDIOVASULAR SYSTEM: Heart sounds are regular rhythm and isabel rate, no edema  EXTREMITES: No cyanosis, clubbing  NEUROLOGICAL SYSTEM: Oriented x 3, no gross motor defects, gait normal    Labs reviewed.  TSH Results:  TSH          3/14/2024    16:00 7/31/2024    14:11 9/27/2024    02:42   TSH   TSH 0.833  0.931  0.072       Most Recent A1C          9/27/2024    02:42   HGBA1C Most Recent   Hemoglobin A1C 8.80         Assessment and plan:  Witnessed apnea (R06.81) patient's symptoms and examination is consistent with sleep apnea (G47.30)  I have talked to the patient about the signs and symptoms of sleep apnea. In addition, I have " also discussed pathophysiology of sleep apnea.  I also discussed the complications of untreated sleep apnea including effects on hypertension, diabetes mellitus and nonrestorative sleep with hypersomnia which can increase risk for motor vehicle accidents.  Untreated sleep apnea is also a risk factor for development of atrial fibrillation, pulmonary hypertension, insulin resistance and stroke.  Discussed in detail of various testing methods including home-based and lab based sleep studies.  Based on history and physical examination the most appropriate study is home sleep test.  The order for the sleep study is placed in [facility].  The test will be scheduled after approval from insurance. Treatment and management will be discussed after the test is completed.  Patient was given opportunity to ask questions and all the questions were answered.   Snoring (R06.83) snoring is the sound created by turbulent airflow vibrating upper airway soft tissue.  I have also discussed factors affecting snoring including sleep deprivation, sleeping on the back and alcohol ingestion. To minimize snoring, patient is advised to have adequate sleep, sleep on the side and avoid alcohol and sedative medications before bedtime  Daytime excessive sleepiness .  It was assessed with [place] Sleepiness Scale of Total score: 9.  There are many causes for daytime excessive sleepiness including sleep depression, shiftwork syndrome, depression and other medical disorders including heart, kidney and liver failure.  The most serious cause of excessive sleepiness is due to neurological conditions like narcolepsy/cataplexy.  But the most common cause of excessive sleepiness is due to sleep apnea with frequent awakenings during sleep time.  I have discussed safety of driving and to remain vigilant while driving.  Obesity 2, with BMI Body mass index is 37.23 kg/m².. I have discussed the relationship between weight and sleep apnea.There is direct correlation  between weight and severity of sleep apnea.  Weight reduction is encouraged, as it is going to reduce the severity of sleep apnea. I have also discussed with the patient diet and exercise to achieve ideal body weight  Hypertension  Diabetes mellitus  New onset of atrial fibrillation  Return for 31 to 90 days after PAP setup with down load..  Patient's questions were answered.      9/30/2024  Chad Villarreal MD  Sleep Medicine  Medical Director  [facility]: [facility] sleep [facility]

## 2024-09-30 NOTE — TELEPHONE ENCOUNTER
Attempted to call patient. Number not working at this time. Sent DocASAP message asking for patient to call to schedule f/u with FERNANDO Pleitez.

## 2024-09-30 NOTE — OUTREACH NOTE
Call Center TCM Note      Flowsheet Row Responses   [facility] patient discharged from? [facility]   Does the patient have one of the following disease processes/diagnoses(primary or secondary)? Other   TCM attempt successful? No   Unsuccessful attempts Attempt 2            Fern Scales RN    9/30/2024, 14:34 EDT

## 2024-10-01 ENCOUNTER — TELEPHONE (OUTPATIENT)
Dept: INTERNAL MEDICINE | Facility: CLINIC | Age: 54
End: 2024-10-01

## 2024-10-01 ENCOUNTER — TRANSITIONAL CARE MANAGEMENT TELEPHONE ENCOUNTER (OUTPATIENT)
Dept: CALL CENTER | Facility: HOSPITAL | Age: 54
End: 2024-10-01
Payer: COMMERCIAL

## 2024-10-01 DIAGNOSIS — F32.A ANXIETY AND DEPRESSION: ICD-10-CM

## 2024-10-01 DIAGNOSIS — F41.9 ANXIETY AND DEPRESSION: ICD-10-CM

## 2024-10-01 RX ORDER — BUSPIRONE HYDROCHLORIDE 5 MG/1
5 TABLET ORAL 3 TIMES DAILY
Qty: 90 TABLET | Refills: 1 | Status: SHIPPED | OUTPATIENT
Start: 2024-10-01

## 2024-10-01 NOTE — OUTREACH NOTE
Call Center TCM Note      Flowsheet Row Responses   [facility] patient discharged from? [facility]   Does the patient have one of the following disease processes/diagnoses(primary or secondary)? Other   TCM attempt successful? Yes  [vr for Ara Salazar and Saw Saldana]   Call start time [phone]   Call end time 0851   Discharge diagnosis Acute renal failure   Meds reviewed with patient/caregiver? Yes   Is the patient having any side effects they believe may be caused by any medication additions or changes? No   Does the patient have all medications ordered at discharge? Yes   Is the patient taking all medications as directed (includes completed medication regime)? Yes   Comments F/U with PCP on 10/2/24@1115am   Does the patient have an appointment with their PCP within 7-14 days of discharge? Yes   Has home health visited the patient within 72 hours of discharge? N/A   Psychosocial comments pt reports she is still having some issues with depression but denies S.I.---has a list of mental health providers and will reach out for scheduling   Did the patient receive a copy of their discharge instructions? Yes   Nursing interventions Reviewed instructions with patient   What is the patient's perception of their health status since discharge? Improving  [Pt reports she is better but still tired, denies ongoing s/s UTI.  Pt is keeping herself hydrated, no issues with diarrhea.  Pt reports latest /72, will bring readings to f/u appt.  Pt  would like to discuss her MRI results at her PCP f/u appt]   Is the patient/caregiver able to teach back signs and symptoms related to disease process for when to call PCP? Yes   Is the patient/caregiver able to teach back signs and symptoms related to disease process for when to call 911? Yes   Additional teach back comments Pt completed sleep lab appt and will have sleep study on 11/2/24.  Made pt aware that cardiology was attempting to contact her to schedule appt. for f/u.   TCM  call completed? Yes   Call end time [phone]            Lilli Degroot, FARHAN    10/1/2024, 08:56 EDT

## 2024-10-01 NOTE — TELEPHONE ENCOUNTER
Caller: Carmelina Saldana    Relationship to patient: Self    Best call back number: [phone]    Chief complaint: PHYSICAL, MED REFILLS     Type of visit: PHYSICAL     Requested date: ANY DAY AFTER 10.2.24    If rescheduling, when is the original appointment: CANCELLED ORIGINAL APPT ON 10.2.24    Additional notes: [facility] AVAILABLE NOT UNTIL JANUARY. PATIENT STATES SHE IS NEEDING TO BE SEEN SOONER DUE TO MED REFILLS.

## 2024-10-01 NOTE — TELEPHONE ENCOUNTER
SW patient. Went over results and recommendation for f/u. Scheduled patient with Dr Cj Crain on 10/03/24. Patient verbalized understanding and appreciation.

## 2024-10-03 ENCOUNTER — OFFICE VISIT (OUTPATIENT)
Dept: CARDIOLOGY | Facility: CLINIC | Age: 54
End: 2024-10-03
Payer: COMMERCIAL

## 2024-10-03 VITALS
SYSTOLIC BLOOD PRESSURE: 133 MMHG | DIASTOLIC BLOOD PRESSURE: 86 MMHG | HEIGHT: 64 IN | WEIGHT: 222 LBS | BODY MASS INDEX: 37.9 KG/M2 | HEART RATE: 101 BPM

## 2024-10-03 DIAGNOSIS — I48.0 PAROXYSMAL A-FIB: Primary | ICD-10-CM

## 2024-10-03 DIAGNOSIS — I10 ESSENTIAL HYPERTENSION: ICD-10-CM

## 2024-10-03 DIAGNOSIS — Z86.711 HISTORY OF PULMONARY EMBOLUS (PE): ICD-10-CM

## 2024-10-03 RX ORDER — METOPROLOL SUCCINATE 50 MG/1
50 TABLET, EXTENDED RELEASE ORAL DAILY
Qty: 90 TABLET | Refills: 3 | Status: SHIPPED | OUTPATIENT
Start: 2024-10-03

## 2024-10-03 NOTE — PROGRESS NOTES
"  CARDIOLOGY FOLLOW-UP PROGRESS NOTE        Chief Complaint  Hospital Follow Up Visit    Subjective            Carmelina Saldana presents to [facility] CARDIOLOGY  History of Present Illness      The patient is cardiac wise stable. She denies chest pain or dyspnea. Tolerating Eliquis , no bleeding. The Nuclear Stress Test showed minimal reversibility at the apical anterior wall which may be ischemia.      Past History:    Medical History:  Past Medical History:   Diagnosis Date    Abnormal ECG Aug    Acid reflux     Acute deep vein thrombosis (DVT) of right popliteal vein     Anemia     Anxiety     Arthritis     Atrial fibrillation     Bilateral pulmonary embolism     Depression     Diabetes     Hypertension     Insomnia     Legally blind     Neuropathy     some pains at night in the feet    Obesity     Observed sleep apnea 9/30/2024    Pleuritic chest pain     Retinopathy     no retinopathy but does have \"blood behind the eyes\"; going for injections and laser treatment; she states her vision has improved some and she will be undergoing laser treatment    RLS (restless legs syndrome)     Thyroid disease     removed due to thyroid nodules     Type 2 diabetes mellitus        Surgical History: has a past surgical history that includes US Guided Fine Needle Aspiration (03/02/2020); Cholecystectomy; Hysterectomy; Thyroid surgery; Eye surgery (Left, 09/28/2022); and Cataract extraction, bilateral (Bilateral).     Family History: family history includes Asthma in her sister; Diabetes type II in her mother and another family member; Obesity in her sister; Sleep apnea in her father, mother, and sister; Stroke in her father.     Social History: reports that she has never smoked. She has never been exposed to tobacco smoke. She has never used smokeless tobacco. She reports that she does not currently use alcohol. She reports that she does not use drugs.    Allergies: Patient has no known " allergies.    Current Outpatient Medications on File Prior to Visit   Medication Sig    albuterol (ACCUNEB) 0.63 MG/3ML nebulizer solution Take 3 mL by nebulization Every 6 (Six) Hours As Needed for Wheezing.    albuterol sulfate  (90 Base) MCG/ACT inhaler Inhale 2 puffs Every 4 (Four) Hours As Needed for Wheezing or Shortness of Air.    atorvastatin (LIPITOR) 10 MG tablet TAKE 1 TABLET BY MOUTH DAILY    busPIRone (BUSPAR) 5 MG tablet TAKE 1 TABLET BY MOUTH THREE TIMES DAILY    cephalexin (Keflex) 500 MG capsule Take 1 capsule by mouth 3 (Three) Times a Day for 5 days.    Eliquis 5 MG tablet tablet TAKE 1 TABLET BY MOUTH TWICE DAILY    famotidine (Pepcid) 20 MG tablet Take 1 tablet by mouth 2 (Two) Times a Day.    gabapentin (NEURONTIN) 300 MG capsule TAKE 1 CAPSULE BY MOUTH THREE TIMES DAILY    hydrOXYzine (ATARAX) 25 MG tablet TAKE 1 TABLET BY MOUTH THREE TIMES DAILY AS NEEDED FOR ANXIETY    Insulin Aspart FlexPen 100 UNIT/ML solution pen-injector INJECT 5 UNITS UNDER THE SKIN THREE TIMES DAILY PRIOR TO MEALS, WITH ADDITIONAL SLIDING SCALE AS DIRECTED; MAX UNITS PER DAY IS 60 (Patient taking differently: Inject 5 Units under the skin into the appropriate area as directed 3 (Three) Times a Day After Meals. INJECT 5 UNITS UNDER THE SKIN THREE TIMES DAILY PRIOR TO MEALS, WITH ADDITIONAL SLIDING SCALE AS DIRECTED; MAX UNITS PER DAY IS 60)    Insulin Glargine (LANTUS SOLOSTAR) 100 UNIT/ML injection pen Inject 40 Units under the skin into the appropriate area as directed Daily.    levothyroxine (SYNTHROID, LEVOTHROID) 100 MCG tablet Take 1 tablet by mouth Daily for 30 days.    liothyronine (CYTOMEL) 5 MCG tablet Take 1 tablet by mouth 2 (Two) Times a Day.    metoprolol succinate XL (TOPROL-XL) 25 MG 24 hr tablet Take 1 tablet by mouth Every Night.    mirtazapine (REMERON) 15 MG tablet Take 1 tablet by mouth Every Night for 30 days.    pantoprazole (PROTONIX) 40 MG EC tablet TAKE 1 TABLET BY MOUTH EVERY DAY     "rOPINIRole (REQUIP) 0.25 MG tablet TAKE 1 TABLET BY MOUTH EVERY NIGHT    Semaglutide, 1 MG/DOSE, (Ozempic, 1 MG/DOSE,) 4 MG/3ML solution pen-injector INJECT 1MG SUBCUTANEOUS ONCE WEEKLY FOR 9-12 (Patient taking differently: Inject  under the skin into the appropriate area as directed 1 (One) Time Per Week. INJECT 1MG SUBCUTANEOUS ONCE WEEKLY FOR 9-12, Mondays)    sertraline (ZOLOFT) 100 MG tablet Take 1 tablet by mouth Daily for 30 days.     No current facility-administered medications on file prior to visit.          Review of Systems : All systems were reviewed and negative    Objective     /86   Pulse 101   Ht 162.6 cm (64\")   Wt 101 kg (222 lb)   BMI 38.11 kg/m²       Physical Exam    General : Alert, awake, no acute distress  Neck : Supple, no carotid bruit, no jugular venous distention  CVS : Regular rate and rhythm, no murmur, rubs or gallops  Lungs: Clear to auscultation bilaterally, no crackles or rhonchi  Abdomen: Soft, nontender, bowel sounds heard in all 4 quadrants  Extremities: Warm, well-perfused, no pedal edema    Result Review :     The following data was reviewed by: Geovanny Crain MD on 10/03/2024:    CMP          9/27/2024    02:42 9/28/2024    05:12 9/29/2024    05:13   CMP   Glucose 349  109  119    BUN 68  47  32    Creatinine 4.81  2.16  1.31    EGFR 10.1  26.5  48.2    Sodium 133  141  141    Potassium 4.9  4.4  4.1    Chloride 94  109  108    Calcium 9.4  8.3  8.6    Total Protein 7.0  5.3  5.4    Albumin 3.6  2.7  2.9    Globulin 3.4      Total Bilirubin 0.7  0.5  0.2    Alkaline Phosphatase 105  74  74    AST (SGOT) 13  14  14    ALT (SGPT) 26  17  16    Albumin/Globulin Ratio 1.1      BUN/Creatinine Ratio 14.1  21.8  24.4    Anion Gap 17.5  7.8  7.7      CBC          9/27/2024    02:42 9/28/2024    05:12 9/29/2024    05:13   CBC   WBC 11.84  7.17  7.52    RBC 4.73  3.70  3.60    Hemoglobin 14.5  11.3  10.9    Hematocrit 42.6  34.3  33.2    MCV 90.1  92.7  92.2    MCH 30.7  " 30.5  30.3    MCHC 34.0  32.9  32.8    RDW 13.5  13.6  13.6    Platelets 298  197  218      TSH          3/14/2024    16:00 7/31/2024    14:11 9/27/2024    02:42   TSH   TSH 0.833  0.931  0.072      Lipid Panel          7/31/2024    14:11   Lipid Panel   Total Cholesterol 148    Triglycerides 111    HDL Cholesterol 57    VLDL Cholesterol 20    LDL Cholesterol  71    LDL/HDL Ratio 1.21           Data reviewed: Cardiology studies        Results for orders placed during the hospital encounter of 08/07/24    Adult Transthoracic Echo Complete W/ Cont if Necessary Per Protocol    Interpretation Summary    Left ventricular ejection fraction appears to be 61 - 65%.    Left ventricular wall thickness is consistent with mild concentric hypertrophy.    Left ventricular diastolic function is consistent with (grade I) impaired relaxation.    The left atrial cavity is mildly dilated.      Results for orders placed during the hospital encounter of 09/25/24    Stress Test With Myocardial Perfusion One Day    Interpretation Summary    Impressions are consistent with a low risk study.    Left ventricular ejection fraction is hyperdynamic (Calculated EF > 70%).    There is an small size of mild reversible perfusion defect in the inferior wall suggestive of ischemia. .               Assessment and Plan        Diagnoses and all orders for this visit:    1. Paroxysmal A-fib (Primary)    2. Essential hypertension    3. History of pulmonary embolus (PE)    Other orders  -     metoprolol succinate XL (TOPROL-XL) 50 MG 24 hr tablet; Take 1 tablet by mouth Daily.  Dispense: 90 tablet; Refill: 3          I would increase the Metoprolol XL to 50 mg po daily. Continue with Eliquis 5 mg po bid for prevention of stroke/TIA. Continue with lifestyle modification and heart healthy diet. Advised to increase in physical activities with walking 45 minutes per day, 5 times per day.     Follow Up     Return in about 6 months (around 4/3/2025).    Patient  was given instructions and counseling regarding her condition or for health maintenance advice. Please see specific information pulled into the AVS if appropriate.

## 2024-10-09 ENCOUNTER — READMISSION MANAGEMENT (OUTPATIENT)
Dept: CALL CENTER | Facility: HOSPITAL | Age: 54
End: 2024-10-09
Payer: COMMERCIAL

## 2024-10-09 NOTE — OUTREACH NOTE
Medical Week 2 Survey      Flowsheet Row Responses   [facility], [place] facility patient discharged from? [facility]   Does the patient have one of the following disease processes/diagnoses(primary or secondary)? Other   Week 2 attempt successful? No   Unsuccessful attempts Attempt 1            Dai DANIELLE - Registered Nurse

## 2024-10-10 RX ORDER — APIXABAN 5 MG/1
5 TABLET, FILM COATED ORAL 2 TIMES DAILY
Qty: 60 TABLET | Refills: 2 | Status: SHIPPED | OUTPATIENT
Start: 2024-10-10

## 2024-10-14 ENCOUNTER — TELEPHONE (OUTPATIENT)
Dept: INTERNAL MEDICINE | Facility: CLINIC | Age: 54
End: 2024-10-14

## 2024-10-14 NOTE — TELEPHONE ENCOUNTER
Caller: [facility]    Relationship to patient: Other    Best call back number: [phone]     Patient is needing: CALLER REACHING OUT TO ADVISE OF GAP IN PATIENT CARE. CALLER STATES PATIENT IS OVER DUE FOR CERVICAL CANCER SCREENING.

## 2024-10-18 DIAGNOSIS — E03.8 OTHER SPECIFIED HYPOTHYROIDISM: ICD-10-CM

## 2024-10-18 RX ORDER — LIOTHYRONINE SODIUM 5 UG/1
5 TABLET ORAL 2 TIMES DAILY
Qty: 180 TABLET | Refills: 2 | Status: SHIPPED | OUTPATIENT
Start: 2024-10-18

## 2024-11-08 DIAGNOSIS — E78.49 OTHER HYPERLIPIDEMIA: ICD-10-CM

## 2024-11-08 DIAGNOSIS — F41.9 ANXIETY AND DEPRESSION: ICD-10-CM

## 2024-11-08 DIAGNOSIS — F32.A ANXIETY AND DEPRESSION: ICD-10-CM

## 2024-11-11 RX ORDER — PANTOPRAZOLE SODIUM 40 MG/1
TABLET, DELAYED RELEASE ORAL
Qty: 90 TABLET | Refills: 2 | Status: SHIPPED | OUTPATIENT
Start: 2024-11-11

## 2024-11-11 RX ORDER — SERTRALINE HYDROCHLORIDE 25 MG/1
75 TABLET, FILM COATED ORAL DAILY
Qty: 270 TABLET | Refills: 2 | Status: SHIPPED | OUTPATIENT
Start: 2024-11-11

## 2024-11-11 RX ORDER — HYDROXYZINE HYDROCHLORIDE 25 MG/1
25 TABLET, FILM COATED ORAL 3 TIMES DAILY PRN
Qty: 90 TABLET | Refills: 0 | Status: SHIPPED | OUTPATIENT
Start: 2024-11-11

## 2024-11-11 RX ORDER — ATORVASTATIN CALCIUM 10 MG/1
10 TABLET, FILM COATED ORAL DAILY
Qty: 90 TABLET | Refills: 0 | Status: SHIPPED | OUTPATIENT
Start: 2024-11-11

## 2025-01-13 ENCOUNTER — OFFICE VISIT (OUTPATIENT)
Dept: INTERNAL MEDICINE | Facility: CLINIC | Age: 55
End: 2025-01-13
Payer: COMMERCIAL

## 2025-01-13 VITALS
BODY MASS INDEX: 38.58 KG/M2 | OXYGEN SATURATION: 95 % | WEIGHT: 226 LBS | HEART RATE: 79 BPM | TEMPERATURE: 97.7 F | DIASTOLIC BLOOD PRESSURE: 80 MMHG | HEIGHT: 64 IN | SYSTOLIC BLOOD PRESSURE: 115 MMHG

## 2025-01-13 DIAGNOSIS — I44.1 AV BLOCK, 2ND DEGREE: ICD-10-CM

## 2025-01-13 DIAGNOSIS — I48.0 PAROXYSMAL A-FIB: ICD-10-CM

## 2025-01-13 DIAGNOSIS — F41.9 ANXIETY AND DEPRESSION: ICD-10-CM

## 2025-01-13 DIAGNOSIS — Z23 ENCOUNTER FOR IMMUNIZATION: ICD-10-CM

## 2025-01-13 DIAGNOSIS — R42 VERTIGO: ICD-10-CM

## 2025-01-13 DIAGNOSIS — E78.49 OTHER HYPERLIPIDEMIA: ICD-10-CM

## 2025-01-13 DIAGNOSIS — E03.8 OTHER SPECIFIED HYPOTHYROIDISM: ICD-10-CM

## 2025-01-13 DIAGNOSIS — E11.40 TYPE 2 DIABETES MELLITUS WITH DIABETIC NEUROPATHY, WITH LONG-TERM CURRENT USE OF INSULIN: ICD-10-CM

## 2025-01-13 DIAGNOSIS — E66.01 MORBID (SEVERE) OBESITY DUE TO EXCESS CALORIES: ICD-10-CM

## 2025-01-13 DIAGNOSIS — F32.A ANXIETY AND DEPRESSION: ICD-10-CM

## 2025-01-13 DIAGNOSIS — Z79.4 TYPE 2 DIABETES MELLITUS WITH DIABETIC NEUROPATHY, WITH LONG-TERM CURRENT USE OF INSULIN: ICD-10-CM

## 2025-01-13 DIAGNOSIS — Z00.00 ANNUAL PHYSICAL EXAM: Primary | ICD-10-CM

## 2025-01-13 DIAGNOSIS — I10 ESSENTIAL HYPERTENSION: ICD-10-CM

## 2025-01-13 DIAGNOSIS — Z79.01 CHRONIC ANTICOAGULATION: ICD-10-CM

## 2025-01-13 DIAGNOSIS — Z79.899 MEDICATION MANAGEMENT: ICD-10-CM

## 2025-01-13 LAB
ALBUMIN SERPL-MCNC: 3.9 G/DL (ref 3.5–5.2)
ALBUMIN/GLOB SERPL: 1.5 G/DL
ALP SERPL-CCNC: 107 U/L (ref 39–117)
ALT SERPL W P-5'-P-CCNC: 28 U/L (ref 1–33)
AMPHET+METHAMPHET UR QL: NEGATIVE
AMPHETAMINE INTERNAL CONTROL: NORMAL
AMPHETAMINES UR QL: NEGATIVE
ANION GAP SERPL CALCULATED.3IONS-SCNC: 10.2 MMOL/L (ref 5–15)
AST SERPL-CCNC: 21 U/L (ref 1–32)
BARBITURATE INTERNAL CONTROL: NORMAL
BARBITURATES UR QL SCN: NEGATIVE
BASOPHILS # BLD AUTO: 0.04 10*3/MM3 (ref 0–0.2)
BASOPHILS NFR BLD AUTO: 0.6 % (ref 0–1.5)
BENZODIAZ UR QL SCN: NEGATIVE
BENZODIAZEPINE INTERNAL CONTROL: NORMAL
BILIRUB SERPL-MCNC: 0.5 MG/DL (ref 0–1.2)
BUN SERPL-MCNC: 18 MG/DL (ref 6–20)
BUN/CREAT SERPL: 11.2 (ref 7–25)
BUPRENORPHINE INTERNAL CONTROL: NORMAL
BUPRENORPHINE SERPL-MCNC: NEGATIVE NG/ML
CALCIUM SPEC-SCNC: 9.1 MG/DL (ref 8.6–10.5)
CANNABINOIDS SERPL QL: NEGATIVE
CHLORIDE SERPL-SCNC: 97 MMOL/L (ref 98–107)
CHOLEST SERPL-MCNC: 248 MG/DL (ref 0–200)
CO2 SERPL-SCNC: 26.8 MMOL/L (ref 22–29)
COCAINE INTERNAL CONTROL: NORMAL
COCAINE UR QL: NEGATIVE
CREAT SERPL-MCNC: 1.61 MG/DL (ref 0.57–1)
DEPRECATED RDW RBC AUTO: 46.7 FL (ref 37–54)
EGFRCR SERPLBLD CKD-EPI 2021: 37.6 ML/MIN/1.73
EOSINOPHIL # BLD AUTO: 0.21 10*3/MM3 (ref 0–0.4)
EOSINOPHIL NFR BLD AUTO: 3 % (ref 0.3–6.2)
ERYTHROCYTE [DISTWIDTH] IN BLOOD BY AUTOMATED COUNT: 13.5 % (ref 12.3–15.4)
EXPIRATION DATE: NORMAL
GLOBULIN UR ELPH-MCNC: 2.6 GM/DL
GLUCOSE SERPL-MCNC: 325 MG/DL (ref 65–99)
HBA1C MFR BLD: 10.7 % (ref 4.8–5.6)
HCT VFR BLD AUTO: 44.4 % (ref 34–46.6)
HDLC SERPL-MCNC: 69 MG/DL (ref 40–60)
HGB BLD-MCNC: 14.6 G/DL (ref 12–15.9)
IMM GRANULOCYTES # BLD AUTO: 0.04 10*3/MM3 (ref 0–0.05)
IMM GRANULOCYTES NFR BLD AUTO: 0.6 % (ref 0–0.5)
LDLC SERPL CALC-MCNC: 156 MG/DL (ref 0–100)
LDLC/HDLC SERPL: 2.22 {RATIO}
LYMPHOCYTES # BLD AUTO: 1.87 10*3/MM3 (ref 0.7–3.1)
LYMPHOCYTES NFR BLD AUTO: 26.7 % (ref 19.6–45.3)
Lab: NORMAL
MCH RBC QN AUTO: 31.3 PG (ref 26.6–33)
MCHC RBC AUTO-ENTMCNC: 32.9 G/DL (ref 31.5–35.7)
MCV RBC AUTO: 95.1 FL (ref 79–97)
MDMA (ECSTASY) INTERNAL CONTROL: NORMAL
MDMA UR QL SCN: NEGATIVE
METHADONE INTERNAL CONTROL: NORMAL
METHADONE UR QL SCN: NEGATIVE
METHAMPHETAMINE INTERNAL CONTROL: NORMAL
MONOCYTES # BLD AUTO: 0.37 10*3/MM3 (ref 0.1–0.9)
MONOCYTES NFR BLD AUTO: 5.3 % (ref 5–12)
MORPHINE INTERNAL CONTROL: NORMAL
MORPHINE/OPIATES SCREEN, URINE: NEGATIVE
NEUTROPHILS NFR BLD AUTO: 4.47 10*3/MM3 (ref 1.7–7)
NEUTROPHILS NFR BLD AUTO: 63.8 % (ref 42.7–76)
NRBC BLD AUTO-RTO: 0 /100 WBC (ref 0–0.2)
OXYCODONE INTERNAL CONTROL: NORMAL
OXYCODONE UR QL SCN: NEGATIVE
PCP UR QL SCN: NEGATIVE
PHENCYCLIDINE INTERNAL CONTROL: NORMAL
PLATELET # BLD AUTO: 250 10*3/MM3 (ref 140–450)
PMV BLD AUTO: 10.5 FL (ref 6–12)
POTASSIUM SERPL-SCNC: 4.6 MMOL/L (ref 3.5–5.2)
PROT SERPL-MCNC: 6.5 G/DL (ref 6–8.5)
RBC # BLD AUTO: 4.67 10*6/MM3 (ref 3.77–5.28)
SODIUM SERPL-SCNC: 134 MMOL/L (ref 136–145)
THC INTERNAL CONTROL: NORMAL
TRIGL SERPL-MCNC: 130 MG/DL (ref 0–150)
TSH SERPL DL<=0.05 MIU/L-ACNC: 125 UIU/ML (ref 0.27–4.2)
VLDLC SERPL-MCNC: 23 MG/DL (ref 5–40)
WBC NRBC COR # BLD AUTO: 7 10*3/MM3 (ref 3.4–10.8)

## 2025-01-13 PROCEDURE — 3074F SYST BP LT 130 MM HG: CPT | Performed by: STUDENT IN AN ORGANIZED HEALTH CARE EDUCATION/TRAINING PROGRAM

## 2025-01-13 PROCEDURE — 90480 ADMN SARSCOV2 VAC 1/ONLY CMP: CPT | Performed by: STUDENT IN AN ORGANIZED HEALTH CARE EDUCATION/TRAINING PROGRAM

## 2025-01-13 PROCEDURE — 3079F DIAST BP 80-89 MM HG: CPT | Performed by: STUDENT IN AN ORGANIZED HEALTH CARE EDUCATION/TRAINING PROGRAM

## 2025-01-13 PROCEDURE — 90471 IMMUNIZATION ADMIN: CPT | Performed by: STUDENT IN AN ORGANIZED HEALTH CARE EDUCATION/TRAINING PROGRAM

## 2025-01-13 PROCEDURE — 99396 PREV VISIT EST AGE 40-64: CPT | Performed by: STUDENT IN AN ORGANIZED HEALTH CARE EDUCATION/TRAINING PROGRAM

## 2025-01-13 PROCEDURE — 80061 LIPID PANEL: CPT | Performed by: STUDENT IN AN ORGANIZED HEALTH CARE EDUCATION/TRAINING PROGRAM

## 2025-01-13 PROCEDURE — 80050 GENERAL HEALTH PANEL: CPT | Performed by: STUDENT IN AN ORGANIZED HEALTH CARE EDUCATION/TRAINING PROGRAM

## 2025-01-13 PROCEDURE — 90656 IIV3 VACC NO PRSV 0.5 ML IM: CPT | Performed by: STUDENT IN AN ORGANIZED HEALTH CARE EDUCATION/TRAINING PROGRAM

## 2025-01-13 PROCEDURE — 91320 SARSCV2 VAC 30MCG TRS-SUC IM: CPT | Performed by: STUDENT IN AN ORGANIZED HEALTH CARE EDUCATION/TRAINING PROGRAM

## 2025-01-13 PROCEDURE — 1125F AMNT PAIN NOTED PAIN PRSNT: CPT | Performed by: STUDENT IN AN ORGANIZED HEALTH CARE EDUCATION/TRAINING PROGRAM

## 2025-01-13 PROCEDURE — 83036 HEMOGLOBIN GLYCOSYLATED A1C: CPT | Performed by: STUDENT IN AN ORGANIZED HEALTH CARE EDUCATION/TRAINING PROGRAM

## 2025-01-13 RX ORDER — GABAPENTIN 300 MG/1
CAPSULE ORAL
Qty: 90 CAPSULE | OUTPATIENT
Start: 2025-01-13

## 2025-01-13 RX ORDER — GABAPENTIN 300 MG/1
300 CAPSULE ORAL 3 TIMES DAILY
Qty: 90 CAPSULE | Refills: 2 | Status: SHIPPED | OUTPATIENT
Start: 2025-01-13

## 2025-01-13 NOTE — PROGRESS NOTES
Chief Complaint  Annual Exam, Dizziness (Dizzy spells for a couple of weeks), and Med Management (Pt requested higher dosage for restless leg med, and new Rx for gabapentin )    Subjective          Carmelina Saldana presents to [facility]  History of Present Illness    Remarried in December.  Here with spouse Chris.    Off gabapentin for several months.  It was helpful for neuropathic foot pain, and would like to re-start.    Would like to increase requip for restless leg.  As I am leaving this practice in two weeks, she understands that I am not comfortable with adjusting the dosing without the ability to follow up.    Last two weeks,  having vertigo episodes when lying down or standing up.    Had Eye injections in December.  Following up the 28th of this morning.    Doing well in terms of mood.  Denies SI    PHQ-9 Depression Screening  Little interest or pleasure in doing things?     Feeling down, depressed, or hopeless?     PHQ-2 Total Score     Trouble falling or staying asleep, or sleeping too much?     Feeling tired or having little energy?     Poor appetite or overeating?     Feeling bad about yourself - or that you are a failure or have let yourself or your family down?     Trouble concentrating on things, such as reading the newspaper or watching television?     Moving or speaking so slowly that other people could have noticed? Or the opposite - being so fidgety or restless that you have been moving around a lot more than usual?     Thoughts that you would be better off dead, or of hurting yourself in some way?     PHQ-9 Total Score     If you checked off any problems, how difficult have these problems made it for you to do your work, take care of things at home, or get along with other people?         Current Outpatient Medications   Medication Instructions    albuterol (ACCUNEB) 0.63 mg, Nebulization, Every 6 Hours PRN    albuterol sulfate  (90  "Base) MCG/ACT inhaler 2 puffs, Inhalation, Every 4 Hours PRN    atorvastatin (LIPITOR) 10 mg, Oral, Daily    benzonatate (TESSALON) 200 mg, Oral, 3 Times Daily PRN    busPIRone (BUSPAR) 5 mg, Oral, 3 Times Daily    Eliquis 5 mg, Oral, 2 Times Daily    famotidine (PEPCID) 20 mg, Oral, 2 Times Daily    gabapentin (NEURONTIN) 300 mg, Oral, 3 Times Daily    hydrOXYzine (ATARAX) 25 mg, Oral, 3 Times Daily PRN, for anxiety    Insulin Aspart FlexPen 100 UNIT/ML solution pen-injector INJECT 5 UNITS UNDER THE SKIN THREE TIMES DAILY PRIOR TO MEALS, WITH ADDITIONAL SLIDING SCALE AS DIRECTED; MAX UNITS PER DAY IS 60    Insulin Glargine (LANTUS SOLOSTAR) 40 Units, Subcutaneous, Daily    levothyroxine (SYNTHROID, LEVOTHROID) 100 mcg, Oral, Daily    liothyronine (CYTOMEL) 5 mcg, Oral, 2 Times Daily    metoprolol succinate XL (TOPROL-XL) 50 mg, Oral, Daily    mirtazapine (REMERON) 15 mg, Oral, Nightly    pantoprazole (PROTONIX) 40 MG EC tablet TAKE 1 TABLET BY MOUTH EVERY DAY    rOPINIRole (REQUIP) 0.25 mg, Oral, Nightly    Semaglutide, 1 MG/DOSE, (Ozempic, 1 MG/DOSE,) 4 MG/3ML solution pen-injector INJECT 1MG SUBCUTANEOUS ONCE WEEKLY FOR 9-12    sertraline (ZOLOFT) 100 mg, Oral, Daily    sertraline (ZOLOFT) 75 mg, Oral, Daily       The following portions of the patient's history were reviewed and updated as appropriate: allergies, current medications, past family history, past medical history, past social history, past surgical history, and problem list.    Objective   Vital Signs:   /80   Pulse 79   Temp 97.7 °F (36.5 °C)   Ht 162.6 cm (64\")   Wt 103 kg (226 lb)   SpO2 95%   BMI 38.79 kg/m²     BP Readings from Last 3 Encounters:   01/13/25 115/80   10/16/24 150/94   10/03/24 133/86     Wt Readings from Last 3 Encounters:   01/13/25 103 kg (226 lb)   10/16/24 101 kg (222 lb 10.6 oz)   10/03/24 101 kg (222 lb)           Physical Exam  Vitals reviewed.   Constitutional:       General: She is not in acute distress.     " Appearance: Normal appearance. She is not ill-appearing, toxic-appearing or diaphoretic.   HENT:      Head: Normocephalic and atraumatic.      Right Ear: Tympanic membrane, ear canal and external ear normal.      Left Ear: Tympanic membrane, ear canal and external ear normal.   Eyes:      Conjunctiva/sclera: Conjunctivae normal.   Cardiovascular:      Rate and Rhythm: Normal rate and regular rhythm.      Pulses: Normal pulses.      Heart sounds: Normal heart sounds. No murmur heard.     No friction rub. No gallop.   Pulmonary:      Effort: Pulmonary effort is normal. No respiratory distress.      Breath sounds: Normal breath sounds. No stridor. No wheezing, rhonchi or rales.   Chest:      Chest wall: No tenderness.   Abdominal:      General: Abdomen is flat.      Palpations: Abdomen is soft. There is no mass.      Tenderness: There is no abdominal tenderness.   Musculoskeletal:      Right lower leg: No edema.      Left lower leg: No edema.   Skin:     General: Skin is warm and dry.   Neurological:      General: No focal deficit present.      Mental Status: She is alert. Mental status is at baseline.   Psychiatric:         Behavior: Behavior normal.         Thought Content: Thought content normal.         Judgment: Judgment normal.        Result Review :   The following data was reviewed by: Guy Beatty MD on 01/13/2025:  Common labs          9/27/2024    02:42 9/28/2024    05:12 9/29/2024    05:13   Common Labs   Glucose 349  109  119    BUN 68  47  32    Creatinine 4.81  2.16  1.31    Sodium 133  141  141    Potassium 4.9  4.4  4.1    Chloride 94  109  108    Calcium 9.4  8.3  8.6    Albumin 3.6  2.7  2.9    Total Bilirubin 0.7  0.5  0.2    Alkaline Phosphatase 105  74  74    AST (SGOT) 13  14  14    ALT (SGPT) 26  17  16    WBC 11.84  7.17  7.52    Hemoglobin 14.5  11.3  10.9    Hematocrit 42.6  34.3  33.2    Platelets 298  197  218    Hemoglobin A1C 8.80               Lab Results   Component Value Date     SARSANTIGEN Not Detected 10/16/2024    COVID19 Not Detected 07/13/2024    RAPFLUA Negative 10/16/2024    RAPFLUB Negative 10/16/2024    FLUAAG Not Detected 06/09/2024    FLUBAG Not Detected 06/09/2024    RAPSCRN Negative 10/16/2024    RSV Not Detected 07/13/2024    INR 0.92 (L) 11/23/2020    BILIRUBINUR Negative 09/27/2024            Assessment and Plan    Diagnoses and all orders for this visit:    1. Annual physical exam (Primary)  -     CBC & Differential  -     Comprehensive Metabolic Panel  -     Hemoglobin A1c  -     Lipid Panel  -     TSH    2. Essential hypertension  -     Comprehensive Metabolic Panel    3. Anxiety and depression    4. Type 2 diabetes mellitus with diabetic neuropathy, with long-term current use of insulin  -     Comprehensive Metabolic Panel  -     Hemoglobin A1c  -     gabapentin (NEURONTIN) 300 MG capsule; Take 1 capsule by mouth 3 (Three) Times a Day.  Dispense: 90 capsule; Refill: 2    5. Other hyperlipidemia  -     Lipid Panel    6. AV block, 2nd degree    7. Paroxysmal A-fib    8. Severe obesity due to excess calories    9. Chronic anticoagulation    10. Other specified hypothyroidism  -     TSH    11. Medication management  -     POC Medline 12 Panel Urine Drug Screen    12. Vertigo  -     Ambulatory Referral to Physical Therapy for Evaluation & Treatment    13. Encounter for immunization  -     COVID-19 (Pfizer) 12yrs+ (COMIRNATY)  -     Fluzone >6mos      HTN:  -stable      Health Maintenance:  -nutritional counseling on the components of a heart healthy diet  -exercise counseling, recommending at least 2.5 hours of moderate exercise weekly  -Discussed risks/benefits to vaccination, reviewed components of the vaccine, discussed VIS, discussed informed consent, informed consent obtained. Patient/Parent was allowed to accept or refuse vaccine. Questions answered to satisfactory state of patient/parent. We reviewed typical age appropriate and seasonally appropriate vaccinations.  Reviewed immunization history and updated state vaccination form as needed. Patient/Parent was counseled on the above vaccines.      Medications Discontinued During This Encounter   Medication Reason    gabapentin (NEURONTIN) 300 MG capsule Reorder    metoprolol succinate XL (TOPROL-XL) 25 MG 24 hr tablet           Follow Up   Return in about 3 months (around 4/13/2025) for Type 2 Diabetes, Neuropathy.  Patient was given instructions and counseling regarding her condition or for health maintenance advice. Please see specific information pulled into the AVS if appropriate.       Guy Beatty MD  01/13/25  14:45 EST

## 2025-01-14 RX ORDER — LEVOTHYROXINE SODIUM 100 UG/1
100 TABLET ORAL DAILY
Qty: 90 TABLET | Refills: 2 | Status: SHIPPED | OUTPATIENT
Start: 2025-01-14 | End: 2025-02-13

## 2025-02-13 RX ORDER — BENAZEPRIL HYDROCHLORIDE 20 MG/1
20 TABLET ORAL DAILY
Qty: 90 TABLET | Refills: 1 | OUTPATIENT
Start: 2025-02-13

## 2025-02-13 NOTE — TELEPHONE ENCOUNTER
Reached out to pt via my chart to verify that benazepril has been dc'ed. Waiting for a response. Her phone number is no good.

## 2025-03-11 ENCOUNTER — TELEPHONE (OUTPATIENT)
Dept: CARDIOLOGY | Facility: CLINIC | Age: 55
End: 2025-03-11
Payer: COMMERCIAL

## 2025-03-11 NOTE — TELEPHONE ENCOUNTER
Caller: Carmelina Saldana     Relationship: PT    Best call back number: [phone]    What is your medical concern? FOR THE PAST TWO WEEKS, THE PT HAS BEEN HAVING PALPITATIONS 3-4 TIMES A DAY. SHE HAS ALSO BEEN DIZZY AND LIGHTHEADED. THE LAST TIME THE PT FELT THESE SYMPTOMS WAS TODAY 3.11.25 AROUND 10 AM.     Is your provider already aware of this issue? YES    Have you been treated for this issue? YES     SENDING AS HIGH PRIOR DUE TO SYMPTOMS BEING ONGOING

## 2025-03-11 NOTE — TELEPHONE ENCOUNTER
Please check and see if she has checked her heart rate and blood pressure?  As long as her blood pressure is stable, we can increase dose of metoprolol, it looks that she is currently taking 50 mg once daily, recommend taking 20 5 in the morning and 50 mg at night.  She did not have it improvement in symptoms after making medication dosage change, then we can move her follow-up appointment forward.

## 2025-03-12 NOTE — TELEPHONE ENCOUNTER
CAMACHO patient. Patient has not been keeping bp at this time. Recommended patient keep record of her BP/HR for the next few days twice daily and at time of dizzy event, patient verbalized understanding and [facility]     Will send to provider to assess and possible make medication changes at that time.

## 2025-04-01 ENCOUNTER — TELEPHONE (OUTPATIENT)
Dept: GASTROENTEROLOGY | Facility: CLINIC | Age: 55
End: 2025-04-01
Payer: COMMERCIAL

## 2025-05-15 ENCOUNTER — TELEPHONE (OUTPATIENT)
Dept: INTERNAL MEDICINE | Facility: CLINIC | Age: 55
End: 2025-05-15

## 2025-05-15 NOTE — TELEPHONE ENCOUNTER
Pharmacy Name: [id number] - [place], [place] - [address] AT [facility] - [phone] [facility] [phone]      Pharmacy representative name: KIM    Pharmacy representative phone number: [phone]    What medication are you calling in regards to: GLUCOSE METER     What question does the pharmacy have: INSURANCE NO LONGER COVERS THE METER PATIENT HAS CURRENTLY. THEY ARE ASKING FOR A NEW ONE FOR PATIENT TO USE SO SHE CAN START TESTING SUGAR AGAIN.

## 2025-08-26 ENCOUNTER — TRANSCRIBE ORDERS (OUTPATIENT)
Dept: ADMINISTRATIVE | Facility: HOSPITAL | Age: 55
End: 2025-08-26
Payer: COMMERCIAL

## 2025-08-26 DIAGNOSIS — Z78.0 ASYMPTOMATIC MENOPAUSAL STATE: Primary | ICD-10-CM
